# Patient Record
Sex: FEMALE | URBAN - METROPOLITAN AREA
[De-identification: names, ages, dates, MRNs, and addresses within clinical notes are randomized per-mention and may not be internally consistent; named-entity substitution may affect disease eponyms.]

---

## 2017-08-14 ENCOUNTER — INPATIENT (INPATIENT)
Facility: HOSPITAL | Age: 72
LOS: 1 days | Discharge: HOME | DRG: 194 | End: 2017-08-16
Attending: FAMILY MEDICINE | Admitting: INTERNAL MEDICINE
Payer: MEDICARE

## 2017-08-14 VITALS
SYSTOLIC BLOOD PRESSURE: 121 MMHG | HEIGHT: 67 IN | TEMPERATURE: 97 F | OXYGEN SATURATION: 94 % | HEART RATE: 67 BPM | RESPIRATION RATE: 15 BRPM | WEIGHT: 160.06 LBS | DIASTOLIC BLOOD PRESSURE: 55 MMHG

## 2017-08-14 DIAGNOSIS — R53.1 WEAKNESS: ICD-10-CM

## 2017-08-14 DIAGNOSIS — F32.9 MAJOR DEPRESSIVE DISORDER, SINGLE EPISODE, UNSPECIFIED: ICD-10-CM

## 2017-08-14 DIAGNOSIS — I10 ESSENTIAL (PRIMARY) HYPERTENSION: ICD-10-CM

## 2017-08-14 DIAGNOSIS — C22.9 MALIGNANT NEOPLASM OF LIVER, NOT SPECIFIED AS PRIMARY OR SECONDARY: ICD-10-CM

## 2017-08-14 DIAGNOSIS — J18.1 LOBAR PNEUMONIA, UNSPECIFIED ORGANISM: ICD-10-CM

## 2017-08-14 DIAGNOSIS — Z90.49 ACQUIRED ABSENCE OF OTHER SPECIFIED PARTS OF DIGESTIVE TRACT: Chronic | ICD-10-CM

## 2017-08-14 DIAGNOSIS — T83.511D INFECTION AND INFLAMMATORY REACTION DUE TO INDWELLING URETHRAL CATHETER, SUBSEQUENT ENCOUNTER: ICD-10-CM

## 2017-08-14 LAB
ALBUMIN SERPL ELPH-MCNC: 2.6 G/DL — LOW (ref 3.3–5)
ALP SERPL-CCNC: 296 U/L — HIGH (ref 40–120)
ALT FLD-CCNC: 21 U/L — SIGNIFICANT CHANGE UP (ref 12–78)
AMYLASE P1 CFR SERPL: 27 U/L — SIGNIFICANT CHANGE UP (ref 25–115)
ANION GAP SERPL CALC-SCNC: 7 MMOL/L — SIGNIFICANT CHANGE UP (ref 5–17)
APPEARANCE UR: ABNORMAL
APTT BLD: 37.2 SEC — SIGNIFICANT CHANGE UP (ref 27.5–37.4)
AST SERPL-CCNC: 79 U/L — HIGH (ref 15–37)
BACTERIA # UR AUTO: ABNORMAL
BASOPHILS # BLD AUTO: 0.1 K/UL — SIGNIFICANT CHANGE UP (ref 0–0.2)
BASOPHILS NFR BLD AUTO: 0.9 % — SIGNIFICANT CHANGE UP (ref 0–2)
BILIRUB SERPL-MCNC: 0.5 MG/DL — SIGNIFICANT CHANGE UP (ref 0.2–1.2)
BILIRUB UR-MCNC: ABNORMAL
BUN SERPL-MCNC: 18 MG/DL — SIGNIFICANT CHANGE UP (ref 7–23)
CALCIUM SERPL-MCNC: 8.3 MG/DL — LOW (ref 8.5–10.1)
CHLORIDE SERPL-SCNC: 105 MMOL/L — SIGNIFICANT CHANGE UP (ref 96–108)
CHOLEST SERPL-MCNC: 136 MG/DL — SIGNIFICANT CHANGE UP (ref 10–199)
CK MB BLD-MCNC: 0.2 % — SIGNIFICANT CHANGE UP (ref 0–3.5)
CK MB CFR SERPL CALC: 0.5 NG/ML — SIGNIFICANT CHANGE UP (ref 0–3.6)
CK SERPL-CCNC: 202 U/L — HIGH (ref 26–192)
CO2 SERPL-SCNC: 27 MMOL/L — SIGNIFICANT CHANGE UP (ref 22–31)
COLOR SPEC: YELLOW — SIGNIFICANT CHANGE UP
CREAT SERPL-MCNC: 0.85 MG/DL — SIGNIFICANT CHANGE UP (ref 0.5–1.3)
DIFF PNL FLD: ABNORMAL
EOSINOPHIL # BLD AUTO: 0.1 K/UL — SIGNIFICANT CHANGE UP (ref 0–0.5)
EOSINOPHIL NFR BLD AUTO: 0.5 % — SIGNIFICANT CHANGE UP (ref 0–6)
EPI CELLS # UR: ABNORMAL
GLUCOSE SERPL-MCNC: 92 MG/DL — SIGNIFICANT CHANGE UP (ref 70–99)
GLUCOSE UR QL: NEGATIVE — SIGNIFICANT CHANGE UP
HCT VFR BLD CALC: 31.7 % — LOW (ref 34.5–45)
HDLC SERPL-MCNC: 28 MG/DL — LOW (ref 40–125)
HGB BLD-MCNC: 10.6 G/DL — LOW (ref 11.5–15.5)
INR BLD: 1.28 RATIO — HIGH (ref 0.88–1.16)
KETONES UR-MCNC: ABNORMAL
LACTATE SERPL-SCNC: 1 MMOL/L — SIGNIFICANT CHANGE UP (ref 0.7–2)
LEUKOCYTE ESTERASE UR-ACNC: ABNORMAL
LIDOCAIN IGE QN: 99 U/L — SIGNIFICANT CHANGE UP (ref 73–393)
LIPID PNL WITH DIRECT LDL SERPL: 86 MG/DL — SIGNIFICANT CHANGE UP
LYMPHOCYTES # BLD AUTO: 1 K/UL — SIGNIFICANT CHANGE UP (ref 1–3.3)
LYMPHOCYTES # BLD AUTO: 9.7 % — LOW (ref 13–44)
MCHC RBC-ENTMCNC: 30.2 PG — SIGNIFICANT CHANGE UP (ref 27–34)
MCHC RBC-ENTMCNC: 33.3 GM/DL — SIGNIFICANT CHANGE UP (ref 32–36)
MCV RBC AUTO: 90.6 FL — SIGNIFICANT CHANGE UP (ref 80–100)
MONOCYTES # BLD AUTO: 0.8 K/UL — SIGNIFICANT CHANGE UP (ref 0–0.9)
MONOCYTES NFR BLD AUTO: 7.9 % — SIGNIFICANT CHANGE UP (ref 1–9)
NEUTROPHILS # BLD AUTO: 8.4 K/UL — HIGH (ref 1.8–7.4)
NEUTROPHILS NFR BLD AUTO: 81 % — HIGH (ref 43–77)
NITRITE UR-MCNC: POSITIVE
PH UR: 5 — SIGNIFICANT CHANGE UP (ref 5–8)
PLATELET # BLD AUTO: 188 K/UL — SIGNIFICANT CHANGE UP (ref 150–400)
POTASSIUM SERPL-MCNC: 4 MMOL/L — SIGNIFICANT CHANGE UP (ref 3.5–5.3)
POTASSIUM SERPL-SCNC: 4 MMOL/L — SIGNIFICANT CHANGE UP (ref 3.5–5.3)
PROT SERPL-MCNC: 6.9 G/DL — SIGNIFICANT CHANGE UP (ref 6–8.3)
PROT UR-MCNC: 75 MG/DL
PROTHROM AB SERPL-ACNC: 14 SEC — HIGH (ref 9.8–12.7)
RBC # BLD: 3.5 M/UL — LOW (ref 3.8–5.2)
RBC # FLD: 15.9 % — HIGH (ref 10.3–14.5)
RBC CASTS # UR COMP ASSIST: >50 /HPF (ref 0–4)
SODIUM SERPL-SCNC: 139 MMOL/L — SIGNIFICANT CHANGE UP (ref 135–145)
SP GR SPEC: 1.01 — SIGNIFICANT CHANGE UP (ref 1.01–1.02)
T3 SERPL-MCNC: 83 NG/DL — SIGNIFICANT CHANGE UP (ref 80–200)
T4 AB SER-ACNC: 6.6 UG/DL — SIGNIFICANT CHANGE UP (ref 4.6–12)
TOTAL CHOLESTEROL/HDL RATIO MEASUREMENT: 4.9 RATIO — SIGNIFICANT CHANGE UP (ref 3.3–7.1)
TRIGL SERPL-MCNC: 112 MG/DL — SIGNIFICANT CHANGE UP (ref 10–149)
TROPONIN I SERPL-MCNC: <.015 NG/ML — SIGNIFICANT CHANGE UP (ref 0.01–0.04)
UROBILINOGEN FLD QL: 4
WBC # BLD: 10.4 K/UL — SIGNIFICANT CHANGE UP (ref 3.8–10.5)
WBC # FLD AUTO: 10.4 K/UL — SIGNIFICANT CHANGE UP (ref 3.8–10.5)
WBC UR QL: >50

## 2017-08-14 PROCEDURE — 99285 EMERGENCY DEPT VISIT HI MDM: CPT

## 2017-08-14 PROCEDURE — 99223 1ST HOSP IP/OBS HIGH 75: CPT

## 2017-08-14 PROCEDURE — 93010 ELECTROCARDIOGRAM REPORT: CPT

## 2017-08-14 PROCEDURE — 71010: CPT | Mod: 26

## 2017-08-14 PROCEDURE — 70450 CT HEAD/BRAIN W/O DYE: CPT | Mod: 26

## 2017-08-14 RX ORDER — PANTOPRAZOLE SODIUM 20 MG/1
40 TABLET, DELAYED RELEASE ORAL DAILY
Qty: 0 | Refills: 0 | Status: DISCONTINUED | OUTPATIENT
Start: 2017-08-14 | End: 2017-08-16

## 2017-08-14 RX ORDER — LANOLIN ALCOHOL/MO/W.PET/CERES
3 CREAM (GRAM) TOPICAL AT BEDTIME
Qty: 0 | Refills: 0 | Status: COMPLETED | OUTPATIENT
Start: 2017-08-14 | End: 2017-08-14

## 2017-08-14 RX ORDER — ONDANSETRON 8 MG/1
4 TABLET, FILM COATED ORAL ONCE
Qty: 0 | Refills: 0 | Status: COMPLETED | OUTPATIENT
Start: 2017-08-14 | End: 2017-08-14

## 2017-08-14 RX ORDER — SERTRALINE 25 MG/1
100 TABLET, FILM COATED ORAL DAILY
Qty: 0 | Refills: 0 | Status: DISCONTINUED | OUTPATIENT
Start: 2017-08-14 | End: 2017-08-16

## 2017-08-14 RX ORDER — ENOXAPARIN SODIUM 100 MG/ML
40 INJECTION SUBCUTANEOUS DAILY
Qty: 0 | Refills: 0 | Status: DISCONTINUED | OUTPATIENT
Start: 2017-08-14 | End: 2017-08-16

## 2017-08-14 RX ORDER — SODIUM CHLORIDE 9 MG/ML
1000 INJECTION INTRAMUSCULAR; INTRAVENOUS; SUBCUTANEOUS
Qty: 0 | Refills: 0 | Status: COMPLETED | OUTPATIENT
Start: 2017-08-14 | End: 2017-08-14

## 2017-08-14 RX ORDER — SODIUM CHLORIDE 9 MG/ML
1000 INJECTION, SOLUTION INTRAVENOUS
Qty: 0 | Refills: 0 | Status: DISCONTINUED | OUTPATIENT
Start: 2017-08-14 | End: 2017-08-15

## 2017-08-14 RX ADMIN — SERTRALINE 100 MILLIGRAM(S): 25 TABLET, FILM COATED ORAL at 17:09

## 2017-08-14 RX ADMIN — SODIUM CHLORIDE 1000 MILLILITER(S): 9 INJECTION INTRAMUSCULAR; INTRAVENOUS; SUBCUTANEOUS at 15:15

## 2017-08-14 RX ADMIN — Medication 3 MILLIGRAM(S): at 22:31

## 2017-08-14 RX ADMIN — SODIUM CHLORIDE 1000 MILLILITER(S): 9 INJECTION INTRAMUSCULAR; INTRAVENOUS; SUBCUTANEOUS at 15:06

## 2017-08-14 RX ADMIN — ONDANSETRON 4 MILLIGRAM(S): 8 TABLET, FILM COATED ORAL at 17:08

## 2017-08-14 RX ADMIN — SODIUM CHLORIDE 100 MILLILITER(S): 9 INJECTION, SOLUTION INTRAVENOUS at 22:31

## 2017-08-14 RX ADMIN — SODIUM CHLORIDE 1000 MILLILITER(S): 9 INJECTION INTRAMUSCULAR; INTRAVENOUS; SUBCUTANEOUS at 15:50

## 2017-08-14 RX ADMIN — SODIUM CHLORIDE 100 MILLILITER(S): 9 INJECTION, SOLUTION INTRAVENOUS at 17:09

## 2017-08-14 NOTE — H&P ADULT - PROBLEM SELECTOR PLAN 1
left sided PNA, likely CAP  CXR: reviewed, will repeat AP/Latera  SIRS, hypotension  IV fluids   check procalcitonin  will start on Levaquin  BC pending

## 2017-08-14 NOTE — ED PROVIDER NOTE - PMH
Anxiety    Colon cancer    Depression    History of Oophorectomy    History of Osteoporosis    Hyperlipemia    Hypertension    Liver cancer

## 2017-08-14 NOTE — H&P ADULT - NSHPPHYSICALEXAM_GEN_ALL_CORE
PHYSICAL EXAM:  Vital Signs Last 24 Hrs  T(C): 36 (14 Aug 2017 14:27), Max: 36 (14 Aug 2017 14:27)  T(F): 96.8 (14 Aug 2017 14:27), Max: 96.8 (14 Aug 2017 14:27)  HR: 67 (14 Aug 2017 14:27) (67 - 67)  BP: 121/55 (14 Aug 2017 14:27) (121/55 - 121/55)  BP(mean): --  RR: 15 (14 Aug 2017 14:27) (15 - 15)  SpO2: 94% (14 Aug 2017 14:27) (94% - 94%)    GENERAL: NAD, well-groomed, well-developed  HEAD:  Atraumatic, Normocephalic  EYES: EOMI, PERRLA, conjunctiva and sclera clear  ENMT: No tonsillar erythema, exudates, or enlargement;   Moist mucous membranes dry Good dentition, No lesions  NECK: Supple, No JVD, Normal thyroid  NERVOUS SYSTEM:  , generalized weakness,    Motor Strength 5/5 B/L upper and lower extremities; CHEST/LUNG: mild rales on the left base no rhonchi, wheezing, or rubs  HEART: Regular rate and rhythm; No murmurs, rubs, or gallops  ABDOMEN: Soft, Nontender, Nondistended; Bowel sounds present  EXTREMITIES:  2+ Peripheral Pulses, No clubbing, cyanosis, or edema  LYMPH: No lymphadenopathy noted  SKIN: No rashes or lesions

## 2017-08-14 NOTE — ED PROVIDER NOTE - CARE PLAN
Principal Discharge DX:	Weakness  Secondary Diagnosis:	Pneumonia of left lower lobe due to infectious organism

## 2017-08-14 NOTE — H&P ADULT - ASSESSMENT
70 y/o f with pmh of colon ca stage IV with mets to the liver was on chemo, not responding to therapy, last chemo was a month ago, depression (stopped taking anti-depressant for few months) states for the past week, being admitted for SIRS likely secondary to left sided PNA with possible UTI,

## 2017-08-14 NOTE — ED PROVIDER NOTE - OBJECTIVE STATEMENT
70 yo F p/w gen weakness x past ~ 1 week. Pos dec appetite, dec po intake. Last chemo ~ 1 week ago. No n/v/d. NO neck/ back pain. No dyusuria / hematuria. No vomiting / diarrhea. no cp/sob/palp. no recent BOWEN. now co gen malaise. NO known fever. No agg/allev factors. No other inj or co. NO recent travel / sick contacts / trauma. 72 yo F p/w gen weakness x past ~ 1 week. Pos dec appetite, dec po intake. Last chemo ~ 1 month ago. No n/v/d. NO neck/ back pain. No dyusuria / hematuria. No vomiting / diarrhea. no cp/sob/palp. no recent BOWEN. now co gen malaise. NO known fever. No agg/allev factors. No other inj or co. NO recent travel / sick contacts / trauma.

## 2017-08-14 NOTE — H&P ADULT - NSHPLABSRESULTS_GEN_ALL_CORE
10.6   10.4  )-----------( 188      ( 14 Aug 2017 15:24 )             31.7           139  |  105  |  18  ----------------------------<  92  4.0   |  27  |  0.85    Ca    8.3<L>      14 Aug 2017 15:24    TPro  6.9  /  Alb  2.6<L>  /  TBili  0.5  /  DBili  x   /  AST  79<H>  /  ALT  21  /  AlkPhos  296<H>                Urinalysis Basic - ( 14 Aug 2017 16:07 )    Color: Yellow / Appearance: x / S.015 / pH: x  Gluc: x / Ketone: Trace  / Bili: Small / Urobili: 4   Blood: x / Protein: 75 mg/dL / Nitrite: Positive   Leuk Esterase: Moderate / RBC: >50 /HPF / WBC >50   Sq Epi: x / Non Sq Epi: Moderate / Bacteria: Many        PT/INR - ( 14 Aug 2017 15:24 )   PT: 14.0 sec;   INR: 1.28 ratio         PTT - ( 14 Aug 2017 15:24 )  PTT:37.2 sec    Lactate Trend   @ 15:24 Lactate:1.0       CARDIAC MARKERS ( 14 Aug 2017 15:24 )  <.015 ng/mL / x     / 202 U/L / x     / 0.5 ng/mL        CAPILLARY BLOOD GLUCOSE

## 2017-08-14 NOTE — ED PROVIDER NOTE - CHPI ED SYMPTOMS NEG
no chills/no vomiting/no fever/no headache/no shortness of breath/no decreased eating/drinking/no abdominal pain/no diarrhea/no rash/no cough

## 2017-08-14 NOTE — H&P ADULT - PROBLEM SELECTOR PLAN 3
secondary to 1 secondary to 1  also concerning is pt complainig not being able to get up for past week secondary to veritgo/dizziness  will obtain stat head ct

## 2017-08-14 NOTE — ED ADULT NURSE NOTE - PMH
Anxiety    Colon cancer    Depression    History of Oophorectomy    History of Osteoporosis    Hyperlipemia    Hypertension Anxiety    Colon cancer    Depression    History of Oophorectomy    History of Osteoporosis    Hyperlipemia    Hypertension    Liver cancer

## 2017-08-14 NOTE — H&P ADULT - PROBLEM SELECTOR PLAN 6
secondary to colon cancer, last chemo a month ago, not responding well to therapy?  has elevated alp/ast  avoid liver toxic meds  after managing acute illness to follow up with hemonc  as outpt

## 2017-08-14 NOTE — H&P ADULT - NSHPREVIEWOFSYSTEMS_GEN_ALL_CORE
REVIEW OF SYSTEMS:  CONSTITUTIONAL: fatigue   EYES: No eye pain, visual disturbances, or discharge  ENMT:  No difficulty hearing, tinnitus, vertigo; No sinus or throat pain  NECK: No pain or stiffness  BREASTS: No pain, masses, or nipple discharge  RESPIRATORY: dyspnic   CARDIOVASCULAR: No chest pain, palpitations, dizziness, or leg swelling  GASTROINTESTINAL: No abdominal or epigastric pain. No nausea, vomiting, or hematemesis; No diarrhea or constipation. No melena or hematochezia.  GENITOURINARY: No dysuria, frequency, hematuria, or incontinence  NEUROLOGICAL: generalzied weakness   SKIN: No itching, burning, rashes, or lesions   LYMPH NODES: No enlarged glands  ENDOCRINE: No heat or cold intolerance; No hair loss; No polydipsia or polyuria  MUSCULOSKELETAL: No joint pain or swelling; No muscle, back, or extremity pain  PSYCHIATRIC: No depression, anxiety, mood swings, or difficulty sleeping  HEME/LYMPH: No easy bruising, or bleeding gums  ALLERGY AND IMMUNOLOGIC: No hives or eczema

## 2017-08-14 NOTE — ED ADULT NURSE NOTE - OBJECTIVE STATEMENT
Pt received chemo 1 month ago for liver cancer has been feeling fatigue x 1 week  was sent by [facility]

## 2017-08-14 NOTE — H&P ADULT - PROBLEM SELECTOR PLAN 2
UA postive. with many epithelial cells, pt is asmptomatic,   will repeat UA/UC from straight cath, but meanwhile contue on levaquin for the PNA and will cover UTI?

## 2017-08-15 DIAGNOSIS — Z29.9 ENCOUNTER FOR PROPHYLACTIC MEASURES, UNSPECIFIED: ICD-10-CM

## 2017-08-15 DIAGNOSIS — R82.71 BACTERIURIA: ICD-10-CM

## 2017-08-15 LAB
ANION GAP SERPL CALC-SCNC: 9 MMOL/L — SIGNIFICANT CHANGE UP (ref 5–17)
BUN SERPL-MCNC: 9 MG/DL — SIGNIFICANT CHANGE UP (ref 7–23)
CALCIUM SERPL-MCNC: 8 MG/DL — LOW (ref 8.5–10.1)
CHLORIDE SERPL-SCNC: 107 MMOL/L — SIGNIFICANT CHANGE UP (ref 96–108)
CO2 SERPL-SCNC: 24 MMOL/L — SIGNIFICANT CHANGE UP (ref 22–31)
CREAT SERPL-MCNC: 0.76 MG/DL — SIGNIFICANT CHANGE UP (ref 0.5–1.3)
GLUCOSE SERPL-MCNC: 124 MG/DL — HIGH (ref 70–99)
HCT VFR BLD CALC: 31.8 % — LOW (ref 34.5–45)
HGB BLD-MCNC: 10.3 G/DL — LOW (ref 11.5–15.5)
MCHC RBC-ENTMCNC: 30.3 PG — SIGNIFICANT CHANGE UP (ref 27–34)
MCHC RBC-ENTMCNC: 32.4 GM/DL — SIGNIFICANT CHANGE UP (ref 32–36)
MCV RBC AUTO: 93.5 FL — SIGNIFICANT CHANGE UP (ref 80–100)
PLATELET # BLD AUTO: 155 K/UL — SIGNIFICANT CHANGE UP (ref 150–400)
POTASSIUM SERPL-MCNC: 4 MMOL/L — SIGNIFICANT CHANGE UP (ref 3.5–5.3)
POTASSIUM SERPL-SCNC: 4 MMOL/L — SIGNIFICANT CHANGE UP (ref 3.5–5.3)
RBC # BLD: 3.41 M/UL — LOW (ref 3.8–5.2)
RBC # FLD: 16.7 % — HIGH (ref 10.3–14.5)
SODIUM SERPL-SCNC: 140 MMOL/L — SIGNIFICANT CHANGE UP (ref 135–145)
WBC # BLD: 8.5 K/UL — SIGNIFICANT CHANGE UP (ref 3.8–10.5)
WBC # FLD AUTO: 8.5 K/UL — SIGNIFICANT CHANGE UP (ref 3.8–10.5)

## 2017-08-15 PROCEDURE — 71020: CPT | Mod: 26

## 2017-08-15 PROCEDURE — 99233 SBSQ HOSP IP/OBS HIGH 50: CPT | Mod: GC

## 2017-08-15 RX ORDER — LANOLIN ALCOHOL/MO/W.PET/CERES
3 CREAM (GRAM) TOPICAL ONCE
Qty: 0 | Refills: 0 | Status: COMPLETED | OUTPATIENT
Start: 2017-08-15 | End: 2017-08-15

## 2017-08-15 RX ORDER — SIMETHICONE 80 MG/1
80 TABLET, CHEWABLE ORAL ONCE
Qty: 0 | Refills: 0 | Status: COMPLETED | OUTPATIENT
Start: 2017-08-15 | End: 2017-08-15

## 2017-08-15 RX ORDER — ALPRAZOLAM 0.25 MG
0.5 TABLET ORAL ONCE
Qty: 0 | Refills: 0 | Status: DISCONTINUED | OUTPATIENT
Start: 2017-08-15 | End: 2017-08-15

## 2017-08-15 RX ORDER — ZALEPLON 10 MG
5 CAPSULE ORAL AT BEDTIME
Qty: 0 | Refills: 0 | Status: DISCONTINUED | OUTPATIENT
Start: 2017-08-15 | End: 2017-08-16

## 2017-08-15 RX ORDER — SODIUM CHLORIDE 9 MG/ML
1000 INJECTION, SOLUTION INTRAVENOUS
Qty: 0 | Refills: 0 | Status: DISCONTINUED | OUTPATIENT
Start: 2017-08-15 | End: 2017-08-16

## 2017-08-15 RX ORDER — ONDANSETRON 8 MG/1
4 TABLET, FILM COATED ORAL EVERY 4 HOURS
Qty: 0 | Refills: 0 | Status: DISCONTINUED | OUTPATIENT
Start: 2017-08-15 | End: 2017-08-16

## 2017-08-15 RX ADMIN — ENOXAPARIN SODIUM 40 MILLIGRAM(S): 100 INJECTION SUBCUTANEOUS at 13:25

## 2017-08-15 RX ADMIN — ONDANSETRON 4 MILLIGRAM(S): 8 TABLET, FILM COATED ORAL at 09:20

## 2017-08-15 RX ADMIN — PANTOPRAZOLE SODIUM 40 MILLIGRAM(S): 20 TABLET, DELAYED RELEASE ORAL at 13:23

## 2017-08-15 RX ADMIN — SODIUM CHLORIDE 100 MILLILITER(S): 9 INJECTION, SOLUTION INTRAVENOUS at 15:01

## 2017-08-15 RX ADMIN — SERTRALINE 100 MILLIGRAM(S): 25 TABLET, FILM COATED ORAL at 13:24

## 2017-08-15 RX ADMIN — ONDANSETRON 4 MILLIGRAM(S): 8 TABLET, FILM COATED ORAL at 15:37

## 2017-08-15 RX ADMIN — Medication 0.5 MILLIGRAM(S): at 17:40

## 2017-08-15 RX ADMIN — SIMETHICONE 80 MILLIGRAM(S): 80 TABLET, CHEWABLE ORAL at 21:30

## 2017-08-15 RX ADMIN — Medication 3 MILLIGRAM(S): at 21:30

## 2017-08-15 NOTE — PROGRESS NOTE ADULT - PROBLEM SELECTOR PLAN 1
-No leukocytosis, Procal 0.14, patient afebrile and asymptomatic.  -CXR: LLL PNA, likely CAP.  -Continue with Levaquin (2).  -IVF: D5 and 1/2 NS @ 100cc/hr.  -Follow up repeat AP/Lateral CXR, blood cultures. -No leukocytosis, Procal 0.14, patient afebrile and asymptomatic.  -CXR: possible LLL PNA, likely CAP.  -Continue with Levaquin (2).  -IVF: D5 and 1/2 NS @ 100cc/hr.  -Follow up repeat AP/Lateral CXR, blood cultures.

## 2017-08-15 NOTE — PROGRESS NOTE ADULT - ASSESSMENT
70 yo F with PMH of Colon Cancer Stage IV with metastasis to the liver, previously on chemotherapy (not responding to therapy, last chemo was a month ago), Depression (stopped taking anti-depressants for few months) admitted to [facility] for SIRS likely secondary to left sided PNA with possible UTI. 72 yo F with PMH of Colon Cancer Stage IV with metastasis to the liver, previously on chemotherapy (not responding to therapy, last chemo was a month ago), Depression (stopped taking anti-depressants for few months) admitted to [facility] for suspected pneumonia and UTI.

## 2017-08-15 NOTE — PROGRESS NOTE ADULT - PROBLEM SELECTOR PLAN 2
-No leukocytosis, Procal 0.14, patient afebrile and asymptomatic.  -UA on admission showed moderate LE, +nitrites, >50 WBCs, many bacteria.  -No need for antibiotics at this time, though patient currently on Levaquin (2) for PNA.  -Follow up urine culture.

## 2017-08-16 ENCOUNTER — TRANSCRIPTION ENCOUNTER (OUTPATIENT)
Age: 72
End: 2017-08-16

## 2017-08-16 VITALS
HEART RATE: 69 BPM | TEMPERATURE: 98 F | RESPIRATION RATE: 16 BRPM | OXYGEN SATURATION: 92 % | DIASTOLIC BLOOD PRESSURE: 76 MMHG | SYSTOLIC BLOOD PRESSURE: 135 MMHG

## 2017-08-16 LAB
-  AMIKACIN: SIGNIFICANT CHANGE UP
-  AMPICILLIN/SULBACTAM: SIGNIFICANT CHANGE UP
-  AMPICILLIN: SIGNIFICANT CHANGE UP
-  AZTREONAM: SIGNIFICANT CHANGE UP
-  CEFAZOLIN: SIGNIFICANT CHANGE UP
-  CEFEPIME: SIGNIFICANT CHANGE UP
-  CEFOXITIN: SIGNIFICANT CHANGE UP
-  CEFTAZIDIME: SIGNIFICANT CHANGE UP
-  CEFTRIAXONE: SIGNIFICANT CHANGE UP
-  CIPROFLOXACIN: SIGNIFICANT CHANGE UP
-  ERTAPENEM: SIGNIFICANT CHANGE UP
-  GENTAMICIN: SIGNIFICANT CHANGE UP
-  IMIPENEM: SIGNIFICANT CHANGE UP
-  LEVOFLOXACIN: SIGNIFICANT CHANGE UP
-  MEROPENEM: SIGNIFICANT CHANGE UP
-  NITROFURANTOIN: SIGNIFICANT CHANGE UP
-  PIPERACILLIN/TAZOBACTAM: SIGNIFICANT CHANGE UP
-  TOBRAMYCIN: SIGNIFICANT CHANGE UP
-  TRIMETHOPRIM/SULFAMETHOXAZOLE: SIGNIFICANT CHANGE UP
APPEARANCE UR: ABNORMAL
BILIRUB UR-MCNC: NEGATIVE — SIGNIFICANT CHANGE UP
COLOR SPEC: YELLOW — SIGNIFICANT CHANGE UP
CULTURE RESULTS: SIGNIFICANT CHANGE UP
DIFF PNL FLD: ABNORMAL
GLUCOSE UR QL: NEGATIVE — SIGNIFICANT CHANGE UP
KETONES UR-MCNC: NEGATIVE — SIGNIFICANT CHANGE UP
LEUKOCYTE ESTERASE UR-ACNC: ABNORMAL
METHOD TYPE: SIGNIFICANT CHANGE UP
NITRITE UR-MCNC: NEGATIVE — SIGNIFICANT CHANGE UP
ORGANISM # SPEC MICROSCOPIC CNT: SIGNIFICANT CHANGE UP
ORGANISM # SPEC MICROSCOPIC CNT: SIGNIFICANT CHANGE UP
PH UR: 6 — SIGNIFICANT CHANGE UP (ref 5–8)
PROT UR-MCNC: NEGATIVE — SIGNIFICANT CHANGE UP
SP GR SPEC: 1 — LOW (ref 1.01–1.02)
SPECIMEN SOURCE: SIGNIFICANT CHANGE UP
UROBILINOGEN FLD QL: NEGATIVE — SIGNIFICANT CHANGE UP

## 2017-08-16 PROCEDURE — 99239 HOSP IP/OBS DSCHRG MGMT >30: CPT

## 2017-08-16 RX ORDER — ALPRAZOLAM 0.25 MG
0.5 TABLET ORAL ONCE
Qty: 0 | Refills: 0 | Status: DISCONTINUED | OUTPATIENT
Start: 2017-08-16 | End: 2017-08-16

## 2017-08-16 RX ORDER — ALPRAZOLAM 0.25 MG
1 TABLET ORAL
Qty: 10 | Refills: 0 | OUTPATIENT
Start: 2017-08-16 | End: 2017-08-21

## 2017-08-16 RX ORDER — CIPROFLOXACIN LACTATE 400MG/40ML
1 VIAL (ML) INTRAVENOUS
Qty: 2 | Refills: 0 | OUTPATIENT
Start: 2017-08-16 | End: 2017-08-18

## 2017-08-16 RX ORDER — ALPRAZOLAM 0.25 MG
0.25 TABLET ORAL ONCE
Qty: 0 | Refills: 0 | Status: DISCONTINUED | OUTPATIENT
Start: 2017-08-16 | End: 2017-08-16

## 2017-08-16 RX ADMIN — Medication 0.5 MILLIGRAM(S): at 00:27

## 2017-08-16 RX ADMIN — Medication 0.25 MILLIGRAM(S): at 15:04

## 2017-08-16 RX ADMIN — ONDANSETRON 4 MILLIGRAM(S): 8 TABLET, FILM COATED ORAL at 12:43

## 2017-08-16 RX ADMIN — SERTRALINE 100 MILLIGRAM(S): 25 TABLET, FILM COATED ORAL at 12:05

## 2017-08-16 RX ADMIN — ENOXAPARIN SODIUM 40 MILLIGRAM(S): 100 INJECTION SUBCUTANEOUS at 12:05

## 2017-08-16 NOTE — PROGRESS NOTE ADULT - ASSESSMENT
72 yo F with PMH of Colon Cancer Stage IV with metastasis to the liver, previously on chemotherapy (not responding to therapy, last chemo was a month ago), Depression (stopped taking anti-depressants for few months) admitted to [facility] for suspected pneumonia and UTI.

## 2017-08-16 NOTE — DISCHARGE NOTE ADULT - MEDICATION SUMMARY - MEDICATIONS TO TAKE
I will START or STAY ON the medications listed below when I get home from the hospital:    Zoloft 100 mg oral tablet  -- 1 tab(s) by mouth once a day  -- Indication: For Depression    ALPRAZolam 0.5 mg oral tablet  -- 1 tab(s) by mouth 2 times a day, PRN for anxiety MDD:2  -- Avoid grapefruit and grapefruit juice while taking this medication.  Caution federal law prohibits the transfer of this drug to any person other  than the person for whom it was prescribed.  Do not take this drug if you are pregnant.  May cause drowsiness.  Alcohol may intensify this effect.  Use care when operating dangerous machinery.    -- Indication: For PRN for Anxiety     Levaquin 500 mg oral tablet  -- 1 tab(s) by mouth once a day for UTI  -- Avoid prolonged or excessive exposure to direct and/or artificial sunlight while taking this medication.  Do not take dairy products, antacids, or iron preparations within one hour of this medication.  Finish all this medication unless otherwise directed by prescriber.  May cause drowsiness or dizziness.  Medication should be taken with plenty of water.    -- Indication: For For UTI

## 2017-08-16 NOTE — PROGRESS NOTE ADULT - PROBLEM SELECTOR PLAN 6
-Chronic, stable.  -Per PMD, was titrated off home meds.  -Will continue to monitor BP for now.
-Chronic, stable.  -Per PMD, was titrated off home meds.  -Will continue to monitor BP for now.

## 2017-08-16 NOTE — PROGRESS NOTE ADULT - PROBLEM SELECTOR PLAN 3
-Secondary to PNA vs UTI.  -CT Head negative on admission.  -Improved today, will continue to hydrate and monitor for now.  - PT eval- will FU
-Secondary to PNA vs UTI.  -CT Head negative on admission.  -Improved today, will continue to hydrate and monitor for now.

## 2017-08-16 NOTE — DISCHARGE NOTE ADULT - HOSPITAL COURSE
72 y/o f with pmh of colon ca stage IV with mets to the liver was on chemo, not responding to therapy, last chemo was a month ago, depression (stopped taking anti-depressant for few months) states for the past week, was having generalized weakness, bedconfined, not able to ambulate as felt dizzy and light headiness, pt went to PCP today for check up and was found to be hypotensive 80/60 and was sent to the ER, on further workup pt admits not having appetite and eating much for the past week, pt found to have left sided PNA with UTI on UA, pt denies any cp, palpitaiton, abdominal changes  spoke to PCP Joe abbott pt is only on zoloft 100 mg, has history of htn but was weaned of the meds     Patient was admitted to [facility] for dehydration/asymptomatic bacturia/weakness.  Patient remained afebrile and WBC was wnl throughout stay.  Patient was started on PO Levaquin to cover for possible UTI vs. PNA. Blood Cx's have been NGTD, Urine Cx's prelim are growing Klebsiella.  Patient also had episodes of being anxious.  Patient was discharged with PO Levaquin abx and Xanax sent to pharmacy. Patient was seen and examined on day of discharge and determined to be medically cleared for DC. 70 y/o f with pmh of colon ca stage IV with mets to the liver was on chemo, not responding to therapy, last chemo was a month ago, depression (stopped taking anti-depressant for few months) states for the past week, was having generalized weakness, bedconfined, not able to ambulate as felt dizzy and light headiness, pt went to PCP today for check up and was found to be hypotensive 80/60 and was sent to the ER, on further workup pt admits not having appetite and eating much for the past week, pt found to have left sided PNA with UTI on UA, pt denies any cp, palpitaiton, abdominal changes  spoke to PCP Joe abbott pt is only on zoloft 100 mg, has history of htn but was weaned of the meds.    Patient had a CXR to evaluate for possible PNA:  Right chest port reidentified in situ. No change heart mediastinum.   Suspect scattered small bilateral nodular parenchymal opacities   inflammatory/infectious versus neoplastic. Correlate with chest CT. No   significant pleural abnormality.    Patient was admitted to [facility] for dehydration/asymptomatic bacturia/weakness.  Patient remained afebrile and WBC was wnl throughout stay.  Patient was started on PO Levaquin to cover for possible UTI vs. PNA. Blood Cx's have been NGTD, Urine Cx's prelim are growing Klebsiella.  Patient also had episodes of being anxious.  Patient was discharged with PO Levaquin abx and Xanax sent to pharmacy. Patient was seen and examined on day of discharge and determined to be medically cleared for DC. 72 y/o f with pmh of colon ca stage IV with mets to the liver was on chemo, not responding to therapy, last chemo was a month ago, depression (stopped taking anti-depressant for few months) states for the past week, was having generalized weakness, bedconfined, not able to ambulate as felt dizzy and light headiness, pt went to PCP today for check up and was found to be hypotensive 80/60 and was sent to the ER, on further workup pt admits not having appetite and eating much for the past week, pt found to have left sided PNA with UTI on UA, pt denies any cp, palpitaiton, abdominal changes  spoke to PCP Joe abbott pt is only on zoloft 100 mg, has history of htn but was weaned of the meds.    Patient had a CXR to evaluate for possible PNA:  Right chest port reidentified in situ. No change heart mediastinum.   Suspect scattered small bilateral nodular parenchymal opacities   inflammatory/infectious versus neoplastic. Correlate with chest CT. No   significant pleural abnormality. Patient provided with disc containing image for follow up with PCP.     Patient was admitted to [facility] for dehydration/asymptomatic bacturia/weakness and possible pneumonia.  Patient remained afebrile and WBC was wnl throughout stay.  Patient was started on PO Levaquin to cover for possible UTI vs. PNA. Blood Cx's have been NGTD, Urine Cx's prelim are growing Klebsiella.  Patient also had episodes of being anxious.  Patient was discharged with PO Levaquin abx and Xanax sent to pharmacy. Patient was seen and examined on day of discharge and determined to be medically cleared for DC.    Dr. Abbott, PCP notified. Will fax discharge to him.

## 2017-08-16 NOTE — DISCHARGE NOTE ADULT - SECONDARY DIAGNOSIS.
Anxiety Depression Malignant neoplasm of liver, unspecified liver malignancy type Pneumonia of left lower lobe due to infectious organism

## 2017-08-16 NOTE — PROGRESS NOTE ADULT - ATTENDING COMMENTS
Patient's symptoms improved. Will discharge home and she will complete a course of antibiotics and follow up with PCP and oncology.
Symptoms slightly improved after IV fluids. Continue hydration. Patient likely dehydrated from poor po intake in the setting of depression/failure to thrive. Continue to monitor.

## 2017-08-16 NOTE — DISCHARGE NOTE ADULT - ADDITIONAL INSTRUCTIONS
-Please follow up with your PMD within one week.  -Please follow up with outpatient (information below).  -Continue taking your medications as directed above.  -If symptoms persist/worsen, please call your PMD or return to the ED.

## 2017-08-16 NOTE — PHYSICAL THERAPY INITIAL EVALUATION ADULT - DISCHARGE DISPOSITION, PT EVAL
home/no skilled PT needs/Pt is functionally independent and requires no skilled physical therapy needs. Will discharge from physical therapy secondary to patient independent

## 2017-08-16 NOTE — DISCHARGE NOTE ADULT - CARE PLAN
Principal Discharge DX:	Asymptomatic bacteriuria  Goal:	return to ADL's  Instructions for follow-up, activity and diet:	No leukocytosis, Procal 0.14, patient afebrile and asymptomatic.  -UA on admission showed moderate LE, +nitrites, >50 WBCs, many bacteria.  -UCx prelim: Klebsiella >100K  - will D/C on proper course of PO levaquin.  Secondary Diagnosis:	Anxiety  Instructions for follow-up, activity and diet:	- patient anxious throughout hospital stay requiring Xanax  - will send prescription for home  - FU with PCP  Secondary Diagnosis:	Depression  Instructions for follow-up, activity and diet:	Chronic, non-compliant with Zoloft prior to admission.  -Continue with Zoloft 100mg PO qd.   - FU with PCP to restart home meds if willing  Secondary Diagnosis:	Malignant neoplasm of liver, unspecified liver malignancy type  Instructions for follow-up, activity and diet:	-Secondary to Colon Cancer Stage IV, with last chemotherapy session approximately one month ago, not responding well to therapy.  -Elevated Alk Phos (296) and AST (79) on admission.  -Will avoid hepatotoxic meds.  -Patient to follow up with his Heme/Onc Dr. Molina as outpatient.  - CXR possible mets to Lung, will contact PCP. Principal Discharge DX:	Asymptomatic bacteriuria  Goal:	return to ADL's  Instructions for follow-up, activity and diet:	No leukocytosis, Procal 0.14, patient afebrile and asymptomatic.  -UA on admission showed moderate LE, +nitrites, >50 WBCs, many bacteria.  -UCx prelim: Klebsiella >100K  - complete course of Levaquin  Secondary Diagnosis:	Anxiety  Instructions for follow-up, activity and diet:	- patient anxious throughout hospital stay requiring Xanax  - will send prescription for home.  - FU with PCP  Secondary Diagnosis:	Depression  Instructions for follow-up, activity and diet:	Chronic, non-compliant with Zoloft prior to admission.  -Continue with Zoloft 100mg PO qd.   - FU with PCP to restart home meds if willing  Secondary Diagnosis:	Malignant neoplasm of liver, unspecified liver malignancy type  Instructions for follow-up, activity and diet:	-Secondary to Colon Cancer Stage IV, with last chemotherapy session approximately one month ago, not responding well to therapy.  -Elevated Alk Phos (296) and AST (79) on admission.  -Will avoid hepatotoxic meds.  -Patient to follow up with his Heme/Onc Dr. Molina as outpatient.  - CXR possible mets to Lung, will contact PCP.  Secondary Diagnosis:	Pneumonia of left lower lobe due to infectious organism  Instructions for follow-up, activity and diet:	Complete course of levaquin.   It is unclear if you have pneumonia or not but considering the nodules on your chest xray it is a possiblility. Complete course of antibiotics and follow up with your primary care doctor.   You have been provided with a disc of the imaging for follow up because of nodules seen on the xray.

## 2017-08-16 NOTE — CHART NOTE - NSCHARTNOTEFT_GEN_A_CORE
70 y/o f with pmh of colon ca stage IV with mets to the liver admitted for SIRS likely secondary to left sided PNA with possible UTI,   Called by RN for abdominal pain.   Pt. examined at bedside. She is resting comfortably. She describes a "gassy" type of pain diffusely throughout her abdomen. She reports that this pain occurs when she is home, and she usually take a Gas-X which relieves her symptoms. Her last BM was on Friday because she has been eating very little. She denies any chest pain, SOB, nausea, vomiting, or fevers at this time. She has no other complaints.     Vital Signs Last 24 Hrs  T(C): 36.8 (15 Aug 2017 20:33), Max: 36.9 (15 Aug 2017 09:37)  T(F): 98.3 (15 Aug 2017 20:33), Max: 98.4 (15 Aug 2017 09:37)  HR: 61 (15 Aug 2017 20:33) (61 - 69)  BP: 110/60 (15 Aug 2017 20:33) (110/60 - 133/73)  BP(mean): --  RR: 18 (15 Aug 2017 20:33) (18 - 18)  SpO2: 98% (15 Aug 2017 20:33) (93% - 100%)    PE:    General: AAOx3, NAD  Cardio: RRR, Normal S1 and S2, no murmurs  Lungs: CTA b/l  Abdomen: soft, nondistended, nontender, bowel sounds present throughout  Ext: no peripheral edema    A/P: 72yo F with PMHx of colon cancer and depression admitted for SIRS. Currently having abdominal pain identical to the pain she gets at home which relieves with gas-x. No signs of acute abdomen at this time.  - simethicone 80mg ordered  - follow-up as needed

## 2017-08-16 NOTE — PROGRESS NOTE ADULT - PROBLEM SELECTOR PROBLEM 4
Malignant neoplasm of liver, unspecified liver malignancy type
Malignant neoplasm of liver, unspecified liver malignancy type

## 2017-08-16 NOTE — PROGRESS NOTE ADULT - PROBLEM SELECTOR PROBLEM 1
Pneumonia of left lower lobe due to infectious organism
Pneumonia of left lower lobe due to infectious organism

## 2017-08-16 NOTE — PROGRESS NOTE ADULT - SUBJECTIVE AND OBJECTIVE BOX
Resident Progress Note    Patient is a 71y old  Female who presents with a chief complaint of weakness, dizzy (14 Aug 2017 20:34)      HPI: Patient seen and examined at bedside. No acute events overnight. Reports feeling much better today, weakness and fatigue have improved. Also reported nausea this AM and difficulty sleeping last night. Otherwise, patient is stable without further complaints..    ROS:  Constitutional: reports fatigue, weakness, denies fever, chills, diaphoresis   HEENT: denies blurry vision, difficulty hearing  Respiratory: denies SOB, BOWEN, cough, sputum production, wheezing, hemoptysis  Cardiovascular: denies CP, palpitations, edema  Gastrointestinal: reports nausea, denies, vomiting, diarrhea, constipation, abdominal pain, melena, hematochezia   Genitourinary: denies dysuria, frequency, urgency, hematuria   Skin/Breast: denies rash, itching  Musculoskeletal: denies myalgias, joint swelling, muscle weakness  Neurologic: denies headache, weakness, dizziness, paresthesias, numbness/tingling  Psychiatric: denies feeling anxious, depressed, suicidal, homicidal thoughts  Hematology/Oncology: denies bruising, tender or enlarged lymph nodes     Vital Signs Last 24 Hrs  T(C): 36.9 (08-15-17 @ 09:37), Max: 36.9 (08-15-17 @ 09:37)  T(F): 98.4 (08-15-17 @ 09:37), Max: 98.4 (08-15-17 @ 09:37)  HR: 69 (08-15-17 @ 09:37) (66 - 71)  BP: 130/75 (08-15-17 @ 09:37) (114/69 - 133/73)  RR: 18 (08-15-17 @ 09:37) (15 - 18)  SpO2: 98% (08-15-17 @ 09:37) (94% - 100%)    Physical Exam:  General: Well developed, NAD  HEENT: NCAT, PERRLA, EOMI bl, moist mucous membranes   Neck: Supple, nontender, no mass  Neurology: A&Ox3, nonfocal, CN II-XII grossly intact, sensation intact   Respiratory: CTA B/L, No wheezing  CV: RRR, +S1/S2, no murmurs, rubs or gallops  Abdominal: Soft, NT, ND +BSx4  Extremities: No LE edema bilaterally, + peripheral pulses  MSK: Normal ROM, no joint erythema or warmth, no joint swelling   Skin: warm, dry, normal color, no rash or abnormal lesions    LABS:                        10.3   8.5   )-----------( 155      ( 15 Aug 2017 06:28 )             31.8     15 Aug 2017 06:28    140    |  107    |  9      ----------------------------<  124    4.0     |  24     |  0.76     Ca    8.0        15 Aug 2017 06:28    TPro  6.9    /  Alb  2.6    /  TBili  0.5    /  DBili  x      /  AST  79     /  ALT  21     /  AlkPhos  296    14 Aug 2017 15:24    PT/INR - ( 14 Aug 2017 15:24 )   PT: 14.0 sec;   INR: 1.28 ratio         PTT - ( 14 Aug 2017 15:24 )  PTT:37.2 sec  CARDIAC MARKERS ( 14 Aug 2017 15:24 )  <.015 ng/mL / x     / 202 U/L / x     / 0.5 ng/mL      Urinalysis Basic - ( 14 Aug 2017 16:07 )    Color: Yellow / Appearance: x / S.015 / pH: x  Gluc: x / Ketone: Trace  / Bili: Small / Urobili: 4   Blood: x / Protein: 75 mg/dL / Nitrite: Positive   Leuk Esterase: Moderate / RBC: >50 /HPF / WBC >50   Sq Epi: x / Non Sq Epi: Moderate / Bacteria: Many      RADIOLOGY & ADDITIONAL TESTS:    EXAM:  CT BRAIN                        PROCEDURE DATE:  2017   IMPRESSION:  No acute intracranial hemorrhage or acute territorial infarct.     EXAM:  CHEST 1 VIEW                        PROCEDURE DATE:  2017   Impression:  Right-sided MediPort catheter is present, tip at the level of the   superior vena cava. The evaluation of the cardiomediastinal silhouette is limited on portable   technique. There are prominent bronchovascular markings at both lung bases.  There is a possible nodular opacity peripherally left lower lung zone.  Recommend further evaluation with PA and lateral view.      MEDICATIONS  (STANDING):  sertraline 100 milliGRAM(s) Oral daily  pantoprazole   Suspension 40 milliGRAM(s) Oral daily  enoxaparin Injectable 40 milliGRAM(s) SubCutaneous daily  levoFLOXacin  Tablet 500 milliGRAM(s) Oral every 24 hours  dextrose 5% + sodium chloride 0.45%. 1000 milliLiter(s) (100 mL/Hr) IV Continuous <Continuous>    MEDICATIONS  (PRN):  ondansetron Injectable 4 milliGRAM(s) IV Push every 4 hours PRN Nausea and/or Vomiting      Allergies    penicillin (Other)    Intolerances
Patient is a 71y old  Female who presents with a chief complaint of weakness, dizzy (14 Aug 2017 20:34)      INTERVAL HPI/OVERNIGHT EVENTS:    MEDICATIONS  (STANDING):  sertraline 100 milliGRAM(s) Oral daily  enoxaparin Injectable 40 milliGRAM(s) SubCutaneous daily  levoFLOXacin  Tablet 500 milliGRAM(s) Oral every 24 hours  dextrose 5% + sodium chloride 0.45%. 1000 milliLiter(s) (100 mL/Hr) IV Continuous <Continuous>    MEDICATIONS  (PRN):  ondansetron Injectable 4 milliGRAM(s) IV Push every 4 hours PRN Nausea and/or Vomiting  zaleplon 5 milliGRAM(s) Oral at bedtime PRN Insomnia      Allergies    penicillin (Other)    Intolerances        REVIEW OF SYSTEMS:  CONSTITUTIONAL: No fever or chills  HEENT:  No headache, no sore throat  RESPIRATORY: No cough, wheezing, or shortness of breath  CARDIOVASCULAR: No chest pain, palpitations, or leg swelling  GASTROINTESTINAL: No nausea, vomiting, or diarrhea  GENITOURINARY: No dysuria, frequency, or hematuria  NEUROLOGICAL: no focal weakness or dizziness  SKIN:  No rashes or lesions   MUSCULOSKELETAL: no myalgias   PSYCHIATRIC: No depression or anxiety  ROS Unable to obtain due to - [ ] Dementia  [ ] Lethargy  REST OF REVIEW Of SYSTEM - [ ] Normal     Vital Signs Last 24 Hrs  T(C): 36.7 (16 Aug 2017 04:52), Max: 36.8 (15 Aug 2017 20:33)  T(F): 98 (16 Aug 2017 04:52), Max: 98.3 (15 Aug 2017 20:33)  HR: 67 (16 Aug 2017 04:52) (61 - 67)  BP: 131/64 (16 Aug 2017 04:52) (110/60 - 131/64)  BP(mean): --  RR: 18 (16 Aug 2017 04:52) (18 - 18)  SpO2: 97% (16 Aug 2017 04:52) (93% - 98%)    PHYSICAL EXAM:  GENERAL: No Acute Distress  HEENT:  EOMI, mucous membranes moist  CHEST/LUNG:  Clear To Auscultation b/l, no rales, wheezes, or rhonchi  HEART:  Regular Rate Rhythm, S1, S2  ABDOMEN:  Bowel Sounds+, soft, Non-Tender, Non-Distended  EXTREMITIES: no edema or calf tenderness  SKIN:  no signs of erythema/drainage around port site  NERVOUS SYSTEM: AA&Ox3        LABS:    CBC Full  -  ( 15 Aug 2017 06:28 )  WBC Count : 8.5 K/uL  Hemoglobin : 10.3 g/dL  Hematocrit : 31.8 %  Platelet Count - Automated : 155 K/uL  Mean Cell Volume : 93.5 fl  Mean Cell Hemoglobin : 30.3 pg  Mean Cell Hemoglobin Concentration : 32.4 gm/dL        Ca    8.0        15 Aug 2017 06:28      PT/INR - ( 14 Aug 2017 15:24 )   PT: 14.0 sec;   INR: 1.28 ratio         PTT - ( 14 Aug 2017 15:24 )  PTT:37.2 sec  Urinalysis Basic - ( 14 Aug 2017 16:07 )    Color: Yellow / Appearance: x / S.015 / pH: x  Gluc: x / Ketone: Trace  / Bili: Small / Urobili: 4   Blood: x / Protein: 75 mg/dL / Nitrite: Positive   Leuk Esterase: Moderate / RBC: >50 /HPF / WBC >50   Sq Epi: x / Non Sq Epi: Moderate / Bacteria: Many              RECENT CULTURES:    Urine Cx: >100K Klebsiella  Blood Cx: NGTD          CK:  202    CPK Mass Assay:  0.2    troponin i:  <.015      T3: 83  T4: 6.6        TSH: 5.11            RADIOLOGY & ADDITIONAL TESTS:    CXR:   Right chest port reidentified in situ. No change heart mediastinum.   Suspect scattered small bilateral nodular parenchymal opacities   inflammatory/infectious versus neoplastic. Correlate with chest CT. No   significant pleural abnormality.

## 2017-08-16 NOTE — PROGRESS NOTE ADULT - PROBLEM SELECTOR PLAN 1
-No leukocytosis, Procal 0.14, patient afebrile and asymptomatic.  -CXR: possible LLL PNA vs Mets  -Continue with Levaquin (2).  -IVF: D5 and 1/2 NS @ 100cc/hr- will D/C to prep for home  BCx: NGTD  - PNA still on differential due to patient on chemo and could not be mounting proper immune response on labs  - will send out on PO Levaquin for UTI, FU with PCP -No leukocytosis, Procal 0.14, patient afebrile and asymptomatic.  -CXR: possible LLL PNA vs Mets  -Continue with Levaquin (2).  BCx: NGTD  - PNA still on differential due to patient on chemo and could not be mounting proper immune response on labs  - will send out on PO Levaquin for possible pnemonia, UTI, FU with PCP

## 2017-08-16 NOTE — DISCHARGE NOTE ADULT - PATIENT PORTAL LINK FT
“You can access the [facility] Patient Portal, offered by [facility], by registering with the following website: [email or web address]”

## 2017-08-16 NOTE — PROGRESS NOTE ADULT - PROBLEM SELECTOR PLAN 2
-No leukocytosis, Procal 0.14, patient afebrile and asymptomatic.  -UA on admission showed moderate LE, +nitrites, >50 WBCs, many bacteria.  -UCx prelim: Klebsiella >100K  - will D/C on proper course of PO levaquin -No leukocytosis, Procal 0.14, patient afebrile and asymptomatic.  -UA on admission showed moderate LE, +nitrites, >50 WBCs, many bacteria.  -UCx prelim: Klebsiella >100K  -Follow up repeat UA prior to DC.  - will D/C on proper course of PO levaquin

## 2017-08-16 NOTE — PROGRESS NOTE ADULT - PROBLEM SELECTOR PLAN 4
-Secondary to Colon Cancer Stage IV, with last chemotherapy session approximately one month ago, not responding well to therapy.  -Elevated Alk Phos (296) and AST (79) on admission.  -Will avoid hepatotoxic meds.  -Patient to follow up with his Heme/Onc Dr. Molina as outpatient.  - CXR possible mets to Lung, will contact PCP
-Secondary to Colon Cancer Stage IV, with last chemotherapy session approximately one month ago, not responding well to therapy.  -Elevated Alk Phos (296) and AST (79) on admission.  -Will avoid hepatotoxic meds.  -Patient to follow up with his Heme/Onc Dr. Molina as outpatient.

## 2017-08-16 NOTE — DISCHARGE NOTE ADULT - PLAN OF CARE
return to ADL's No leukocytosis, Procal 0.14, patient afebrile and asymptomatic.  -UA on admission showed moderate LE, +nitrites, >50 WBCs, many bacteria.  -UCx prelim: Klebsiella >100K  - will D/C on proper course of PO levaquin. -Secondary to Colon Cancer Stage IV, with last chemotherapy session approximately one month ago, not responding well to therapy.  -Elevated Alk Phos (296) and AST (79) on admission.  -Will avoid hepatotoxic meds.  -Patient to follow up with his Heme/Onc Dr. Molina as outpatient.  - CXR possible mets to Lung, will contact PCP. Chronic, non-compliant with Zoloft prior to admission.  -Continue with Zoloft 100mg PO qd.   - FU with PCP to restart home meds if willing - patient anxious throughout hospital stay requiring Xanax  - will send prescription for home  - FU with PCP Complete course of levaquin.   It is unclear if you have pneumonia or not but considering the nodules on your chest xray it is a possiblility. Complete course of antibiotics and follow up with your primary care doctor.   You have been provided with a disc of the imaging for follow up because of nodules seen on the xray. No leukocytosis, Procal 0.14, patient afebrile and asymptomatic.  -UA on admission showed moderate LE, +nitrites, >50 WBCs, many bacteria.  -UCx prelim: Klebsiella >100K  - complete course of Levaquin - patient anxious throughout hospital stay requiring Xanax  - will send prescription for home.  - FU with PCP

## 2017-08-16 NOTE — PROGRESS NOTE ADULT - PROBLEM SELECTOR PLAN 5
-Chronic, non-compliant with Zoloft prior to admission.  -Continue with Zoloft 100mg PO qd.
-Chronic, non-compliant with Zoloft prior to admission.  -Continue with Zoloft 100mg PO qd.

## 2017-08-18 DIAGNOSIS — Z92.21 PERSONAL HISTORY OF ANTINEOPLASTIC CHEMOTHERAPY: ICD-10-CM

## 2017-08-18 DIAGNOSIS — C78.7 SECONDARY MALIGNANT NEOPLASM OF LIVER AND INTRAHEPATIC BILE DUCT: ICD-10-CM

## 2017-08-18 DIAGNOSIS — N39.0 URINARY TRACT INFECTION, SITE NOT SPECIFIED: ICD-10-CM

## 2017-08-18 DIAGNOSIS — J18.9 PNEUMONIA, UNSPECIFIED ORGANISM: ICD-10-CM

## 2017-08-18 DIAGNOSIS — E86.0 DEHYDRATION: ICD-10-CM

## 2017-08-18 DIAGNOSIS — F41.8 OTHER SPECIFIED ANXIETY DISORDERS: ICD-10-CM

## 2017-08-18 DIAGNOSIS — C18.9 MALIGNANT NEOPLASM OF COLON, UNSPECIFIED: ICD-10-CM

## 2017-08-18 DIAGNOSIS — E78.5 HYPERLIPIDEMIA, UNSPECIFIED: ICD-10-CM

## 2017-08-18 DIAGNOSIS — I95.9 HYPOTENSION, UNSPECIFIED: ICD-10-CM

## 2017-08-18 DIAGNOSIS — R82.71 BACTERIURIA: ICD-10-CM

## 2017-08-18 DIAGNOSIS — I10 ESSENTIAL (PRIMARY) HYPERTENSION: ICD-10-CM

## 2017-08-18 DIAGNOSIS — R53.1 WEAKNESS: ICD-10-CM

## 2017-08-19 LAB
CULTURE RESULTS: SIGNIFICANT CHANGE UP
CULTURE RESULTS: SIGNIFICANT CHANGE UP
SPECIMEN SOURCE: SIGNIFICANT CHANGE UP
SPECIMEN SOURCE: SIGNIFICANT CHANGE UP

## 2017-10-16 ENCOUNTER — INPATIENT (INPATIENT)
Facility: HOSPITAL | Age: 72
LOS: 6 days | Discharge: HOME | DRG: 871 | End: 2017-10-23
Attending: HOSPITALIST
Payer: MEDICARE

## 2017-10-16 VITALS
HEIGHT: 66 IN | HEART RATE: 91 BPM | WEIGHT: 134.92 LBS | OXYGEN SATURATION: 94 % | DIASTOLIC BLOOD PRESSURE: 64 MMHG | SYSTOLIC BLOOD PRESSURE: 92 MMHG | TEMPERATURE: 96 F | RESPIRATION RATE: 16 BRPM

## 2017-10-16 DIAGNOSIS — R11.2 NAUSEA WITH VOMITING, UNSPECIFIED: ICD-10-CM

## 2017-10-16 DIAGNOSIS — I95.9 HYPOTENSION, UNSPECIFIED: ICD-10-CM

## 2017-10-16 DIAGNOSIS — E43 UNSPECIFIED SEVERE PROTEIN-CALORIE MALNUTRITION: ICD-10-CM

## 2017-10-16 DIAGNOSIS — Z90.49 ACQUIRED ABSENCE OF OTHER SPECIFIED PARTS OF DIGESTIVE TRACT: Chronic | ICD-10-CM

## 2017-10-16 DIAGNOSIS — C78.7 SECONDARY MALIGNANT NEOPLASM OF LIVER AND INTRAHEPATIC BILE DUCT: ICD-10-CM

## 2017-10-16 DIAGNOSIS — A41.9 SEPSIS, UNSPECIFIED ORGANISM: ICD-10-CM

## 2017-10-16 DIAGNOSIS — Z29.9 ENCOUNTER FOR PROPHYLACTIC MEASURES, UNSPECIFIED: ICD-10-CM

## 2017-10-16 DIAGNOSIS — Z40.02 ENCOUNTER FOR PROPHYLACTIC REMOVAL OF OVARY(S): Chronic | ICD-10-CM

## 2017-10-16 DIAGNOSIS — E87.5 HYPERKALEMIA: ICD-10-CM

## 2017-10-16 DIAGNOSIS — C18.9 MALIGNANT NEOPLASM OF COLON, UNSPECIFIED: ICD-10-CM

## 2017-10-16 DIAGNOSIS — D47.3 ESSENTIAL (HEMORRHAGIC) THROMBOCYTHEMIA: ICD-10-CM

## 2017-10-16 DIAGNOSIS — F41.9 ANXIETY DISORDER, UNSPECIFIED: ICD-10-CM

## 2017-10-16 PROBLEM — C22.9 MALIGNANT NEOPLASM OF LIVER, NOT SPECIFIED AS PRIMARY OR SECONDARY: Chronic | Status: ACTIVE | Noted: 2017-08-14

## 2017-10-16 LAB
ALBUMIN SERPL ELPH-MCNC: 2.1 G/DL — LOW (ref 3.3–5)
ALP SERPL-CCNC: 151 U/L — HIGH (ref 40–120)
ALT FLD-CCNC: 29 U/L — SIGNIFICANT CHANGE UP (ref 12–78)
AMYLASE P1 CFR SERPL: 39 U/L — SIGNIFICANT CHANGE UP (ref 25–115)
ANION GAP SERPL CALC-SCNC: 13 MMOL/L — SIGNIFICANT CHANGE UP (ref 5–17)
ANION GAP SERPL CALC-SCNC: 9 MMOL/L — SIGNIFICANT CHANGE UP (ref 5–17)
APPEARANCE UR: ABNORMAL
AST SERPL-CCNC: 55 U/L — HIGH (ref 15–37)
BILIRUB SERPL-MCNC: 0.2 MG/DL — SIGNIFICANT CHANGE UP (ref 0.2–1.2)
BILIRUB UR-MCNC: NEGATIVE — SIGNIFICANT CHANGE UP
BUN SERPL-MCNC: 11 MG/DL — SIGNIFICANT CHANGE UP (ref 7–23)
BUN SERPL-MCNC: 21 MG/DL — SIGNIFICANT CHANGE UP (ref 7–23)
CALCIUM SERPL-MCNC: 7.7 MG/DL — LOW (ref 8.5–10.1)
CALCIUM SERPL-MCNC: 8.6 MG/DL — SIGNIFICANT CHANGE UP (ref 8.5–10.1)
CHLORIDE SERPL-SCNC: 104 MMOL/L — SIGNIFICANT CHANGE UP (ref 96–108)
CHLORIDE SERPL-SCNC: 95 MMOL/L — LOW (ref 96–108)
CO2 SERPL-SCNC: 23 MMOL/L — SIGNIFICANT CHANGE UP (ref 22–31)
CO2 SERPL-SCNC: 28 MMOL/L — SIGNIFICANT CHANGE UP (ref 22–31)
COLOR SPEC: YELLOW — SIGNIFICANT CHANGE UP
CREAT SERPL-MCNC: 0.35 MG/DL — LOW (ref 0.5–1.3)
CREAT SERPL-MCNC: 0.47 MG/DL — LOW (ref 0.5–1.3)
DIFF PNL FLD: ABNORMAL
GLUCOSE SERPL-MCNC: 141 MG/DL — HIGH (ref 70–99)
GLUCOSE SERPL-MCNC: 80 MG/DL — SIGNIFICANT CHANGE UP (ref 70–99)
GLUCOSE UR QL: NEGATIVE — SIGNIFICANT CHANGE UP
HCT VFR BLD CALC: 39.8 % — SIGNIFICANT CHANGE UP (ref 34.5–45)
HGB BLD-MCNC: 12.2 G/DL — SIGNIFICANT CHANGE UP (ref 11.5–15.5)
KETONES UR-MCNC: NEGATIVE — SIGNIFICANT CHANGE UP
LACTATE SERPL-SCNC: 1.9 MMOL/L — SIGNIFICANT CHANGE UP (ref 0.7–2)
LACTATE SERPL-SCNC: 3.9 MMOL/L — HIGH (ref 0.7–2)
LEUKOCYTE ESTERASE UR-ACNC: ABNORMAL
LIDOCAIN IGE QN: 231 U/L — SIGNIFICANT CHANGE UP (ref 73–393)
LYMPHOCYTES # BLD AUTO: 2 % — LOW (ref 13–44)
MCHC RBC-ENTMCNC: 27.4 PG — SIGNIFICANT CHANGE UP (ref 27–34)
MCHC RBC-ENTMCNC: 30.7 GM/DL — LOW (ref 32–36)
MCV RBC AUTO: 89 FL — SIGNIFICANT CHANGE UP (ref 80–100)
MONOCYTES NFR BLD AUTO: 5 % — SIGNIFICANT CHANGE UP (ref 1–9)
NEUTROPHILS NFR BLD AUTO: 89 % — HIGH (ref 43–77)
NITRITE UR-MCNC: NEGATIVE — SIGNIFICANT CHANGE UP
NT-PROBNP SERPL-SCNC: 1623 PG/ML — HIGH (ref 0–125)
PH UR: 7 — SIGNIFICANT CHANGE UP (ref 5–8)
PLATELET # BLD AUTO: 404 K/UL — HIGH (ref 150–400)
POTASSIUM SERPL-MCNC: 4.2 MMOL/L — SIGNIFICANT CHANGE UP (ref 3.5–5.3)
POTASSIUM SERPL-MCNC: 5.9 MMOL/L — HIGH (ref 3.5–5.3)
POTASSIUM SERPL-SCNC: 4.2 MMOL/L — SIGNIFICANT CHANGE UP (ref 3.5–5.3)
POTASSIUM SERPL-SCNC: 5.9 MMOL/L — HIGH (ref 3.5–5.3)
PROCALCITONIN SERPL-MCNC: 0.41 NG/ML — HIGH (ref 0–0.04)
PROT SERPL-MCNC: 6.3 G/DL — SIGNIFICANT CHANGE UP (ref 6–8.3)
PROT UR-MCNC: 25 MG/DL
RBC # BLD: 4.47 M/UL — SIGNIFICANT CHANGE UP (ref 3.8–5.2)
RBC # FLD: 16.4 % — HIGH (ref 10.3–14.5)
SODIUM SERPL-SCNC: 136 MMOL/L — SIGNIFICANT CHANGE UP (ref 135–145)
SODIUM SERPL-SCNC: 136 MMOL/L — SIGNIFICANT CHANGE UP (ref 135–145)
SP GR SPEC: 1 — LOW (ref 1.01–1.02)
UROBILINOGEN FLD QL: NEGATIVE — SIGNIFICANT CHANGE UP
WBC # BLD: 15.4 K/UL — HIGH (ref 3.8–10.5)
WBC # FLD AUTO: 15.4 K/UL — HIGH (ref 3.8–10.5)

## 2017-10-16 PROCEDURE — 99281 EMR DPT VST MAYX REQ PHY/QHP: CPT

## 2017-10-16 PROCEDURE — 76705 ECHO EXAM OF ABDOMEN: CPT | Mod: 26

## 2017-10-16 PROCEDURE — 74177 CT ABD & PELVIS W/CONTRAST: CPT | Mod: 26

## 2017-10-16 PROCEDURE — 93010 ELECTROCARDIOGRAM REPORT: CPT

## 2017-10-16 PROCEDURE — 71010: CPT | Mod: 26

## 2017-10-16 PROCEDURE — 99222 1ST HOSP IP/OBS MODERATE 55: CPT | Mod: AI,GC

## 2017-10-16 PROCEDURE — 71275 CT ANGIOGRAPHY CHEST: CPT | Mod: 26

## 2017-10-16 RX ORDER — OXYCODONE AND ACETAMINOPHEN 5; 325 MG/1; MG/1
1 TABLET ORAL EVERY 6 HOURS
Qty: 0 | Refills: 0 | Status: DISCONTINUED | OUTPATIENT
Start: 2017-10-16 | End: 2017-10-21

## 2017-10-16 RX ORDER — SODIUM CHLORIDE 9 MG/ML
1000 INJECTION INTRAMUSCULAR; INTRAVENOUS; SUBCUTANEOUS ONCE
Qty: 0 | Refills: 0 | Status: COMPLETED | OUTPATIENT
Start: 2017-10-16 | End: 2017-10-16

## 2017-10-16 RX ORDER — SERTRALINE 25 MG/1
100 TABLET, FILM COATED ORAL DAILY
Qty: 0 | Refills: 0 | Status: DISCONTINUED | OUTPATIENT
Start: 2017-10-16 | End: 2017-10-23

## 2017-10-16 RX ORDER — CIPROFLOXACIN LACTATE 400MG/40ML
400 VIAL (ML) INTRAVENOUS EVERY 12 HOURS
Qty: 0 | Refills: 0 | Status: DISCONTINUED | OUTPATIENT
Start: 2017-10-17 | End: 2017-10-17

## 2017-10-16 RX ORDER — ENOXAPARIN SODIUM 100 MG/ML
40 INJECTION SUBCUTANEOUS EVERY 24 HOURS
Qty: 0 | Refills: 0 | Status: DISCONTINUED | OUTPATIENT
Start: 2017-10-16 | End: 2017-10-23

## 2017-10-16 RX ORDER — ALPRAZOLAM 0.25 MG
0.5 TABLET ORAL
Qty: 0 | Refills: 0 | Status: DISCONTINUED | OUTPATIENT
Start: 2017-10-16 | End: 2017-10-23

## 2017-10-16 RX ORDER — DOCUSATE SODIUM 100 MG
100 CAPSULE ORAL THREE TIMES A DAY
Qty: 0 | Refills: 0 | Status: DISCONTINUED | OUTPATIENT
Start: 2017-10-16 | End: 2017-10-23

## 2017-10-16 RX ORDER — SODIUM CHLORIDE 9 MG/ML
1850 INJECTION INTRAMUSCULAR; INTRAVENOUS; SUBCUTANEOUS ONCE
Qty: 0 | Refills: 0 | Status: COMPLETED | OUTPATIENT
Start: 2017-10-16 | End: 2017-10-16

## 2017-10-16 RX ORDER — SODIUM CHLORIDE 9 MG/ML
1000 INJECTION INTRAMUSCULAR; INTRAVENOUS; SUBCUTANEOUS
Qty: 0 | Refills: 0 | Status: DISCONTINUED | OUTPATIENT
Start: 2017-10-16 | End: 2017-10-17

## 2017-10-16 RX ORDER — CIPROFLOXACIN LACTATE 400MG/40ML
VIAL (ML) INTRAVENOUS
Qty: 0 | Refills: 0 | Status: DISCONTINUED | OUTPATIENT
Start: 2017-10-16 | End: 2017-10-16

## 2017-10-16 RX ORDER — ONDANSETRON 8 MG/1
4 TABLET, FILM COATED ORAL EVERY 6 HOURS
Qty: 0 | Refills: 0 | Status: DISCONTINUED | OUTPATIENT
Start: 2017-10-16 | End: 2017-10-19

## 2017-10-16 RX ORDER — SODIUM CHLORIDE 9 MG/ML
3 INJECTION INTRAMUSCULAR; INTRAVENOUS; SUBCUTANEOUS ONCE
Qty: 0 | Refills: 0 | Status: COMPLETED | OUTPATIENT
Start: 2017-10-16 | End: 2017-10-16

## 2017-10-16 RX ORDER — SENNA PLUS 8.6 MG/1
2 TABLET ORAL AT BEDTIME
Qty: 0 | Refills: 0 | Status: DISCONTINUED | OUTPATIENT
Start: 2017-10-16 | End: 2017-10-23

## 2017-10-16 RX ADMIN — SODIUM CHLORIDE 3 MILLILITER(S): 9 INJECTION INTRAMUSCULAR; INTRAVENOUS; SUBCUTANEOUS at 13:40

## 2017-10-16 RX ADMIN — SODIUM CHLORIDE 1000 MILLILITER(S): 9 INJECTION INTRAMUSCULAR; INTRAVENOUS; SUBCUTANEOUS at 19:50

## 2017-10-16 RX ADMIN — SODIUM CHLORIDE 1850 MILLILITER(S): 9 INJECTION INTRAMUSCULAR; INTRAVENOUS; SUBCUTANEOUS at 15:15

## 2017-10-16 RX ADMIN — SODIUM CHLORIDE 1000 MILLILITER(S): 9 INJECTION INTRAMUSCULAR; INTRAVENOUS; SUBCUTANEOUS at 14:12

## 2017-10-16 NOTE — H&P ADULT - PROBLEM SELECTOR PLAN 8
chronic satble  to be followed as outpatient with Oncology. (Dr. Cooper) chronic stable  to be followed as outpatient with Oncology. (Dr. Cooper)

## 2017-10-16 NOTE — H&P ADULT - PROBLEM SELECTOR PLAN 4
normalized on repeat BMP  continue to monitor  hypochloremia: likely 2/2 to vomiting  normlaized normalized on repeat BMP  no EKG changes(normal sinur rhythm)  continue to monitor  hypochloremia: likely 2/2 to vomiting  normlaized normalized on repeat BMP  no EKG changes(normal sinus rhythm)  continue to monitor  hypochloremia: likely 2/2 to vomiting  normlaized

## 2017-10-16 NOTE — H&P ADULT - PROBLEM SELECTOR PLAN 9
chronic. stable  patient on stivarga chemotherapy as outpatient  told to stop by Dr. Cooper (oncologist) for current acute process

## 2017-10-16 NOTE — ED ADULT TRIAGE NOTE - CHIEF COMPLAINT QUOTE
Sen from hematology/oncology for elevated WBC count, N/V/D, weakness, fatigue. Pt is C/O RUQ pain, states this is not new

## 2017-10-16 NOTE — ED PROVIDER NOTE - MEDICAL DECISION MAKING DETAILS
screening septic work up, EKG, chest x-ray, BNP, procalcitonin, CT angio chest/abd/pelvis, gallbladder sonogram, IVF, abx

## 2017-10-16 NOTE — H&P ADULT - NSHPPHYSICALEXAM_GEN_ALL_CORE
Physical Exam:  General: Cachetic femalt resting in bed  HEENT: NCAT, PERRLA, EOMI bl,   Neck: Supple, nontender, no mass  Neurology: A&Ox3, nonfocal,   Respiratory: decreased BS b/l  CV: RRR, +S1/S2, no murmurs, rubs or gallops  Abdominal: Pain in the right upper quadrant to palpation. Positive murphys sign negative rebound, rigidity and guarding  Extremities: No C/C/E, + peripheral pulses  MSK: Normal ROM, no joint erythema or warmth, no joint swelling   Skin: warm, dry, normal color, no rash or abnormal lesions

## 2017-10-16 NOTE — H&P ADULT - NSHPREVIEWOFSYSTEMS_GEN_ALL_CORE
Constitutional: denies fever, chills, diaphoresis   HEENT: denies blurry vision, difficulty hearing  Respiratory: denies SOB, BOWEN, cough, sputum production, wheezing, hemoptysis  Cardiovascular: denies CP, palpitations, edema  Gastrointestinal: admits nausea, vomiting, abdominal pain diarrhea, denies constipation, melena, hematochezia   Genitourinary: denies dysuria, frequency, urgency, hematuria   Skin/Breast: denies rash, itching  Musculoskeletal: denies myalgias, joint swelling,   Neurologic: admits weakness denies headache, dizziness, paresthesias, numbness/tingling  Psychiatric: admits slight anxiety  ROS negative except as noted above

## 2017-10-16 NOTE — H&P ADULT - ATTENDING COMMENTS
seen and examined.   colon ca s/p liver mets on Stivarga with new NV abn GB US   r/o yusra sepsis vs volu depletion   seen by gen surgery   continue  empiric iv abx blood cx   hida   iv fluid   consider consult with primary oncologist of admission   reviewed with family and patient   vte proph

## 2017-10-16 NOTE — H&P ADULT - ASSESSMENT
70 yo female patient with pmh of colon cancer s/p resection, liver metastasis, anxiety, benign ovarian tumor, and HLD admitted for sepsis likely 2/2 to cholecystitis

## 2017-10-16 NOTE — ED PROVIDER NOTE - CONDUCTED A DETAILED DISCUSSION WITH PATIENT AND/OR GUARDIAN REGARDING, MDM
lab results/return to ED if symptoms worsen, persist or questions arise/radiology results/need to admit

## 2017-10-16 NOTE — H&P ADULT - PROBLEM SELECTOR PLAN 3
s/p 3.8 L normal saline  continue IVF  lactate normalized  continue to monitor BP  fall precautions  consider ICU admission if not improved s/p 3.8 L normal saline  continue IVF  lactate normalized  continue to monitor BP  fall precautions  improved after fluids : 116/74

## 2017-10-16 NOTE — ED PROVIDER NOTE - OBJECTIVE STATEMENT
Pt is a 70 yo female who presents to the ED with increased WBC.  PMHx of depression, anxiety, HLD, HTN, osteoporosis, colon cancer stage IV with mets to the liver s/p colon resection was on IV chemo x 3 years ended 3-4 months ago now on oral chemo. Pt was last admitted 8/14-8/16 for sepsis, UTI.  Urine culture relieved Klebsiella pt was treated and discharged home on po Levaquin.  Pt reports that for the last 3-4 days she has been experiencing N/V/D, generalized weakness, and worsening RUQ pain (pt has been experiencing RUQ pain for the last month).  She further reports subjective fevers and intermittent lower abd pain.  She followed up with her outside physician where blood work relieved and elevated WBC.  Pt was sent to the ED for further work up.  Denies chills, constipation, CP, SOB, ext numbness or weakness

## 2017-10-16 NOTE — H&P ADULT - HISTORY OF PRESENT ILLNESS
72 yo female patient with pmh of colon cancer s/p resection, liver metastasis, anxiety, benign ovarian tumor, and HLD presenting with cc of nausea for 2 weeks and vomiting for the past 2 days. She started on her second course of chemotherapy on stivarga (started on 10/3 to end on 10/21). She went to see her oncologist today who told her to visit the ED for her symptoms and elevated WBC seen in the office. Vomiting is described as the the food that the patient eats and non bloody. Daughter states that patient gets abdominal discomfort at times that they attributed to dyspepsia since it was relieved by simethecone. Patient admits to abdominal pain located in the LUQ, stabbing in nature, non radiating rated as an 8/10 with no remitting or exacerbating factors. She is unsure if pain is related to food/bowel movements but states that she has decreased appetite and diarrhea. She admits to weakness and weight loss. Patient denies fever, sick contacts, recent travel, numbness, tingling, chest pain, sob, dizziness.  In the ED: BP 92/64 HR 91 RR 16 T 96.4 F SpO2 94% RA  WBC: 15.4  Neutrophils 89% Platelets 404 Lactate 3.9 Potassium 5.9 Alk Phos 151 Bili 0.2 Procal 0.41  Pro BNP 1623  CT chest/abdomen: Advanced emphysema. Multiple pulmonary parenchymal metastases. Hepatic metastases. Multistation metastatic adenopathy in the chest and abdomen. Small ascites. Small pericholecystic fluid nonspecific likely a secondary manifestation.   US GB: Layering sludge and/or stones within the lumen of the gallbladder wall with nonspecific gallbladder wall edema and/or thickening. consider HIDA  patient was given 2.8 L NS, 1 dose of levaquin

## 2017-10-16 NOTE — H&P ADULT - PROBLEM SELECTOR PLAN 2
iv zofran prn  aspiration precautions  montor renal indices for dehydration  continue IVF  encourage PO hydration

## 2017-10-16 NOTE — H&P ADULT - PMH
Anxiety    Colon cancer    Depression    History of Oophorectomy    History of Osteoporosis    Hyperlipemia    Hypertension    Liver cancer    Liver metastases

## 2017-10-16 NOTE — H&P ADULT - PROBLEM SELECTOR PLAN 1
likely 2/2 to cholecystitis   elevated lactate, procal and WBC with U/S showing sign of potential gallbladder infection  repeat lactate 1.9  patient received 1 dose of levaquin and 3.85 L of NS  f/u blood cx urine cx  f/u HIDA  Surgical consult likely 2/2 to cholecystitis   elevated lactate, procal and WBC with U/S showing sign of potential gallbladder infection  repeat lactate 1.9  patient received 1 dose of levaquin and 3.85 L of NS  start IV cipro  f/u blood cx urine cx  f/u HIDA  Surgical consult  admit to medicine

## 2017-10-16 NOTE — ED ADULT NURSE NOTE - OBJECTIVE STATEMENT
Pt presents to the ED, sent by PMD for abnormal labs, weakness, abd pain, currently on chemo, skin warm and dry, + sensation, + capillary refill < 2 sec. Pt denies nausea at this time

## 2017-10-16 NOTE — ED PROVIDER NOTE - CARE PLAN
Principal Discharge DX:	Sepsis  Instructions for follow-up, activity and diet:	admit  Secondary Diagnosis:	Colon cancer  Secondary Diagnosis:	Liver cancer

## 2017-10-17 LAB
ANION GAP SERPL CALC-SCNC: 9 MMOL/L — SIGNIFICANT CHANGE UP (ref 5–17)
BASOPHILS # BLD AUTO: 0.1 K/UL — SIGNIFICANT CHANGE UP (ref 0–0.2)
BASOPHILS NFR BLD AUTO: 0.4 % — SIGNIFICANT CHANGE UP (ref 0–2)
BUN SERPL-MCNC: 11 MG/DL — SIGNIFICANT CHANGE UP (ref 7–23)
CALCIUM SERPL-MCNC: 8 MG/DL — LOW (ref 8.5–10.1)
CHLORIDE SERPL-SCNC: 104 MMOL/L — SIGNIFICANT CHANGE UP (ref 96–108)
CO2 SERPL-SCNC: 25 MMOL/L — SIGNIFICANT CHANGE UP (ref 22–31)
CREAT SERPL-MCNC: 0.52 MG/DL — SIGNIFICANT CHANGE UP (ref 0.5–1.3)
EOSINOPHIL # BLD AUTO: 0 K/UL — SIGNIFICANT CHANGE UP (ref 0–0.5)
EOSINOPHIL NFR BLD AUTO: 0 % — SIGNIFICANT CHANGE UP (ref 0–6)
GLUCOSE SERPL-MCNC: 127 MG/DL — HIGH (ref 70–99)
HCT VFR BLD CALC: 36.4 % — SIGNIFICANT CHANGE UP (ref 34.5–45)
HGB BLD-MCNC: 11 G/DL — LOW (ref 11.5–15.5)
LYMPHOCYTES # BLD AUTO: 1 K/UL — SIGNIFICANT CHANGE UP (ref 1–3.3)
LYMPHOCYTES # BLD AUTO: 6.4 % — LOW (ref 13–44)
MCHC RBC-ENTMCNC: 27.3 PG — SIGNIFICANT CHANGE UP (ref 27–34)
MCHC RBC-ENTMCNC: 30.2 GM/DL — LOW (ref 32–36)
MCV RBC AUTO: 90.3 FL — SIGNIFICANT CHANGE UP (ref 80–100)
MONOCYTES # BLD AUTO: 0.8 K/UL — SIGNIFICANT CHANGE UP (ref 0–0.9)
MONOCYTES NFR BLD AUTO: 5.4 % — SIGNIFICANT CHANGE UP (ref 1–9)
NEUTROPHILS # BLD AUTO: 13.6 K/UL — HIGH (ref 1.8–7.4)
NEUTROPHILS NFR BLD AUTO: 87.7 % — HIGH (ref 43–77)
PLATELET # BLD AUTO: 348 K/UL — SIGNIFICANT CHANGE UP (ref 150–400)
POTASSIUM SERPL-MCNC: 4.4 MMOL/L — SIGNIFICANT CHANGE UP (ref 3.5–5.3)
POTASSIUM SERPL-SCNC: 4.4 MMOL/L — SIGNIFICANT CHANGE UP (ref 3.5–5.3)
RBC # BLD: 4.03 M/UL — SIGNIFICANT CHANGE UP (ref 3.8–5.2)
RBC # FLD: 16.4 % — HIGH (ref 10.3–14.5)
SODIUM SERPL-SCNC: 138 MMOL/L — SIGNIFICANT CHANGE UP (ref 135–145)
WBC # BLD: 15.5 K/UL — HIGH (ref 3.8–10.5)
WBC # FLD AUTO: 15.5 K/UL — HIGH (ref 3.8–10.5)

## 2017-10-17 PROCEDURE — 78226 HEPATOBILIARY SYSTEM IMAGING: CPT | Mod: 26

## 2017-10-17 PROCEDURE — 99233 SBSQ HOSP IP/OBS HIGH 50: CPT

## 2017-10-17 RX ORDER — METRONIDAZOLE 500 MG
500 TABLET ORAL ONCE
Qty: 0 | Refills: 0 | Status: COMPLETED | OUTPATIENT
Start: 2017-10-17 | End: 2017-10-17

## 2017-10-17 RX ORDER — METRONIDAZOLE 500 MG
500 TABLET ORAL EVERY 8 HOURS
Qty: 0 | Refills: 0 | Status: DISCONTINUED | OUTPATIENT
Start: 2017-10-17 | End: 2017-10-18

## 2017-10-17 RX ORDER — INFLUENZA VIRUS VACCINE 15; 15; 15; 15 UG/.5ML; UG/.5ML; UG/.5ML; UG/.5ML
0.5 SUSPENSION INTRAMUSCULAR ONCE
Qty: 0 | Refills: 0 | Status: DISCONTINUED | OUTPATIENT
Start: 2017-10-17 | End: 2017-10-23

## 2017-10-17 RX ORDER — METRONIDAZOLE 500 MG
TABLET ORAL
Qty: 0 | Refills: 0 | Status: DISCONTINUED | OUTPATIENT
Start: 2017-10-17 | End: 2017-10-18

## 2017-10-17 RX ORDER — SODIUM CHLORIDE 9 MG/ML
1000 INJECTION INTRAMUSCULAR; INTRAVENOUS; SUBCUTANEOUS
Qty: 0 | Refills: 0 | Status: DISCONTINUED | OUTPATIENT
Start: 2017-10-17 | End: 2017-10-19

## 2017-10-17 RX ORDER — MORPHINE SULFATE 50 MG/1
2 CAPSULE, EXTENDED RELEASE ORAL EVERY 6 HOURS
Qty: 0 | Refills: 0 | Status: DISCONTINUED | OUTPATIENT
Start: 2017-10-17 | End: 2017-10-21

## 2017-10-17 RX ADMIN — MORPHINE SULFATE 2 MILLIGRAM(S): 50 CAPSULE, EXTENDED RELEASE ORAL at 15:50

## 2017-10-17 RX ADMIN — SODIUM CHLORIDE 100 MILLILITER(S): 9 INJECTION INTRAMUSCULAR; INTRAVENOUS; SUBCUTANEOUS at 00:37

## 2017-10-17 RX ADMIN — SODIUM CHLORIDE 75 MILLILITER(S): 9 INJECTION INTRAMUSCULAR; INTRAVENOUS; SUBCUTANEOUS at 15:57

## 2017-10-17 RX ADMIN — Medication 100 MILLIGRAM(S): at 14:04

## 2017-10-17 RX ADMIN — MORPHINE SULFATE 2 MILLIGRAM(S): 50 CAPSULE, EXTENDED RELEASE ORAL at 16:00

## 2017-10-17 RX ADMIN — SODIUM CHLORIDE 75 MILLILITER(S): 9 INJECTION INTRAMUSCULAR; INTRAVENOUS; SUBCUTANEOUS at 17:08

## 2017-10-17 RX ADMIN — Medication 200 MILLIGRAM(S): at 06:18

## 2017-10-17 RX ADMIN — ONDANSETRON 4 MILLIGRAM(S): 8 TABLET, FILM COATED ORAL at 14:41

## 2017-10-17 RX ADMIN — OXYCODONE AND ACETAMINOPHEN 1 TABLET(S): 5; 325 TABLET ORAL at 00:41

## 2017-10-17 RX ADMIN — OXYCODONE AND ACETAMINOPHEN 1 TABLET(S): 5; 325 TABLET ORAL at 01:44

## 2017-10-17 RX ADMIN — Medication 100 MILLIGRAM(S): at 22:43

## 2017-10-17 RX ADMIN — SERTRALINE 100 MILLIGRAM(S): 25 TABLET, FILM COATED ORAL at 14:07

## 2017-10-17 RX ADMIN — ENOXAPARIN SODIUM 40 MILLIGRAM(S): 100 INJECTION SUBCUTANEOUS at 12:00

## 2017-10-17 NOTE — PROGRESS NOTE ADULT - PROBLEM SELECTOR PLAN 3
s/p 3.8 L normal saline  continue IVF  lactate normalized  continue to monitor BP  fall precautions  improved after fluids : 116/74 Most likely due to Sepsis. Resolved after IVF resuscitation .Continue to monitor BP  fall precautions

## 2017-10-17 NOTE — PROGRESS NOTE ADULT - ASSESSMENT
72 yo female patient with pmh of Colon cancer s/p resection @2011 , Liver metastasis s/p biopsy 04/15 Stivarga, Anxiety, Benign ovarian tumor s/p oophorectomy, HTN and HLD presenting with cc of nausea for 2 weeks and vomiting for the past 2 days. She started on her second course of chemotherapy on stivarga (started on 10/3 to end on 10/21). She went to see her oncologist today who told her to visit the ED for her symptoms and elevated WBC seen in the office. Vomiting is described as the the food that the patient eats and non bloody and admitted for sepsis due to suspected acute cholecystitis. Also complaining of midback pain.

## 2017-10-17 NOTE — PROGRESS NOTE ADULT - PROBLEM SELECTOR PLAN 9
chronic. stable  patient on stivarga chemotherapy as outpatient  told to stop by Dr. Cooper (oncologist) for current acute process chronic. stable  patient on Stivarga chemotherapy as outpatient  Told to stop by Dr. Cooper (oncologist) for current acute process

## 2017-10-17 NOTE — PROGRESS NOTE ADULT - ATTENDING COMMENTS
Diet: NPO.     RADIOLOGY & ADDITIONAL TESTS:    Imaging Personally Reviewed:  [ x] YES  [ ] NO    Consultant(s) Notes Reviewed:  [ ] YES  [x ] NO      DVT Prophylaxis:  Subcu Heparin [  ]     LMWH [ x]     Coumadin [ ]    Xaeralto [ ]    Eliquis [ ]   Venodyne pumps [ ]    Discussed with Patient [x ]     Family [ ]          [ ]   RN[ x]      [x ]    Advance Directives: Full code. Needs to be addressed. Called home and left message for .     Palliative Care: Palliative care consult.     Care Discussed with Consultants/Other Providers [x ] YES  [ ] NO   Dr. Barron and Dr. Dhillon.  PCP office contacted. 11. Back Pain: Order Morphine. Reviewed CT chest, abdomen and pelvis with radiologist. No obvious pathological lesion of spine. Could be referred pain. Order bone scan as recommended by radiologist to rule out mets to spine.     Diet: NPO for HIDA scan.     RADIOLOGY & ADDITIONAL TESTS:    Imaging Personally Reviewed:  [ x] YES  [ ] NO    Consultant(s) Notes Reviewed:  [ ] YES  [x ] NO      DVT Prophylaxis:  Subcu Heparin [  ]     LMWH [ x]     Coumadin [ ]    Xaeralto [ ]    Eliquis [ ]   Venodyne pumps [ ]    Discussed with Patient [x ]     Family [ ]          [ ]   RN[ x]      [x ]    Advance Directives: Full code. Needs to be addressed. Called home and left message for .     Palliative Care: Palliative care consult.     Care Discussed with Consultants/Other Providers [x ] YES  [ ] NO   Dr. Barron and Dr. Dhillon.  PCP office contacted.

## 2017-10-17 NOTE — PROGRESS NOTE ADULT - SUBJECTIVE AND OBJECTIVE BOX
PCP: Dr. Tamez   [phone]    Notified Yes  [x ]        No [ ]   Oct. 17, 2017  Oncologist: Dr. Jacquie Cooper - ONcologist [phone]  Consultant: Dr. Barron    CC: Patient is a 71y old  Female who presents with a chief complaint of nausea, vomiting (16 Oct 2017 21:13)    HPI: 72 yo female patient with pmh of Colon cancer s/p resection @ , Liver metastasis s/p biopsy 04/15 Stivarga, Anxiety, Benign ovarian tumor s/p oophorectomy, HTN and HLD presenting with cc of nausea for 2 weeks and vomiting for the past 2 days. She started on her second course of chemotherapy on stivarga (started on 10/3 to end on 10/21). She went to see her oncologist today who told her to visit the ED for her symptoms and elevated WBC seen in the office. Vomiting is described as the the food that the patient eats and non bloody. Daughter states that patient gets abdominal discomfort at times that they attributed to dyspepsia since it was relieved by simethecone. Patient admits to abdominal pain located in the LUQ, stabbing in nature, non radiating rated as an 8/10 with no remitting or exacerbating factors. She is unsure if pain is related to food/bowel movements but states that she has decreased appetite and diarrhea. She admits to weakness and weight loss. Patient denies fever, sick contacts, recent travel, numbness, tingling, chest pain, sob, dizziness.  In the ED: BP 92/64 HR 91 RR 16 T 96.4 F SpO2 94% RA  WBC: 15.4  Neutrophils 89% Platelets 404 Lactate 3.9 Potassium 5.9 Alk Phos 151 Bili 0.2 Procal 0.41  Pro BNP 1623  CT chest/abdomen: Advanced emphysema. Multiple pulmonary parenchymal metastases. Hepatic metastases. Multistation metastatic adenopathy in the chest and abdomen. Small ascites. Small pericholecystic fluid nonspecific likely a secondary manifestation.   US GB: Layering sludge and/or stones within the lumen of the gallbladder wall with nonspecific gallbladder wall edema and/or thickening. consider HIDA  patient was given 2.8 L NS, 1 dose of levaquin      10/17/17 Chart reviewed and events noted. Sitting in chair. No chest pain or shortness of breath but complaining midback pain radiating across. No nausea at present. No productive cough. No dysuria or hematuria.     REVIEW OF SYSTEMS: Rest of the review of systems is negative except noted in HPI.     T(C): 36.3 (10-17-17 @ 05:10), Max: 36.6 (10-16-17 @ 14:00)  HR: 67 (10-17-17 @ 05:10) (67 - 91)  BP: 120/76 (10-17-17 @ 05:10) (92/64 - 134/78)  RR: 16 (10-17-17 @ 05:10) (14 - 16)  SpO2: 92% (10-17-17 @ 05:10) (92% - 94%)  Wt(kg): --  I&O's Summary    16 Oct 2017 07:01  -  17 Oct 2017 07:00  --------------------------------------------------------  IN: 900 mL / OUT: 0 mL / NET: 900 mL    PHYSICAL EXAM:  GENERAL: NAD, well-groomed, well-developed  HEAD:  Atraumatic, Normocephalic  EYES: EOMI, PERRLA, conjunctiva and sclera clear  ENMT: No tonsillar erythema, exudates, or enlargement; Moist mucous membranes. No lesions.  NECK: Supple, No JVD, Normal thyroid  NERVOUS SYSTEM:  Alert & Oriented X3, Good concentration; Motor Strength 5/5 B/L upper and lower extremities.  CHEST/LUNG: Clear to percussion bilaterally; No rales, rhonchi, wheezing, or rubs  HEART: Regular rate and rhythm; No murmurs, rubs, or gallops  ABDOMEN: Soft, Nontender, Nondistended; Bowel sounds present  EXTREMITIES:  2+ Peripheral Pulses, No clubbing, cyanosis, or edema  LYMPH: No lymphadenopathy noted  SKIN: No rashes or lesions    LABS:                        11.0   15.5  )-----------( 348      ( 17 Oct 2017 09:21 )             36.4     10-17    138  |  104  |  11  ----------------------------<  127<H>  4.4   |  25  |  0.52    Ca    8.0<L>      17 Oct 2017 09:21    TPro  6.3  /  Alb  2.1<L>  /  TBili  0.2  /  DBili  x   /  AST  55<H>  /  ALT  29  /  AlkPhos  151<H>  10-16      Urinalysis Basic - ( 16 Oct 2017 15:47 )    Color: Yellow / Appearance: Slightly Turbid / S.000 / pH: x  Gluc: x / Ketone: Negative  / Bili: Negative / Urobili: Negative   Blood: x / Protein: 25 mg/dL / Nitrite: Negative   Leuk Esterase: Moderate / RBC: 6-10 /HPF / WBC 6-10   Sq Epi: x / Non Sq Epi: Few / Bacteria: Many      Active Medications  MEDICATIONS  (STANDING):  enoxaparin Injectable 40 milliGRAM(s) SubCutaneous every 24 hours  influenza   Vaccine 0.5 milliLiter(s) IntraMuscular once  levoFLOXacin IVPB      levoFLOXacin IVPB 500 milliGRAM(s) IV Intermittent once  metroNIDAZOLE  IVPB 500 milliGRAM(s) IV Intermittent every 8 hours  metroNIDAZOLE  IVPB 500 milliGRAM(s) IV Intermittent once  metroNIDAZOLE  IVPB      sertraline 100 milliGRAM(s) Oral daily  sodium chloride 0.9%. 1000 milliLiter(s) (100 mL/Hr) IV Continuous <Continuous>    MEDICATIONS  (PRN):  ALPRAZolam 0.5 milliGRAM(s) Oral two times a day PRN anxiety  docusate sodium 100 milliGRAM(s) Oral three times a day PRN Constipation  morphine  - Injectable 2 milliGRAM(s) IV Push every 6 hours PRN Severe Pain (7 - 10)  ondansetron Injectable 4 milliGRAM(s) IV Push every 6 hours PRN Nausea  oxyCODONE    5 mG/acetaminophen 325 mG 1 Tablet(s) Oral every 6 hours PRN Moderate Pain (4 - 6)  senna 2 Tablet(s) Oral at bedtime PRN Constipation PCP: Dr. Tamez   [phone]    Notified Yes  [x ]        No [ ]   Oct. 17, 2017  Oncologist: Dr. Jacquie Cooper - ONcologist [phone]  Consultant: Dr. Barron    CC: Patient is a 71y old  Female who presents with a chief complaint of nausea, vomiting (16 Oct 2017 21:13)    HPI: 72 yo female patient with pmh of Colon cancer s/p resection @ , Liver metastasis s/p biopsy 04/15 Stivarga, Anxiety, Benign ovarian tumor s/p oophorectomy, HTN and HLD presenting with cc of nausea for 2 weeks and vomiting for the past 2 days. She started on her second course of chemotherapy on stivarga (started on 10/3 to end on 10/21). She went to see her oncologist today who told her to visit the ED for her symptoms and elevated WBC seen in the office. Vomiting is described as the the food that the patient eats and non bloody. Daughter states that patient gets abdominal discomfort at times that they attributed to dyspepsia since it was relieved by simethecone. Patient admits to abdominal pain located in the LUQ, stabbing in nature, non radiating rated as an 8/10 with no remitting or exacerbating factors. She is unsure if pain is related to food/bowel movements but states that she has decreased appetite and diarrhea. She admits to weakness and weight loss. Patient denies fever, sick contacts, recent travel, numbness, tingling, chest pain, sob, dizziness.  In the ED: BP 92/64 HR 91 RR 16 T 96.4 F SpO2 94% RA  WBC: 15.4  Neutrophils 89% Platelets 404 Lactate 3.9 Potassium 5.9 Alk Phos 151 Bili 0.2 Procal 0.41  Pro BNP 1623  CT chest/abdomen: Advanced emphysema. Multiple pulmonary parenchymal metastases. Hepatic metastases. Multistation metastatic adenopathy in the chest and abdomen. Small ascites. Small pericholecystic fluid nonspecific likely a secondary manifestation.   US GB: Layering sludge and/or stones within the lumen of the gallbladder wall with nonspecific gallbladder wall edema and/or thickening. consider HIDA  patient was given 2.8 L NS, 1 dose of levaquin      10/17/17 Chart reviewed and events noted. Sitting in chair. No chest pain or shortness of breath but complaining midback pain radiating across. No nausea at present. No productive cough. No dysuria or hematuria.     REVIEW OF SYSTEMS: Rest of the review of systems is negative except noted in HPI.     T(C): 36.3 (10-17-17 @ 05:10), Max: 36.6 (10-16-17 @ 14:00)  HR: 67 (10-17-17 @ 05:10) (67 - 91)  BP: 120/76 (10-17-17 @ 05:10) (92/64 - 134/78)  RR: 16 (10-17-17 @ 05:10) (14 - 16)  SpO2: 92% (10-17-17 @ 05:10) (92% - 94%)  Wt(kg): --  I&O's Summary    16 Oct 2017 07:01  -  17 Oct 2017 07:00  --------------------------------------------------------  IN: 900 mL / OUT: 0 mL / NET: 900 mL    PHYSICAL EXAM:  GENERAL: NAD but ill looking.   HEAD:  Atraumatic, Normocephalic  EYES: EOMI, PERRLA, conjunctiva and sclera clear  ENMT: No tonsillar erythema, exudates, or enlargement; Moist mucous membranes. No lesions.  NECK: Supple, No JVD, Normal thyroid  NERVOUS SYSTEM:  Alert & Oriented X3, Motor Strength 5/5 B/L upper and lower extremities.  CHEST/LUNG: Clear to percussion bilaterally; No rales, rhonchi, wheezing, or rubs  HEART: Regular rate and rhythm; No murmurs, rubs, or gallops  ABDOMEN: Soft, Nontender, Nondistended; Bowel sounds present  EXTREMITIES:  1+ Peripheral Pulses, No clubbing, cyanosis, or edema  LYMPH: No lymphadenopathy noted  SKIN: No rashes or lesions.  BACK: No local spinal tenderness elicited.      LABS:                        11.0   15.5  )-----------( 348      ( 17 Oct 2017 09:21 )             36.4     10-17    138  |  104  |  11  ----------------------------<  127<H>  4.4   |  25  |  0.52    Ca    8.0<L>      17 Oct 2017 09:21    TPro  6.3  /  Alb  2.1<L>  /  TBili  0.2  /  DBili  x   /  AST  55<H>  /  ALT  29  /  AlkPhos  151<H>  10-16      Urinalysis Basic - ( 16 Oct 2017 15:47 )    Color: Yellow / Appearance: Slightly Turbid / S.000 / pH: x  Gluc: x / Ketone: Negative  / Bili: Negative / Urobili: Negative   Blood: x / Protein: 25 mg/dL / Nitrite: Negative   Leuk Esterase: Moderate / RBC: 6-10 /HPF / WBC 6-10   Sq Epi: x / Non Sq Epi: Few / Bacteria: Many      Active Medications  MEDICATIONS  (STANDING):  enoxaparin Injectable 40 milliGRAM(s) SubCutaneous every 24 hours  influenza   Vaccine 0.5 milliLiter(s) IntraMuscular once  levoFLOXacin IVPB      levoFLOXacin IVPB 500 milliGRAM(s) IV Intermittent once  metroNIDAZOLE  IVPB 500 milliGRAM(s) IV Intermittent every 8 hours  metroNIDAZOLE  IVPB 500 milliGRAM(s) IV Intermittent once  metroNIDAZOLE  IVPB      sertraline 100 milliGRAM(s) Oral daily  sodium chloride 0.9%. 1000 milliLiter(s) (100 mL/Hr) IV Continuous <Continuous>    MEDICATIONS  (PRN):  ALPRAZolam 0.5 milliGRAM(s) Oral two times a day PRN anxiety  docusate sodium 100 milliGRAM(s) Oral three times a day PRN Constipation  morphine  - Injectable 2 milliGRAM(s) IV Push every 6 hours PRN Severe Pain (7 - 10)  ondansetron Injectable 4 milliGRAM(s) IV Push every 6 hours PRN Nausea  oxyCODONE    5 mG/acetaminophen 325 mG 1 Tablet(s) Oral every 6 hours PRN Moderate Pain (4 - 6)  senna 2 Tablet(s) Oral at bedtime PRN Constipation PCP: Dr. Tamez   [phone]    Notified Yes  [x ]        No [ ]   Oct. 17, 2017  Oncologist: Dr. Jacquie Cooper - Oncologist [phone]  Consultant: Dr. Barron    CC: Patient is a 71y old  Female who presents with a chief complaint of nausea, vomiting (16 Oct 2017 21:13)    HPI: 72 yo female patient with pmh of Colon cancer s/p resection @ , Liver metastasis s/p biopsy 04/15 Stivarga, Anxiety, Benign ovarian tumor s/p oophorectomy, HTN and HLD presenting with cc of nausea for 2 weeks and vomiting for the past 2 days. She started on her second course of chemotherapy on stivarga (started on 10/3 to end on 10/21). She went to see her oncologist today who told her to visit the ED for her symptoms and elevated WBC seen in the office. Vomiting is described as the the food that the patient eats and non bloody. Daughter states that patient gets abdominal discomfort at times that they attributed to dyspepsia since it was relieved by simethecone. Patient admits to abdominal pain located in the LUQ, stabbing in nature, non radiating rated as an 8/10 with no remitting or exacerbating factors. She is unsure if pain is related to food/bowel movements but states that she has decreased appetite and diarrhea. She admits to weakness and weight loss. Patient denies fever, sick contacts, recent travel, numbness, tingling, chest pain, sob, dizziness.  In the ED: BP 92/64 HR 91 RR 16 T 96.4 F SpO2 94% RA  WBC: 15.4  Neutrophils 89% Platelets 404 Lactate 3.9 Potassium 5.9 Alk Phos 151 Bili 0.2 Procal 0.41  Pro BNP 1623  CT chest/abdomen: Advanced emphysema. Multiple pulmonary parenchymal metastases. Hepatic metastases. Multistation metastatic adenopathy in the chest and abdomen. Small ascites. Small pericholecystic fluid nonspecific likely a secondary manifestation.   US GB: Layering sludge and/or stones within the lumen of the gallbladder wall with nonspecific gallbladder wall edema and/or thickening. consider HIDA  patient was given 2.8 L NS, 1 dose of levaquin.    10/17/17 Chart reviewed and events noted. Sitting in chair. No chest pain or shortness of breath but complaining midback pain radiating across. No nausea at present. No productive cough. No dysuria or hematuria.     REVIEW OF SYSTEMS: Rest of the review of systems is negative except noted in HPI.     T(C): 36.3 (10-17-17 @ 05:10), Max: 36.6 (10-16-17 @ 14:00)  HR: 67 (10-17-17 @ 05:10) (67 - 91)  BP: 120/76 (10-17-17 @ 05:10) (92/64 - 134/78)  RR: 16 (10-17-17 @ 05:10) (14 - 16)  SpO2: 92% (10-17-17 @ 05:10) (92% - 94%)  Wt(kg): --  I&O's Summary    16 Oct 2017 07:01  -  17 Oct 2017 07:00  --------------------------------------------------------  IN: 900 mL / OUT: 0 mL / NET: 900 mL    PHYSICAL EXAM:  GENERAL: NAD but ill looking.   HEAD:  Atraumatic, Normocephalic  EYES: EOMI, PERRLA, conjunctiva and sclera clear  ENMT: No tonsillar erythema, exudates, or enlargement; Moist mucous membranes. No lesions.  NECK: Supple, No JVD, Normal thyroid  NERVOUS SYSTEM:  Alert & Oriented X3, Motor Strength 5/5 B/L upper and lower extremities.  CHEST/LUNG: Clear to percussion bilaterally; No rales, rhonchi, wheezing, or rubs  HEART: Regular rate and rhythm; No murmurs, rubs, or gallops  ABDOMEN: Soft, Nontender, Nondistended; Bowel sounds present  EXTREMITIES:  1+ Peripheral Pulses, No clubbing, cyanosis, or edema  LYMPH: No lymphadenopathy noted  SKIN: No rashes or lesions.  BACK: No local spinal tenderness elicited.      LABS:                        11.0   15.5  )-----------( 348      ( 17 Oct 2017 09:21 )             36.4     10-17    138  |  104  |  11  ----------------------------<  127<H>  4.4   |  25  |  0.52    Ca    8.0<L>      17 Oct 2017 09:21    TPro  6.3  /  Alb  2.1<L>  /  TBili  0.2  /  DBili  x   /  AST  55<H>  /  ALT  29  /  AlkPhos  151<H>  10-16      Urinalysis Basic - ( 16 Oct 2017 15:47 )    Color: Yellow / Appearance: Slightly Turbid / S.000 / pH: x  Gluc: x / Ketone: Negative  / Bili: Negative / Urobili: Negative   Blood: x / Protein: 25 mg/dL / Nitrite: Negative   Leuk Esterase: Moderate / RBC: 6-10 /HPF / WBC 6-10   Sq Epi: x / Non Sq Epi: Few / Bacteria: Many      Active Medications  MEDICATIONS  (STANDING):  enoxaparin Injectable 40 milliGRAM(s) SubCutaneous every 24 hours  influenza   Vaccine 0.5 milliLiter(s) IntraMuscular once  levoFLOXacin IVPB      levoFLOXacin IVPB 500 milliGRAM(s) IV Intermittent once  metroNIDAZOLE  IVPB 500 milliGRAM(s) IV Intermittent every 8 hours  metroNIDAZOLE  IVPB 500 milliGRAM(s) IV Intermittent once  metroNIDAZOLE  IVPB      sertraline 100 milliGRAM(s) Oral daily  sodium chloride 0.9%. 1000 milliLiter(s) (100 mL/Hr) IV Continuous <Continuous>    MEDICATIONS  (PRN):  ALPRAZolam 0.5 milliGRAM(s) Oral two times a day PRN anxiety  docusate sodium 100 milliGRAM(s) Oral three times a day PRN Constipation  morphine  - Injectable 2 milliGRAM(s) IV Push every 6 hours PRN Severe Pain (7 - 10)  ondansetron Injectable 4 milliGRAM(s) IV Push every 6 hours PRN Nausea  oxyCODONE    5 mG/acetaminophen 325 mG 1 Tablet(s) Oral every 6 hours PRN Moderate Pain (4 - 6)  senna 2 Tablet(s) Oral at bedtime PRN Constipation

## 2017-10-17 NOTE — PROGRESS NOTE ADULT - PROBLEM SELECTOR PLAN 1
likely 2/2 to cholecystitis   elevated lactate, procal and WBC with U/S showing sign of potential gallbladder infection  repeat lactate 1.9  patient received 1 dose of levaquin and 3.85 L of NS  start IV cipro  f/u blood cx urine cx  f/u HIDA  Surgical consult  admit to medicine Possibly due to acute cholecystitis   elevated lactate, procalcitonin and WBC with U/S showing sign of potential gallbladder infection  repeat lactate 1.9  patient received 1 dose of levaquin and 3.85 L of NS  Reorder Levaquin and Flagyl with PCN allergy. ID input.  HIDA scan ordered. Surgical input.   f/u blood cx urine cx  f/u HIDA  Surgical consult  admit to medicine

## 2017-10-17 NOTE — PROGRESS NOTE ADULT - PROBLEM SELECTOR PLAN 4
normalized on repeat BMP  no EKG changes(normal sinus rhythm)  continue to monitor  hypochloremia: likely 2/2 to vomiting  normlaized Resolved. Normalized on repeat BMP  no EKG changes(normal sinus rhythm)  continue to monitor

## 2017-10-17 NOTE — PROGRESS NOTE ADULT - PROBLEM SELECTOR PLAN 7
elevated pro-bnp  Patient non currently clinically symptomatic  consider echo to evaluate for CHF Elevated pro-bnp. Repeat BNP.   Patient non currently clinically symptomatic

## 2017-10-17 NOTE — PROGRESS NOTE ADULT - PROBLEM SELECTOR PLAN 8
chronic stable  to be followed as outpatient with Oncology. (Dr. Cooper) chronic stable  to be followed as outpatient with Oncology.

## 2017-10-18 DIAGNOSIS — D72.829 ELEVATED WHITE BLOOD CELL COUNT, UNSPECIFIED: ICD-10-CM

## 2017-10-18 DIAGNOSIS — N39.0 URINARY TRACT INFECTION, SITE NOT SPECIFIED: ICD-10-CM

## 2017-10-18 LAB
-  AMIKACIN: SIGNIFICANT CHANGE UP
-  AMPICILLIN/SULBACTAM: SIGNIFICANT CHANGE UP
-  AMPICILLIN: SIGNIFICANT CHANGE UP
-  AZTREONAM: SIGNIFICANT CHANGE UP
-  CEFAZOLIN: SIGNIFICANT CHANGE UP
-  CEFEPIME: SIGNIFICANT CHANGE UP
-  CEFOXITIN: SIGNIFICANT CHANGE UP
-  CEFTAZIDIME: SIGNIFICANT CHANGE UP
-  CEFTRIAXONE: SIGNIFICANT CHANGE UP
-  CIPROFLOXACIN: SIGNIFICANT CHANGE UP
-  ERTAPENEM: SIGNIFICANT CHANGE UP
-  GENTAMICIN: SIGNIFICANT CHANGE UP
-  IMIPENEM: SIGNIFICANT CHANGE UP
-  LEVOFLOXACIN: SIGNIFICANT CHANGE UP
-  MEROPENEM: SIGNIFICANT CHANGE UP
-  NITROFURANTOIN: SIGNIFICANT CHANGE UP
-  PIPERACILLIN/TAZOBACTAM: SIGNIFICANT CHANGE UP
-  TOBRAMYCIN: SIGNIFICANT CHANGE UP
-  TRIMETHOPRIM/SULFAMETHOXAZOLE: SIGNIFICANT CHANGE UP
ALBUMIN SERPL ELPH-MCNC: 1.7 G/DL — LOW (ref 3.3–5)
ALP SERPL-CCNC: 417 U/L — HIGH (ref 40–120)
ALT FLD-CCNC: 19 U/L — SIGNIFICANT CHANGE UP (ref 12–78)
ANION GAP SERPL CALC-SCNC: 10 MMOL/L — SIGNIFICANT CHANGE UP (ref 5–17)
AST SERPL-CCNC: 84 U/L — HIGH (ref 15–37)
BILIRUB DIRECT SERPL-MCNC: 0.4 MG/DL — HIGH (ref 0.05–0.2)
BILIRUB INDIRECT FLD-MCNC: 0.3 MG/DL — SIGNIFICANT CHANGE UP (ref 0.2–1)
BILIRUB SERPL-MCNC: 0.7 MG/DL — SIGNIFICANT CHANGE UP (ref 0.2–1.2)
BUN SERPL-MCNC: 9 MG/DL — SIGNIFICANT CHANGE UP (ref 7–23)
CALCIUM SERPL-MCNC: 7.9 MG/DL — LOW (ref 8.5–10.1)
CHLORIDE SERPL-SCNC: 103 MMOL/L — SIGNIFICANT CHANGE UP (ref 96–108)
CO2 SERPL-SCNC: 25 MMOL/L — SIGNIFICANT CHANGE UP (ref 22–31)
CREAT SERPL-MCNC: 0.42 MG/DL — LOW (ref 0.5–1.3)
CULTURE RESULTS: SIGNIFICANT CHANGE UP
GLUCOSE SERPL-MCNC: 97 MG/DL — SIGNIFICANT CHANGE UP (ref 70–99)
HCT VFR BLD CALC: 34.5 % — SIGNIFICANT CHANGE UP (ref 34.5–45)
HGB BLD-MCNC: 10.5 G/DL — LOW (ref 11.5–15.5)
MCHC RBC-ENTMCNC: 27.4 PG — SIGNIFICANT CHANGE UP (ref 27–34)
MCHC RBC-ENTMCNC: 30.4 GM/DL — LOW (ref 32–36)
MCV RBC AUTO: 90.1 FL — SIGNIFICANT CHANGE UP (ref 80–100)
METHOD TYPE: SIGNIFICANT CHANGE UP
NT-PROBNP SERPL-SCNC: 1524 PG/ML — HIGH (ref 0–125)
ORGANISM # SPEC MICROSCOPIC CNT: SIGNIFICANT CHANGE UP
ORGANISM # SPEC MICROSCOPIC CNT: SIGNIFICANT CHANGE UP
PLATELET # BLD AUTO: 301 K/UL — SIGNIFICANT CHANGE UP (ref 150–400)
POTASSIUM SERPL-MCNC: 4 MMOL/L — SIGNIFICANT CHANGE UP (ref 3.5–5.3)
POTASSIUM SERPL-SCNC: 4 MMOL/L — SIGNIFICANT CHANGE UP (ref 3.5–5.3)
PROT SERPL-MCNC: 6.1 G/DL — SIGNIFICANT CHANGE UP (ref 6–8.3)
RBC # BLD: 3.83 M/UL — SIGNIFICANT CHANGE UP (ref 3.8–5.2)
RBC # FLD: 16.4 % — HIGH (ref 10.3–14.5)
SODIUM SERPL-SCNC: 138 MMOL/L — SIGNIFICANT CHANGE UP (ref 135–145)
SPECIMEN SOURCE: SIGNIFICANT CHANGE UP
WBC # BLD: 10.7 K/UL — HIGH (ref 3.8–10.5)
WBC # FLD AUTO: 10.7 K/UL — HIGH (ref 3.8–10.5)

## 2017-10-18 PROCEDURE — 99233 SBSQ HOSP IP/OBS HIGH 50: CPT

## 2017-10-18 RX ORDER — ACETAMINOPHEN 500 MG
650 TABLET ORAL EVERY 8 HOURS
Qty: 0 | Refills: 0 | Status: DISCONTINUED | OUTPATIENT
Start: 2017-10-18 | End: 2017-10-21

## 2017-10-18 RX ORDER — FENTANYL CITRATE 50 UG/ML
1 INJECTION INTRAVENOUS
Qty: 0 | Refills: 0 | Status: DISCONTINUED | OUTPATIENT
Start: 2017-10-18 | End: 2017-10-20

## 2017-10-18 RX ADMIN — SODIUM CHLORIDE 75 MILLILITER(S): 9 INJECTION INTRAMUSCULAR; INTRAVENOUS; SUBCUTANEOUS at 05:41

## 2017-10-18 RX ADMIN — SERTRALINE 100 MILLIGRAM(S): 25 TABLET, FILM COATED ORAL at 11:47

## 2017-10-18 RX ADMIN — ENOXAPARIN SODIUM 40 MILLIGRAM(S): 100 INJECTION SUBCUTANEOUS at 11:47

## 2017-10-18 RX ADMIN — FENTANYL CITRATE 1 PATCH: 50 INJECTION INTRAVENOUS at 18:04

## 2017-10-18 RX ADMIN — Medication 100 MILLIGRAM(S): at 05:41

## 2017-10-18 RX ADMIN — ONDANSETRON 4 MILLIGRAM(S): 8 TABLET, FILM COATED ORAL at 12:01

## 2017-10-18 RX ADMIN — MORPHINE SULFATE 2 MILLIGRAM(S): 50 CAPSULE, EXTENDED RELEASE ORAL at 10:17

## 2017-10-18 RX ADMIN — MORPHINE SULFATE 2 MILLIGRAM(S): 50 CAPSULE, EXTENDED RELEASE ORAL at 04:17

## 2017-10-18 RX ADMIN — Medication 100 MILLIGRAM(S): at 13:23

## 2017-10-18 RX ADMIN — MORPHINE SULFATE 2 MILLIGRAM(S): 50 CAPSULE, EXTENDED RELEASE ORAL at 10:02

## 2017-10-18 RX ADMIN — ONDANSETRON 4 MILLIGRAM(S): 8 TABLET, FILM COATED ORAL at 18:04

## 2017-10-18 RX ADMIN — MORPHINE SULFATE 2 MILLIGRAM(S): 50 CAPSULE, EXTENDED RELEASE ORAL at 22:27

## 2017-10-18 RX ADMIN — MORPHINE SULFATE 2 MILLIGRAM(S): 50 CAPSULE, EXTENDED RELEASE ORAL at 03:47

## 2017-10-18 RX ADMIN — MORPHINE SULFATE 2 MILLIGRAM(S): 50 CAPSULE, EXTENDED RELEASE ORAL at 23:00

## 2017-10-18 RX ADMIN — SODIUM CHLORIDE 60 MILLILITER(S): 9 INJECTION INTRAMUSCULAR; INTRAVENOUS; SUBCUTANEOUS at 13:42

## 2017-10-18 NOTE — PROGRESS NOTE ADULT - ATTENDING COMMENTS
11. Back Pain: Better with Morphine. Reviewed CT chest, abdomen and pelvis with radiologist. No obvious pathological lesion of spine. Could be referred pain. Follow up bone scan as recommended by radiologist to rule out mets to spine.     Diet: Clears and advance as tolerated.     RADIOLOGY & ADDITIONAL TESTS:    Imaging Personally Reviewed:  [ x] YES  [ ] NO    Consultant(s) Notes Reviewed:  [ ] YES  [x ] NO      DVT Prophylaxis:  Subcu Heparin [  ]     LMWH [ x]     Coumadin [ ]    Xaeralto [ ]    Eliquis [ ]   Venodyne pumps [ ]    Discussed with Patient [x ]     Family [ ]          [ ]   RN[ x]      [x ]    Advance Directives: Full code. Needs to be addressed. Met with  and daughter yesterday and today  at bedside. Advance disease with guarded/poor prognosis. Goal is to control pain and switch to oral medications for safe disposition.     Palliative Care: Palliative care consult.     Care Discussed with Consultants/Other Providers [x ] YES  [ ] NO   Dr. Barron and Dr. Dhillon.  PCP office contacted.

## 2017-10-18 NOTE — PROGRESS NOTE ADULT - PROBLEM SELECTOR PLAN 9
Progression of the cancer. Has been treated with Stivarga chemotherapy as outpatient and off as per Dr. Cooper (oncologist) for current acute process. It seems patient has advance disease

## 2017-10-18 NOTE — PROGRESS NOTE ADULT - PROBLEM SELECTOR PLAN 8
CT scan consistent with progression as per discussion with oncologist and daughter. Daughter will bring a copy or CD of CT done recently outpatient to compare. A copy of CT results provided. Discussed goals of care and advance directives. Daughter is the HCP and mentioned patient's known wishes of DNR/DNI if no hope. Palliative care to see.

## 2017-10-18 NOTE — PROGRESS NOTE ADULT - PROBLEM SELECTOR PLAN 2
IV Zofran prn. HIDA scan negative. Most likely due to mets to liver. Pain control. Advance diet as tolerate. Noted surgical input.

## 2017-10-18 NOTE — PROGRESS NOTE ADULT - PROBLEM SELECTOR PLAN 3
Most likely due to Sepsis. Resolved after IVF resuscitation .Continue to monitor BP  fall precautions. Once eating better will discontinue IVF and reevaluate blood pressure.

## 2017-10-18 NOTE — PROGRESS NOTE ADULT - SUBJECTIVE AND OBJECTIVE BOX
possible cholecystitis based on ct findings. Acute intraabdominal process was ruled out by negative HIDA

## 2017-10-18 NOTE — CONSULT NOTE ADULT - SUBJECTIVE AND OBJECTIVE BOX
[facility], Division of Infectious Diseases  REBEKA Gregory, DAYNE Hartman    STEPHY POWELL  71y, Female  [id number]    HPI--  HPI:  72 yo female patient with pmh of colon cancer s/p resection, liver metastasis, anxiety, benign ovarian tumor, and HLD presenting with cc of nausea for 2 weeks and vomiting for the past 2 days also some diarrhea.   She started on her second course of chemotherapy on stivarga (started on 10/3 to end on 10/21). She went to see her oncologist today who told her to visit the ED for her symptoms and elevated WBC seen in the office. Vomiting is described as the the food that the patient eats and non bloody. Daughter states that patient gets abdominal discomfort at times that they attributed to dyspepsia since it was relieved by simethecone. Patient admits to abdominal pain located in the LUQ, stabbing in nature, non radiating rated as an 8/10 with no remitting or exacerbating factors. She is unsure if pain is related to food/bowel movements but states that she has decreased appetite and diarrhea. She admits to weakness and weight loss. Patient denies fever, sick contacts, recent travel, numbness, tingling, chest pain, sob, dizziness.  Now states she has tolerated some liquids  denies diarrhea and abd pain is improving  c/o back pain which is constant      PMH/PSH--  Liver metastases  Liver cancer  Colon cancer  History of Osteoporosis  Depression  Hypertension  History of Oophorectomy  Anxiety  Hyperlipemia  Ovary removal, prophylactic  History of colon resection      Allergies-- pcn      Medications--  Antibiotics: levoFLOXacin IVPB 500 milliGRAM(s) IV Intermittent every 24 hours  levoFLOXacin IVPB        Immunologic: influenza   Vaccine 0.5 milliLiter(s) IntraMuscular once    Other: acetaminophen   Tablet. PRN  ALPRAZolam PRN  docusate sodium PRN  enoxaparin Injectable  fentaNYL   Patch  12 MICROgram(s)/Hr  morphine  - Injectable PRN  ondansetron Injectable PRN  oxyCODONE    5 mG/acetaminophen 325 mG PRN  senna PRN  sertraline  sodium chloride 0.9%.      Social History--  EtOH: denies ***  Tobacco: former  Drug Use: denies ***  lives with  and daughet    Family/Marital History--  Family history of diabetes mellitus (Mother)      Remainder not relevant to clinical concern.    Travel/Environmental/[place]upational History:  [place]  Review of Systems:  A >=10-point review of systems was obtained.     Pertinent positives and negatives--  Constitutional: No fevers. No Chills. No Rigors.   Eyes: no blurry vision  ENMT: no dysphagia  Cardiovascular: No chest pain. No palpitations.  Respiratory: No shortness of breath. No cough.  Gastrointestinal: No nausea or vomiting. No diarrhea or constipation.   Genitourinary: no dysuria  Musculoskeletal: ++ back pain  Skin: no rash  Neurologic: no headache  Psychiatric: no anxiety    Review of systems otherwise negative except as previously noted.    Physical Exam--  Vital Signs: T(F): 97.7 (10-18-17 @ 14:47), Max: 98.4 (10-18-17 @ 05:02)  HR: 94 (10-18-17 @ 14:47)  BP: 135/83 (10-18-17 @ 14:47)  RR: 17 (10-18-17 @ 14:47)  SpO2: 90% (10-18-17 @ 14:47)  Wt(kg): --  General: Nontoxic-appearing Female in no acute distress.  HEENT: AT/NC. PE Anicteric. Conjunctiva pink and moist. Oropharynx clear. no teeth  Neck: Not rigid. No sense of mass.  Nodes: None palpable.  Lungs: Clear bilaterally without rales, wheezing or rhonchi  Heart: Regular rate and rhythm. No Murmur. No rub. No gallop. No palpable thrill.  Abdomen: Bowel sounds present and normoactive. Soft. Nondistended. Nontender.   Extremities: No cyanosis or clubbing. trace edema.   Skin: Warm. Dry. Good turgor. No rash. No vasculitic stigmata.  Psychiatric: appropriated        Laboratory & Imaging Data--  CBC                        10.5   10.7  )-----------( 301      ( 18 Oct 2017 07:30 )             34.5       Chemistries  10-18    138  |  103  |  9   ----------------------------<  97  4.0   |  25  |  0.42<L>    Ca    7.9<L>      18 Oct 2017 07:30    TPro  6.1  /  Alb  1.7<L>  /  TBili  0.7  /  DBili  .40<H>  /  AST  84<H>  /  ALT  19  /  AlkPhos  417<H>  10-18      Culture DataCulture - Urine (10.16.17 @ 21:20)    Specimen Source: .Urine Clean Catch (Midstream)    Culture Results:   >100,000 CFU/ml Escherichia coli      Culture - Blood (10.16.17 @ 18:24)    Specimen Source: .Blood Blood-Venous    Culture Results:   No growth to date.      < from: NM Hepatobiliary Scan w/wo Gall Bladder (10.17.17 @ 12:53) >    EXAM:  NM HEPATOBILIARY IMG                            PROCEDURE DATE:  10/17/2017          INTERPRETATION:  RADIOPHARMACEUTICAL: 3.0 mCi Tc-99m-Mebrofenin, I.V.    CLINICAL STATEMENT: 71-year-old female with metastatic colon cancer to   the liver presents with nausea, vomiting, left upper quadrant discomfort   . Ultrasound from 10/16/2017 showed layering sludge and/or stones within   the lumen of the gallbladder wall with nonspecific gallbladder wall edema   and/or thickening. Patient is referred to evaluate for acute   cholecystitis.    TECHNIQUE:  Dynamic imaging of the anterior abdomen was performed for 35   minutes following injection of radiotracer. Static images of the abdomen   in the anterior, right lateral, right anterior oblique views were   obtained immediately thereafter. CT angiogram of the chest and CT of the   abdomen and pelvis from 10/16/2017 was reviewed.    FINDINGS: There is heterogeneous uptake of radiotracer by the   hepatocytes. There is a large photopenic defectin the right hepatic lobe   corresponding to the large low-attenuation density on CT. Activity is   first seen in the gallbladder at 15 minutes and in the bowel at 30   minutes. There is good clearance of activity from the liver by the end of   the study.    IMPRESSION: Hepatobiliary scan demonstrates:    No evidence of acute cholecystitis.    Large photopenic area in the right hepatic lobe corresponding to the   large low-attenuation density compatible with metastatic disease on CT   from 10/16/2017.            < end of copied text >      < from: CT Abdomen and Pelvis w/ IV Cont (10.16.17 @ 16:29) >      < end of copied text >      < from: CT Angio Chest w/ IV Cont (10.16.17 @ 16:29) >    EXAM:  CT ANGIO CHEST (W)AW IC                            PROCEDURE DATE:  10/16/2017          INTERPRETATION:  History: Sepsis, hypotension, antecedent history of   cancer.    CTA chest and contrast-enhanced abdominal CT.   95 cc Omnipaque 350 injected intravenously.  Axial images coronal sagittal reformats, MIP images.  Satisfactory contrast bolus. No pulmonary emboli.  Nonaneurysmal thoracic aorta.  Patent central airways. Mild pretracheal adenopathy with enlarged lymph   node measuring up to 1.8 cm. Posterior mediastinal adenopathy with lymph   nodes measuring up to 2 cm. Nodular thyroid. Recommend ultrasound   correlation.  Fairly advanced emphysematous change. Multiple bilateral widely   disseminated pulmonary parenchymal nodules consistent with metastases.   Dependent atelectatic changes at the lung bases.  Heart not grossly enlarged. Small pericardial effusion. No calcified   gallstones or biliary dilatation. Trace cholecystic fluid is nonspecific   likely related to ascites or hypoalbuminemia. Cholecystitis considered   less likely.  Liver is enlarged with multiple metastatic foci including partially   calcified lesion right lobe. Spleen slightly enlarged.  Bulky metastatic periportal, portacaval, retroperitoneal adenopathy.  Colonic diverticula no diverticulitis or other active bowel inflammation.   No bowel obstruction.  Small right upper quadrant and pelvic ascites. No organized fluid   collections. No extraluminal gas. Calcified uterine fibroids. A pessary   noted in the pelvis. Bladder not remarkable.  Nonspecific sclerotic focus involving upper thoracic vertebral body   probable bone island. An early blastic lesion less likely.    Impression:    No pulmonary emboli.  Advanced emphysema.  Multiple pulmonary parenchymal metastases.  Hepatic metastases.  Multistation metastatic adenopathy in the chest and abdomen as described.  Small ascites.  Small pericholecystic fluid nonspecific likely a secondary manifestation.   If there is suspicion for cholecystitis HIDA scan might be considered.        < end of copied text >        Urinalysis (10.16.17 @ 15:47)    Glucose Qualitative, Urine: Negative    Blood, Urine: Large    pH Urine: 7.0    Color: Yellow    Urine Appearance: Slightly Turbid    Bilirubin: Negative    Ketone - Urine: Negative    Specific Gravity: 1.000    Protein, Urine: 25 mg/dL    Urobilinogen: Negative    Nitrite: Negative    Leukocyte Esterase Concentration: Moderate

## 2017-10-18 NOTE — CONSULT NOTE ADULT - PROBLEM SELECTOR RECOMMENDATION 2
f/u urine cx  not sure of active infection but abd pain   leukocytosis and + cx  can complete rx for uti f/u urine cx  not sure of active infection but abd pain   leukocytosis and + culture  can complete rx for uti with levofloxacin  and f/u cx sensitivities

## 2017-10-18 NOTE — PROGRESS NOTE ADULT - ASSESSMENT
70 yo female patient with pmh of Colon cancer s/p resection @2011 , Liver metastasis s/p biopsy 04/15 Stivarga, Anxiety, Benign ovarian tumor s/p oophorectomy, HTN and HLD presenting with cc of nausea for 2 weeks and vomiting for the past 2 days. She started on her second course of chemotherapy on stivarga (started on 10/3 to end on 10/21). She went to see her oncologist today who told her to visit the ED for her symptoms and elevated WBC seen in the office. Vomiting is described as the food that the patient eats and non bloody and admitted for sepsis due to suspected acute cholecystitis and midback pain. HIDA scan negative and urine culture growing E.Coli. Scheduled for bone scan today.

## 2017-10-18 NOTE — PROGRESS NOTE ADULT - PROBLEM SELECTOR PLAN 1
Possibly due to UTI cause by E.Coli.  as HIDA scan negative. IV Levaquin day #3 and IV Flagyl day #2. Will stop IV Flagyl as HIDA scan negative. Follow culture and ID input.

## 2017-10-18 NOTE — PROGRESS NOTE ADULT - SUBJECTIVE AND OBJECTIVE BOX
PCP: Dr. Tamez   [phone]    Notified Yes  [x ]        No [ ]   Oct. 17, 2017  Oncologist: Dr. Jacquie Cooper - Oncologist [phone]  Consultant: Dr. Barron    CC: Patient is a 71y old  Female who presents with a chief complaint of nausea, vomiting (16 Oct 2017 21:13)    HPI: 70 yo female patient with pmh of Colon cancer s/p resection @ , Liver metastasis s/p biopsy 04/15 Stivarga, Anxiety, Benign ovarian tumor s/p oophorectomy, HTN and HLD presenting with cc of nausea for 2 weeks and vomiting for the past 2 days. She started on her second course of chemotherapy on stivarga (started on 10/3 to end on 10/21). She went to see her oncologist today who told her to visit the ED for her symptoms and elevated WBC seen in the office. Vomiting is described as the the food that the patient eats and non bloody. Daughter states that patient gets abdominal discomfort at times that they attributed to dyspepsia since it was relieved by simethecone. Patient admits to abdominal pain located in the LUQ, stabbing in nature, non radiating rated as an 8/10 with no remitting or exacerbating factors. She is unsure if pain is related to food/bowel movements but states that she has decreased appetite and diarrhea. She admits to weakness and weight loss. Patient denies fever, sick contacts, recent travel, numbness, tingling, chest pain, sob, dizziness.  In the ED: BP 92/64 HR 91 RR 16 T 96.4 F SpO2 94% RA  WBC: 15.4  Neutrophils 89% Platelets 404 Lactate 3.9 Potassium 5.9 Alk Phos 151 Bili 0.2 Procal 0.41  Pro BNP 1623  CT chest/abdomen: Advanced emphysema. Multiple pulmonary parenchymal metastases. Hepatic metastases. Multistation metastatic adenopathy in the chest and abdomen. Small ascites. Small pericholecystic fluid nonspecific likely a secondary manifestation.   US GB: Layering sludge and/or stones within the lumen of the gallbladder wall with nonspecific gallbladder wall edema and/or thickening. consider HIDA  patient was given 2.8 L NS, 1 dose of levaquin.    10/17/17 Chart reviewed and events noted. Sitting in chair. No chest pain or shortness of breath but complaining midback pain radiating across. No nausea at present. No productive cough. No dysuria or hematuria.   10/18/17 Chart reviewed and test results and surgical consult noted. Met with  and daughter yesterday and discussed the care including findings on CT consistent of progression of disease. MD updated conversation with private oncologist and a copy of CT scan was provided to daughter who is the HCP. Patient continue to experience pain in epigastric and midback and going to have bone scan today. Intermittent nausea without vomiting. No chest pain or shortness of breath. Back pain is across. Morphine seems to help and used 3 times last 24 hours.  at bedside.     REVIEW OF SYSTEMS: Rest of the review of systems is negative except noted in HPI.     T(C): 36.9 (10-18-17 @ 05:02), Max: 36.9 (10-17-17 @ 14:53)  HR: 77 (10-18-17 @ 05:02) (72 - 77)  BP: 123/80 (10-18-17 @ 05:02) (123/80 - 153/89)  RR: 17 (10-18-17 @ 05:02) (17 - 17)  SpO2: 92% (10-18-17 @ 05:02) (91% - 92%)  Wt(kg): --  I&O's Summary    17 Oct 2017 07:  -  18 Oct 2017 07:00  --------------------------------------------------------  IN: 1440 mL / OUT: 0 mL / NET: 1440 mL    18 Oct 2017 07:  -  18 Oct 2017 13:10  --------------------------------------------------------  IN: 100 mL / OUT: 0 mL / NET: 100 mL      PHYSICAL EXAM:  GENERAL: NAD but ill looking.   HEAD:  Atraumatic, Normocephalic  EYES: EOMI, PERRLA, conjunctiva and sclera clear  ENMT: No tonsillar erythema, exudates, or enlargement; Moist mucous membranes. No lesions.  NECK: Supple, No JVD, Normal thyroid  NERVOUS SYSTEM:  Alert & Oriented X3, Motor Strength 5/5 B/L upper and lower extremities.  CHEST/LUNG: Clear to percussion bilaterally; No rales, rhonchi, wheezing, or rubs  HEART: Regular rate and rhythm; No murmurs, rubs, or gallops  ABDOMEN: Soft, Nontender, Nondistended; Bowel sounds present  EXTREMITIES:  1+ Peripheral Pulses, No clubbing, cyanosis, or edema  LYMPH: No lymphadenopathy noted  SKIN: No rashes or lesions.  BACK: No local spinal tenderness elicited.      LABS:                        10.5   10.7  )-----------( 301      ( 18 Oct 2017 07:30 )             34.5     10-18    138  |  103  |  9   ----------------------------<  97  4.0   |  25  |  0.42<L>    Ca    7.9<L>      18 Oct 2017 07:30    TPro  6.1  /  Alb  1.7<L>  /  TBili  0.7  /  DBili  .40<H>  /  AST  84<H>  /  ALT  19  /  AlkPhos  417<H>  1018      Urinalysis Basic - ( 16 Oct 2017 15:47 )    Color: Yellow / Appearance: Slightly Turbid / S.000 / pH: x  Gluc: x / Ketone: Negative  / Bili: Negative / Urobili: Negative   Blood: x / Protein: 25 mg/dL / Nitrite: Negative   Leuk Esterase: Moderate / RBC: 6-10 /HPF / WBC 6-10   Sq Epi: x / Non Sq Epi: Few / Bacteria: Many      10-16 @ 21:20   >100,000 CFU/ml Escherichia coli  --  --  10-16 @ 18:24   No growth to date.  --  --    Active Medications  MEDICATIONS  (STANDING):  enoxaparin Injectable 40 milliGRAM(s) SubCutaneous every 24 hours  influenza   Vaccine 0.5 milliLiter(s) IntraMuscular once  levoFLOXacin IVPB      levoFLOXacin IVPB 500 milliGRAM(s) IV Intermittent every 24 hours  metroNIDAZOLE  IVPB 500 milliGRAM(s) IV Intermittent every 8 hours  metroNIDAZOLE  IVPB      sertraline 100 milliGRAM(s) Oral daily  sodium chloride 0.9%. 1000 milliLiter(s) (75 mL/Hr) IV Continuous <Continuous>    MEDICATIONS  (PRN):  ALPRAZolam 0.5 milliGRAM(s) Oral two times a day PRN anxiety  docusate sodium 100 milliGRAM(s) Oral three times a day PRN Constipation  morphine  - Injectable 2 milliGRAM(s) IV Push every 6 hours PRN Severe Pain (7 - 10)  ondansetron Injectable 4 milliGRAM(s) IV Push every 6 hours PRN Nausea  oxyCODONE    5 mG/acetaminophen 325 mG 1 Tablet(s) Oral every 6 hours PRN Moderate Pain (4 - 6)  senna 2 Tablet(s) Oral at bedtime PRN Constipation

## 2017-10-18 NOTE — CONSULT NOTE ADULT - ASSESSMENT
71F with colon cancer chemo with stirvaga last dose 3 days ago  admitted with nausea, vomiting, diarrhea, abd pain and leukocytosis  ct scan without any acute path noted  urine cx with ecoli  blood cx neg  and leukocytosis has now resolved  she has no symptoms of uti and urinalysis not terribly impressive  but can f/u urine cx and complete 3 days of antibiotic therapy 71F with colon cancer chemo with stirvaga last dose 3 days ago  admitted with nausea, vomiting, diarrhea, abd pain and leukocytosis  ct scan without any acute path noted  urine cx with ecoli  blood cx neg  and leukocytosis has now resolved  she has no symptoms of uti but urinalysis with blood and some wbc   f/u urine cx sensitivity and treat

## 2017-10-19 DIAGNOSIS — E87.6 HYPOKALEMIA: ICD-10-CM

## 2017-10-19 DIAGNOSIS — G89.29 OTHER CHRONIC PAIN: ICD-10-CM

## 2017-10-19 DIAGNOSIS — R94.31 ABNORMAL ELECTROCARDIOGRAM [ECG] [EKG]: ICD-10-CM

## 2017-10-19 LAB
ALBUMIN SERPL ELPH-MCNC: 1.7 G/DL — LOW (ref 3.3–5)
ALP SERPL-CCNC: 418 U/L — HIGH (ref 40–120)
ALT FLD-CCNC: 21 U/L — SIGNIFICANT CHANGE UP (ref 12–78)
ANION GAP SERPL CALC-SCNC: 12 MMOL/L — SIGNIFICANT CHANGE UP (ref 5–17)
AST SERPL-CCNC: 80 U/L — HIGH (ref 15–37)
BILIRUB DIRECT SERPL-MCNC: 0.5 MG/DL — HIGH (ref 0.05–0.2)
BILIRUB INDIRECT FLD-MCNC: 0.5 MG/DL — SIGNIFICANT CHANGE UP (ref 0.2–1)
BILIRUB SERPL-MCNC: 1 MG/DL — SIGNIFICANT CHANGE UP (ref 0.2–1.2)
BUN SERPL-MCNC: 5 MG/DL — LOW (ref 7–23)
CALCIUM SERPL-MCNC: 8.1 MG/DL — LOW (ref 8.5–10.1)
CHLORIDE SERPL-SCNC: 100 MMOL/L — SIGNIFICANT CHANGE UP (ref 96–108)
CO2 SERPL-SCNC: 26 MMOL/L — SIGNIFICANT CHANGE UP (ref 22–31)
CREAT SERPL-MCNC: 0.43 MG/DL — LOW (ref 0.5–1.3)
GLUCOSE SERPL-MCNC: 97 MG/DL — SIGNIFICANT CHANGE UP (ref 70–99)
HCT VFR BLD CALC: 35.5 % — SIGNIFICANT CHANGE UP (ref 34.5–45)
HGB BLD-MCNC: 10.8 G/DL — LOW (ref 11.5–15.5)
MAGNESIUM SERPL-MCNC: 1.9 MG/DL — SIGNIFICANT CHANGE UP (ref 1.6–2.6)
MCHC RBC-ENTMCNC: 27.3 PG — SIGNIFICANT CHANGE UP (ref 27–34)
MCHC RBC-ENTMCNC: 30.6 GM/DL — LOW (ref 32–36)
MCV RBC AUTO: 89.4 FL — SIGNIFICANT CHANGE UP (ref 80–100)
PLATELET # BLD AUTO: 312 K/UL — SIGNIFICANT CHANGE UP (ref 150–400)
POTASSIUM SERPL-MCNC: 3.3 MMOL/L — LOW (ref 3.5–5.3)
POTASSIUM SERPL-SCNC: 3.3 MMOL/L — LOW (ref 3.5–5.3)
PROT SERPL-MCNC: 6.3 G/DL — SIGNIFICANT CHANGE UP (ref 6–8.3)
RBC # BLD: 3.97 M/UL — SIGNIFICANT CHANGE UP (ref 3.8–5.2)
RBC # FLD: 16.9 % — HIGH (ref 10.3–14.5)
SODIUM SERPL-SCNC: 138 MMOL/L — SIGNIFICANT CHANGE UP (ref 135–145)
WBC # BLD: 11.8 K/UL — HIGH (ref 3.8–10.5)
WBC # FLD AUTO: 11.8 K/UL — HIGH (ref 3.8–10.5)

## 2017-10-19 PROCEDURE — 87040 BLOOD CULTURE FOR BACTERIA: CPT

## 2017-10-19 PROCEDURE — 99223 1ST HOSP IP/OBS HIGH 75: CPT

## 2017-10-19 PROCEDURE — 84443 ASSAY THYROID STIM HORMONE: CPT

## 2017-10-19 PROCEDURE — 82550 ASSAY OF CK (CPK): CPT

## 2017-10-19 PROCEDURE — 99233 SBSQ HOSP IP/OBS HIGH 50: CPT

## 2017-10-19 PROCEDURE — 93010 ELECTROCARDIOGRAM REPORT: CPT

## 2017-10-19 PROCEDURE — 82553 CREATINE MB FRACTION: CPT

## 2017-10-19 PROCEDURE — 87186 SC STD MICRODIL/AGAR DIL: CPT

## 2017-10-19 PROCEDURE — 84480 ASSAY TRIIODOTHYRONINE (T3): CPT

## 2017-10-19 PROCEDURE — 81001 URINALYSIS AUTO W/SCOPE: CPT

## 2017-10-19 PROCEDURE — 80048 BASIC METABOLIC PNL TOTAL CA: CPT

## 2017-10-19 PROCEDURE — 97161 PT EVAL LOW COMPLEX 20 MIN: CPT

## 2017-10-19 PROCEDURE — 80053 COMPREHEN METABOLIC PANEL: CPT

## 2017-10-19 PROCEDURE — 80061 LIPID PANEL: CPT

## 2017-10-19 PROCEDURE — 78306 BONE IMAGING WHOLE BODY: CPT | Mod: 26

## 2017-10-19 PROCEDURE — 71045 X-RAY EXAM CHEST 1 VIEW: CPT

## 2017-10-19 PROCEDURE — 85027 COMPLETE CBC AUTOMATED: CPT

## 2017-10-19 PROCEDURE — 96375 TX/PRO/DX INJ NEW DRUG ADDON: CPT

## 2017-10-19 PROCEDURE — 99285 EMERGENCY DEPT VISIT HI MDM: CPT | Mod: 25

## 2017-10-19 PROCEDURE — 87086 URINE CULTURE/COLONY COUNT: CPT

## 2017-10-19 PROCEDURE — 85730 THROMBOPLASTIN TIME PARTIAL: CPT

## 2017-10-19 PROCEDURE — 85610 PROTHROMBIN TIME: CPT

## 2017-10-19 PROCEDURE — 84145 PROCALCITONIN (PCT): CPT

## 2017-10-19 PROCEDURE — 83605 ASSAY OF LACTIC ACID: CPT

## 2017-10-19 PROCEDURE — 71046 X-RAY EXAM CHEST 2 VIEWS: CPT

## 2017-10-19 PROCEDURE — 93005 ELECTROCARDIOGRAM TRACING: CPT

## 2017-10-19 PROCEDURE — 82150 ASSAY OF AMYLASE: CPT

## 2017-10-19 PROCEDURE — 83690 ASSAY OF LIPASE: CPT

## 2017-10-19 PROCEDURE — 84484 ASSAY OF TROPONIN QUANT: CPT

## 2017-10-19 PROCEDURE — 84436 ASSAY OF TOTAL THYROXINE: CPT

## 2017-10-19 PROCEDURE — 96365 THER/PROPH/DIAG IV INF INIT: CPT

## 2017-10-19 RX ORDER — POTASSIUM CHLORIDE 20 MEQ
40 PACKET (EA) ORAL ONCE
Qty: 0 | Refills: 0 | Status: COMPLETED | OUTPATIENT
Start: 2017-10-19 | End: 2017-10-19

## 2017-10-19 RX ORDER — ONDANSETRON 8 MG/1
4 TABLET, FILM COATED ORAL ONCE
Qty: 0 | Refills: 0 | Status: COMPLETED | OUTPATIENT
Start: 2017-10-19 | End: 2017-10-19

## 2017-10-19 RX ORDER — SODIUM CHLORIDE 9 MG/ML
1000 INJECTION, SOLUTION INTRAVENOUS
Qty: 0 | Refills: 0 | Status: DISCONTINUED | OUTPATIENT
Start: 2017-10-19 | End: 2017-10-20

## 2017-10-19 RX ORDER — ONDANSETRON 8 MG/1
4 TABLET, FILM COATED ORAL EVERY 6 HOURS
Qty: 0 | Refills: 0 | Status: DISCONTINUED | OUTPATIENT
Start: 2017-10-19 | End: 2017-10-23

## 2017-10-19 RX ADMIN — Medication 0.5 MILLIGRAM(S): at 22:58

## 2017-10-19 RX ADMIN — Medication 10 MILLIGRAM(S): at 17:45

## 2017-10-19 RX ADMIN — SODIUM CHLORIDE 60 MILLILITER(S): 9 INJECTION INTRAMUSCULAR; INTRAVENOUS; SUBCUTANEOUS at 01:17

## 2017-10-19 RX ADMIN — Medication 0.5 MILLIGRAM(S): at 17:44

## 2017-10-19 RX ADMIN — Medication 40 MILLIEQUIVALENT(S): at 10:06

## 2017-10-19 RX ADMIN — ENOXAPARIN SODIUM 40 MILLIGRAM(S): 100 INJECTION SUBCUTANEOUS at 11:23

## 2017-10-19 RX ADMIN — MORPHINE SULFATE 2 MILLIGRAM(S): 50 CAPSULE, EXTENDED RELEASE ORAL at 06:05

## 2017-10-19 RX ADMIN — ONDANSETRON 4 MILLIGRAM(S): 8 TABLET, FILM COATED ORAL at 12:38

## 2017-10-19 RX ADMIN — MORPHINE SULFATE 2 MILLIGRAM(S): 50 CAPSULE, EXTENDED RELEASE ORAL at 05:31

## 2017-10-19 RX ADMIN — ONDANSETRON 4 MILLIGRAM(S): 8 TABLET, FILM COATED ORAL at 11:23

## 2017-10-19 RX ADMIN — MORPHINE SULFATE 2 MILLIGRAM(S): 50 CAPSULE, EXTENDED RELEASE ORAL at 22:48

## 2017-10-19 RX ADMIN — SERTRALINE 100 MILLIGRAM(S): 25 TABLET, FILM COATED ORAL at 11:23

## 2017-10-19 RX ADMIN — ONDANSETRON 4 MILLIGRAM(S): 8 TABLET, FILM COATED ORAL at 01:17

## 2017-10-19 RX ADMIN — ONDANSETRON 4 MILLIGRAM(S): 8 TABLET, FILM COATED ORAL at 17:44

## 2017-10-19 RX ADMIN — SODIUM CHLORIDE 60 MILLILITER(S): 9 INJECTION, SOLUTION INTRAVENOUS at 10:15

## 2017-10-19 NOTE — CHART NOTE - NSCHARTNOTEFT_GEN_A_CORE
Upon Nutritional Assessment by the Registered Dietitian your patient was determined to meet criteria / has evidence of the following diagnosis/diagnoses:          [ ]  Mild Protein Calorie Malnutrition        [ ]  Moderate Protein Calorie Malnutrition        [x ] Severe Protein Calorie Malnutrition        [ ] Unspecified Protein Calorie Malnutrition        [ ] Underweight / BMI <19        [ ] Morbid Obesity / BMI > 40      Findings as based on:  •  Comprehensive nutrition assessment and consultation  •  Calorie counts (nutrient intake analysis)  •  Food acceptance and intake status from observations by staff  •  Follow up  •  Patient education  •  Intervention secondary to interdisciplinary rounds  •   concerns  30# wt loss, meeting less than 75% energy needs    Treatment:    The following diet has been recommended:  regular diet with ensure enlive BID as tolerated    PROVIDER Section:     By signing this assessment you are acknowledging and agree with the diagnosis/diagnoses assigned by the Registered Dietitian    Comments:

## 2017-10-19 NOTE — CONSULT NOTE ADULT - SUBJECTIVE AND OBJECTIVE BOX
Patient seen and evaluated @   Chief Complaint:     HPI:  70 yo female patient with pmh of colon cancer s/p resection, liver metastasis, anxiety, benign ovarian tumor, and HLD presenting with cc of nausea for 2 weeks and vomiting for the past 2 days. She started on her second course of chemotherapy on stivarga (started on 10/3 to end on 10/21). She went to see her oncologist today who told her to visit the ED for her symptoms and elevated WBC seen in the office. Vomiting is described as the the food that the patient eats and non bloody. Daughter states that patient gets abdominal discomfort at times that they attributed to dyspepsia since it was relieved by simethecone. Patient admits to abdominal pain located in the LUQ, stabbing in nature, non radiating rated as an 8/10 with no remitting or exacerbating factors. She is unsure if pain is related to food/bowel movements but states that she has decreased appetite and diarrhea. She admits to weakness and weight loss. Patient denies fever, sick contacts, recent travel, numbness, tingling, chest pain, sob, dizziness.  In the ED: BP 92/64 HR 91 RR 16 T 96.4 F SpO2 94% RA  WBC: 15.4  Neutrophils 89% Platelets 404 Lactate 3.9 Potassium 5.9 Alk Phos 151 Bili 0.2 Procal 0.41  Pro BNP 1623  CT chest/abdomen: Advanced emphysema. Multiple pulmonary parenchymal metastases. Hepatic metastases. Multistation metastatic adenopathy in the chest and abdomen. Small ascites. Small pericholecystic fluid nonspecific likely a secondary manifestation.   US GB: Layering sludge and/or stones within the lumen of the gallbladder wall with nonspecific gallbladder wall edema and/or thickening. consider HIDA  patient was given 2.8 L NS, 1 dose of levaquin (16 Oct 2017 21:13)      PMH:   Liver metastases  Liver cancer  Colon cancer  History of Osteoporosis  Depression  Hypertension  History of Oophorectomy  Anxiety  Hyperlipemia    PSH:   Ovary removal, prophylactic  History of colon resection      FAMILY HISTORY:  Family history of diabetes mellitus (Mother)    Social History:  Smoking:  Alcohol:  Drugs:    Allergies:  penicillin (Other)      Medications:   MEDICATIONS  (STANDING):  bisacodyl Suppository 10 milliGRAM(s) Rectal once  enoxaparin Injectable 40 milliGRAM(s) SubCutaneous every 24 hours  fentaNYL   Patch  12 MICROgram(s)/Hr 1 Patch Transdermal every 72 hours  influenza   Vaccine 0.5 milliLiter(s) IntraMuscular once  levoFLOXacin IVPB 500 milliGRAM(s) IV Intermittent every 24 hours  levoFLOXacin IVPB      sertraline 100 milliGRAM(s) Oral daily  sodium chloride 0.9% 1000 milliLiter(s) (60 mL/Hr) IV Continuous <Continuous>    MEDICATIONS  (PRN):  acetaminophen   Tablet. 650 milliGRAM(s) Oral every 8 hours PRN Mild Pain (1 - 3)  ALPRAZolam 0.5 milliGRAM(s) Oral two times a day PRN anxiety  docusate sodium 100 milliGRAM(s) Oral three times a day PRN Constipation  morphine  - Injectable 2 milliGRAM(s) IV Push every 6 hours PRN Severe Pain (7 - 10)  ondansetron   Disintegrating Tablet 4 milliGRAM(s) Oral every 6 hours PRN Nausea and/or Vomiting  oxyCODONE    5 mG/acetaminophen 325 mG 1 Tablet(s) Oral every 6 hours PRN Moderate Pain (4 - 6)  senna 2 Tablet(s) Oral at bedtime PRN Constipation    Review of Systems:  Constitutional: [ ] Fever [ ] Chills [ ] Fatigue [ ] Weight change   HEENT: [ ] Blurred vision [ ] Eye Pain [ ] Headache [ ] Runny nose [ ] Sore Throat   Respiratory: [ ] Cough [ ] Wheezing [ ] Shortness of breath  Cardiovascular: [ ] Chest Pain [ ] Palpitations [ ] BOWEN [ ] PND [ ] Orthopnea  Gastrointestinal: [ ] Abdominal Pain [ ] Diarrhea [ ] Constipation [ ] Hemorrhoids [ ] Nausea [ ] Vomiting  Genitourinary: [ ] Nocturia [ ] Dysuria [ ] Incontinence  Extremities: [ ] Swelling [ ] Joint Pain  Neurologic: [ ] Focal deficit [ ] Paresthesias [ ] Syncope  Lymphatic: [ ] Swelling [ ] Lymphadenopathy   Skin: [ ] Rash [ ] Ecchymoses [ ] Wounds [ ] Lesions  Psychiatry: [ ] Depression [ ] Suicidal/Homicidal Ideation [ ] Anxiety [ ] Sleep Disturbances  [ ] 10 point review of systems is otherwise negative except as mentioned above            [ ]Unable to obtain    T(C): 36.4 (10-19-17 @ 05:01), Max: 36.8 (10-18-17 @ 19:50)  HR: 88 (10-19-17 @ 05:01) (80 - 94)  BP: 128/81 (10-19-17 @ 05:01) (128/77 - 135/83)  RR: 16 (10-19-17 @ 05:01) (16 - 17)  SpO2: 92% (10-19-17 @ 05:01) (90% - 92%)  Wt(kg): --    10-18 @ 07:01  -  10-19 @ 07:00  --------------------------------------------------------  IN: 1625 mL / OUT: 0 mL / NET: 1625 mL      Daily     Daily     Physical Exam:  Appearance: [ ] Normal [ ] Abnormal [ ] NAD  Eyes: [ ] PERRL [ ] EOMI  HENT: [ ] Normal [ ] Abnormal oral muscosa [ ]NC/AT  Cardiovascular: [ ] S1 [ ] S2 [ ] RRR [ ] m/r/g [ ] edema [ ] JVP  Procedural Access Site: [ ] hematoma [ ] tenderness to palpation [ ] 2+ pulse [ ] bruit [ ] Ecchymosis  Respiratory: [ ] Clear to auscultation bilaterally  Gastrointestinal: [ ] Soft [ ] tenderness [ ] distention [ ] BS+  Musculoskeletal: [ ] clubbing [ ] joint deformity   Neurologic: [ ] Non-focal  Lymphatic: [ ] lymphadenopathy  Psychiatry: [ ] AAOx3 [ ] confused [ ] disoriented [ ] Mood & affect appropriate  Skin: [ ] rashes [ ] ecchymoses [ ] cyanosis    Cardiovascular Diagnostic Testing:    ECG:  < from: 12 Lead ECG (10.16.17 @ 21:17) >    Ventricular Rate 78 BPM    Atrial Rate 78 BPM    P-R Interval 116 ms    QRS Duration 76 ms    Q-T Interval 436 ms    QTC Calculation(Bezet) 497 ms    P Axis 55 degrees    R Axis 7 degrees    T Axis 41 degrees    Diagnosis Line Normal sinus rhythm  Nonspecific ST and T wave abnormality  Prolonged QT  Abnormal ECG  When compared with ECG of 14-AUG-2017 15:18,  T wave inversion more evident in Anterior leads  Confirmed by LIZ FERRARA ([id number]) on 10/17/2017 7:30:41 PM    < end of copied text >    Echo:    Stress Testing:    Cath:    Interpretation of Telemetry: Currently not on tele    Imaging:    Labs:                        10.8   11.8  )-----------( 312      ( 19 Oct 2017 06:35 )             35.5     10-19    138  |  100  |  5<L>  ----------------------------<  97  3.3<L>   |  26  |  0.43<L>    Ca    8.1<L>      19 Oct 2017 06:35  Mg     1.9     10-19    TPro  6.3  /  Alb  1.7<L>  /  TBili  1.0  /  DBili  .50<H>  /  AST  80<H>  /  ALT  21  /  AlkPhos  418<H>  10-19    Magnesium, Serum: 1.9 mg/dL (10-19 @ 10:11) Patient seen and evaluated @   Chief Complaint:     HPI:  70 yo female patient with pmh of colon cancer s/p resection, liver metastasis, anxiety, benign ovarian tumor, and HLD presenting with cc of nausea for 2 weeks and vomiting for the past 2 days. She started on her second course of chemotherapy on stivarga (started on 10/3 to end on 10/21). She went to see her oncologist today who told her to visit the ED for her symptoms and elevated WBC seen in the office. Vomiting is described as the the food that the patient eats and non bloody. Daughter states that patient gets abdominal discomfort at times that they attributed to dyspepsia since it was relieved by simethecone. Patient admits to abdominal pain located in the LUQ, stabbing in nature, non radiating rated as an 8/10 with no remitting or exacerbating factors. She is unsure if pain is related to food/bowel movements but states that she has decreased appetite and diarrhea. She admits to weakness and weight loss. Patient denies fever, sick contacts, recent travel, numbness, tingling, chest pain, sob, dizziness.  In the ED: BP 92/64 HR 91 RR 16 T 96.4 F SpO2 94% RA  WBC: 15.4  Neutrophils 89% Platelets 404 Lactate 3.9 Potassium 5.9 Alk Phos 151 Bili 0.2 Procal 0.41  Pro BNP 1623  CT chest/abdomen: Advanced emphysema. Multiple pulmonary parenchymal metastases. Hepatic metastases. Multistation metastatic adenopathy in the chest and abdomen. Small ascites. Small pericholecystic fluid nonspecific likely a secondary manifestation.   US GB: Layering sludge and/or stones within the lumen of the gallbladder wall with nonspecific gallbladder wall edema and/or thickening. consider HIDA  patient was given 2.8 L NS, 1 dose of levaquin (16 Oct 2017 21:13)      PMH:   Liver metastases  Liver cancer  Colon cancer  History of Osteoporosis  Depression  Hypertension  History of Oophorectomy  Anxiety  Hyperlipemia    PSH:   Ovary removal, prophylactic  History of colon resection      FAMILY HISTORY:  Family history of diabetes mellitus (Mother)    Social History:  Smoking:  Alcohol:  Drugs:    Allergies:  penicillin (Other)      Medications:   MEDICATIONS  (STANDING):  bisacodyl Suppository 10 milliGRAM(s) Rectal once  enoxaparin Injectable 40 milliGRAM(s) SubCutaneous every 24 hours  fentaNYL   Patch  12 MICROgram(s)/Hr 1 Patch Transdermal every 72 hours  influenza   Vaccine 0.5 milliLiter(s) IntraMuscular once  levoFLOXacin IVPB 500 milliGRAM(s) IV Intermittent every 24 hours  levoFLOXacin IVPB      sertraline 100 milliGRAM(s) Oral daily  sodium chloride 0.9% 1000 milliLiter(s) (60 mL/Hr) IV Continuous <Continuous>    MEDICATIONS  (PRN):  acetaminophen   Tablet. 650 milliGRAM(s) Oral every 8 hours PRN Mild Pain (1 - 3)  ALPRAZolam 0.5 milliGRAM(s) Oral two times a day PRN anxiety  docusate sodium 100 milliGRAM(s) Oral three times a day PRN Constipation  morphine  - Injectable 2 milliGRAM(s) IV Push every 6 hours PRN Severe Pain (7 - 10)  ondansetron   Disintegrating Tablet 4 milliGRAM(s) Oral every 6 hours PRN Nausea and/or Vomiting  oxyCODONE    5 mG/acetaminophen 325 mG 1 Tablet(s) Oral every 6 hours PRN Moderate Pain (4 - 6)  senna 2 Tablet(s) Oral at bedtime PRN Constipation    Review of Systems:  Constitutional: [ ] Fever [ ] Chills [ ] Fatigue [ ] Weight change   HEENT: [ ] Blurred vision [ ] Eye Pain [ ] Headache [ ] Runny nose [ ] Sore Throat   Respiratory: [ ] Cough [ ] Wheezing [ ] Shortness of breath  Cardiovascular: [ ] Chest Pain [ ] Palpitations [x ] BOWEN [ ] PND [ ] Orthopnea  Gastrointestinal: [x ] Abdominal Pain [ ] Diarrhea [ ] Constipation [ ] Hemorrhoids [ ] Nausea [ ] Vomiting  Genitourinary: [ ] Nocturia [ ] Dysuria [ ] Incontinence  Extremities: [ ] Swelling [ ] Joint Pain  Neurologic: [ ] Focal deficit [ ] Paresthesias [ ] Syncope  Lymphatic: [ ] Swelling [ ] Lymphadenopathy   Skin: [ ] Rash [ ] Ecchymoses [ ] Wounds [ ] Lesions  Psychiatry: [ ] Depression [ ] Suicidal/Homicidal Ideation [ ] Anxiety [ ] Sleep Disturbances  [ ] 10 point review of systems is otherwise negative except as mentioned above            [ ]Unable to obtain    T(C): 36.4 (10-19-17 @ 05:01), Max: 36.8 (10-18-17 @ 19:50)  HR: 88 (10-19-17 @ 05:01) (80 - 94)  BP: 128/81 (10-19-17 @ 05:01) (128/77 - 135/83)  RR: 16 (10-19-17 @ 05:01) (16 - 17)  SpO2: 92% (10-19-17 @ 05:01) (90% - 92%)  Wt(kg): --    10-18 @ 07:01  -  10-19 @ 07:00  --------------------------------------------------------  IN: 1625 mL / OUT: 0 mL / NET: 1625 mL      Daily     Daily     Physical Exam:  Appearance: [ ] Normal [ ] Abnormal [ x] NAD  Subdued  Eyes: [x ] PERRL [ ] EOMI  HENT: [x ] Normal [ ] Abnormal oral muscosa [ ]NC/AT  Cardiovascular: [x ] S1 [x ] S2 [xx ] RRR [x ] m/r/g  IV/VI  [ ] edema [ ] JVP  Procedural Access Site: [ ] hematoma [ ] tenderness to palpation [ ] 2+ pulse [ ] bruit [ ] Ecchymosis  Respiratory: [x ] Clear to auscultation bilaterally  Gastrointestinal: [x ] Soft [ ] tenderness [ ] distention [x ] BS+  Musculoskeletal: [ ] clubbing [ ] joint deformity   Neurologic: [x ] Non-focal  Lymphatic: [ ] lymphadenopathy  Psychiatry: [x ] AAOx3 [ ] confused [ ] disoriented [ ] Mood & affect appropriate  [x] Flat affect  Skin: [ ] rashes [ ] ecchymoses [ ] cyanosis    Cardiovascular Diagnostic Testing:    ECG:  < from: 12 Lead ECG (10.16.17 @ 21:17) >    Ventricular Rate 78 BPM    Atrial Rate 78 BPM    P-R Interval 116 ms    QRS Duration 76 ms    Q-T Interval 436 ms    QTC Calculation(Bezet) 497 ms    P Axis 55 degrees    R Axis 7 degrees    T Axis 41 degrees    Diagnosis Line Normal sinus rhythm  Nonspecific ST and T wave abnormality  Prolonged QT  Abnormal ECG  When compared with ECG of 14-AUG-2017 15:18,  T wave inversion more evident in Anterior leads  Confirmed by LIZ FERRARA ([id number]) on 10/17/2017 7:30:41 PM    < end of copied text >    Echo:    Stress Testing:    Cath:    Interpretation of Telemetry: Currently not on tele    Imaging:    Labs:                        10.8   11.8  )-----------( 312      ( 19 Oct 2017 06:35 )             35.5     10-19    138  |  100  |  5<L>  ----------------------------<  97  3.3<L>   |  26  |  0.43<L>    Ca    8.1<L>      19 Oct 2017 06:35  Mg     1.9     10-19    TPro  6.3  /  Alb  1.7<L>  /  TBili  1.0  /  DBili  .50<H>  /  AST  80<H>  /  ALT  21  /  AlkPhos  418<H>  10-19    Magnesium, Serum: 1.9 mg/dL (10-19 @ 10:11)

## 2017-10-19 NOTE — DIETITIAN INITIAL EVALUATION ADULT. - PROBLEM SELECTOR PLAN 3
s/p 3.8 L normal saline  continue IVF  lactate normalized  continue to monitor BP  fall precautions  improved after fluids : 116/74

## 2017-10-19 NOTE — CONSULT NOTE ADULT - ATTENDING COMMENTS
Agree with above with the following additions.  QTc seems to be prolonged around 490 on EKG from yesterday. Today is slightly shorter.  Please watch Qtc while on Levaquin and Zofran. I would try to avoid QTC prolonging medications. SSRIs may also prolong QTc  Check daily EKG.  Keep K >4, Mg >2

## 2017-10-19 NOTE — PROGRESS NOTE ADULT - PROBLEM SELECTOR PLAN 2
IV Zofran prn. HIDA scan negative. Most likely due to mets to liver. Pain control. Advance diet as tolerate. Noted surgical input. Trial of oral Zofran prior to meal and prn. HIDA scan negative. Most likely due to mets to liver. Pain control. Advance diet as tolerate. Noted surgical input.

## 2017-10-19 NOTE — PROGRESS NOTE ADULT - SUBJECTIVE AND OBJECTIVE BOX
[facility], Division of Infectious Diseases  REBEKA Gregory A. Lee  [phone]  Name: STEPHY POWELL  Age: 71y  Gender: Female  MRN: [id number]    Interval History--  Notes reviewed  Pt still with back pain, abd pain and nausea    Past Medical History--  Liver metastases  Liver cancer  Colon cancer  History of Osteoporosis  Depression  Hypertension  History of Oophorectomy  Anxiety  Hyperlipemia  Ovary removal, prophylactic  History of colon resection      For details regarding the patient's social history, family history, and other miscellaneous elements, please refer the initial infectious diseases consultation and/or the admitting history and physical examination for this admission.    Allergies    penicillin (Other)    Intolerances        Medications--  Antibiotics:  levoFLOXacin IVPB 500 milliGRAM(s) IV Intermittent every 24 hours  levoFLOXacin IVPB        Immunologic:  influenza   Vaccine 0.5 milliLiter(s) IntraMuscular once    Other:  acetaminophen   Tablet. PRN  ALPRAZolam PRN  docusate sodium PRN  enoxaparin Injectable  fentaNYL   Patch  12 MICROgram(s)/Hr  morphine  - Injectable PRN  ondansetron   Disintegrating Tablet PRN  oxyCODONE    5 mG/acetaminophen 325 mG PRN  senna PRN  sertraline  sodium chloride 0.9%      Review of Systems--  A 10-point review of systems was obtained.     Pertinent positives and negatives--  Constitutional: No fevers. No Chills. No Rigors.   Cardiovascular: No chest pain. No palpitations.  Respiratory: No shortness of breath. No cough.  Gastrointestinal: ++ nausea or vomiting. No diarrhea or constipation.   Psychiatric: some depression    Review of systems otherwise negative except as previously noted.    Physical Examination--  Vital Signs: T(F): 97.5 (10-19-17 @ 05:01), Max: 98.2 (10-18-17 @ 19:50)  HR: 88 (10-19-17 @ 05:01)  BP: 128/81 (10-19-17 @ 05:01)  RR: 16 (10-19-17 @ 05:01)  SpO2: 92% (10-19-17 @ 05:01)  Wt(kg): --  General: Nontoxic-appearing Female in no acute distress.  + dentures  Neck: Not rigid. No sense of mass.  Nodes: None palpable.  Lungs: Clear bilaterally without rales, wheezing or rhonchi  Heart: Regular rate and rhythm. No Murmur. No rub. No gallop. No palpable thrill.  Abdomen: Bowel sounds present and normoactive. Soft. Nondistended. tender no guarding  Extremities: No cyanosis or clubbing. No edema.   Skin: Warm. Dry. Good turgor. No rash. No vasculitic stigmata.  Psychiatric: Appropriate affect and mood for situation.         Laboratory Studies--  CBC                        10.8   11.8  )-----------( 312      ( 19 Oct 2017 06:35 )             35.5       Chemistries  10-19    138  |  100  |  5<L>  ----------------------------<  97  3.3<L>   |  26  |  0.43<L>    Ca    8.1<L>      19 Oct 2017 06:35  Mg     1.9     10-19    TPro  6.3  /  Alb  1.7<L>  /  TBili  1.0  /  DBili  .50<H>  /  AST  80<H>  /  ALT  21  /  AlkPhos  418<H>  10-19      Culture Data  Culture - Urine (10.16.17 @ 21:20)    -  Amikacin: S <=8    -  Ampicillin: R >16    -  Ampicillin/Sulbactam: I 16/8    -  Aztreonam: S <=4    -  Cefazolin: S <=2    -  Cefepime: S <=2    -  Cefoxitin: S <=4    -  Ceftazidime: S <=1    -  Ceftriaxone: S <=1    -  Ciprofloxacin: S <=0.5    -  Ertapenem: S <=0.5    -  Gentamicin: R >8    -  Imipenem: S <=1    -  Levofloxacin: S <=1    -  Meropenem: S <=1    -  Nitrofurantoin: S <=32    -  Piperacillin/Tazobactam: S <=8    -  Tobramycin: R >8    -  Trimethoprim/Sulfamethoxazole: R >2/38    Specimen Source: .Urine Clean Catch (Midstream)    Culture Results:   >100,000 CFU/ml Escherichia coli    Organism Identification: Escherichia coli    Organism: Escherichia coli    Method Type: LIAM

## 2017-10-19 NOTE — PROGRESS NOTE ADULT - SUBJECTIVE AND OBJECTIVE BOX
PCP: Dr. Tamez   [phone]    Notified Yes  [x ]        No [ ]   Oct. 17, 2017  Oncologist: Dr. Jacquie Cooper - Oncologist [phone]  Consultant: Dr. Barron    CC: Patient is a 71y old  Female who presents with a chief complaint of nausea, vomiting (16 Oct 2017 21:13)    HPI: 70 yo female patient with pmh of Colon cancer s/p resection @2011 , Liver metastasis s/p biopsy 04/15 Stivarga, Anxiety, Benign ovarian tumor s/p oophorectomy, HTN and HLD presenting with cc of nausea for 2 weeks and vomiting for the past 2 days. She started on her second course of chemotherapy on stivarga (started on 10/3 to end on 10/21). She went to see her oncologist today who told her to visit the ED for her symptoms and elevated WBC seen in the office. Vomiting is described as the the food that the patient eats and non bloody. Daughter states that patient gets abdominal discomfort at times that they attributed to dyspepsia since it was relieved by simethecone. Patient admits to abdominal pain located in the LUQ, stabbing in nature, non radiating rated as an 8/10 with no remitting or exacerbating factors. She is unsure if pain is related to food/bowel movements but states that she has decreased appetite and diarrhea. She admits to weakness and weight loss. Patient denies fever, sick contacts, recent travel, numbness, tingling, chest pain, sob, dizziness.  In the ED: BP 92/64 HR 91 RR 16 T 96.4 F SpO2 94% RA  WBC: 15.4  Neutrophils 89% Platelets 404 Lactate 3.9 Potassium 5.9 Alk Phos 151 Bili 0.2 Procal 0.41  Pro BNP 1623  CT chest/abdomen: Advanced emphysema. Multiple pulmonary parenchymal metastases. Hepatic metastases. Multistation metastatic adenopathy in the chest and abdomen. Small ascites. Small pericholecystic fluid nonspecific likely a secondary manifestation.   US GB: Layering sludge and/or stones within the lumen of the gallbladder wall with nonspecific gallbladder wall edema and/or thickening. consider HIDA  patient was given 2.8 L NS, 1 dose of levaquin.    10/17/17 Chart reviewed and events noted. Sitting in chair. No chest pain or shortness of breath but complaining midback pain radiating across. No nausea at present. No productive cough. No dysuria or hematuria.   10/18/17 Chart reviewed and test results and surgical consult noted. Met with  and daughter yesterday and discussed the care including findings on CT consistent of progression of disease. MD updated conversation with private oncologist and a copy of CT scan was provided to daughter who is the HCP. Patient continue to experience pain in epigastric and midback and going to have bone scan today. Intermittent nausea without vomiting. No chest pain or shortness of breath. Back pain is across. Morphine seems to help and used 3 times last 24 hours.  at bedside.   10/19/17 Chart reviewed and events noted.  Patient back pain is better control with Fentanyl patch.  She will be going for bone scan today.  at bedside. Patient was encouraged about eating with use of Zofran prior to meal and ambulation. No chest pain or shortness of breath. No bowel movement since Sunday but  added has not been eating.     REVIEW OF SYSTEMS: Rest of the review of systems is negative except noted in HPI.     T(C): 36.4 (10-19-17 @ 05:01), Max: 36.8 (10-18-17 @ 19:50)  HR: 88 (10-19-17 @ 05:01) (80 - 94)  BP: 128/81 (10-19-17 @ 05:01) (128/77 - 135/83)  RR: 16 (10-19-17 @ 05:01) (16 - 17)  SpO2: 92% (10-19-17 @ 05:01) (90% - 92%)  Wt(kg): --  I&O's Summary    18 Oct 2017 07:01  -  19 Oct 2017 07:00  --------------------------------------------------------  IN: 1625 mL / OUT: 0 mL / NET: 1625 mL    PHYSICAL EXAM:  GENERAL: NAD but ill looking.   HEAD:  Atraumatic, Normocephalic  EYES: EOMI, PERRLA, conjunctiva and sclera clear  ENMT: No tonsillar erythema, exudates, or enlargement; Moist mucous membranes. No lesions.  NECK: Supple, No JVD, Normal thyroid  NERVOUS SYSTEM:  Alert & Oriented X3, Motor Strength 5/5 B/L upper and lower extremities.  CHEST/LUNG: Clear to percussion bilaterally; No rales, rhonchi, wheezing, or rubs  HEART: Regular rate and rhythm; No murmurs, rubs, or gallops  ABDOMEN: Soft, Nontender, Nondistended; Bowel sounds present  EXTREMITIES:  1+ Peripheral Pulses, No clubbing, cyanosis, or edema  LYMPH: No lymphadenopathy noted  SKIN: No rashes or lesions.  BACK: No local spinal tenderness elicited.      LABS:               10.8   11.8  )-----------( 312      ( 19 Oct 2017 06:35 )             35.5     10-19    138  |  100  |  5<L>  ----------------------------<  97  3.3<L>   |  26  |  0.43<L>    Ca    8.1<L>      19 Oct 2017 06:35    TPro  6.3  /  Alb  1.7<L>  /  TBili  1.0  /  DBili  .50<H>  /  AST  80<H>  /  ALT  21  /  AlkPhos  418<H>  10-19    10-16 @ 21:20   >100,000 CFU/ml Escherichia coli  --  Escherichia coli  10-16 @ 18:24   No growth to date.  --  --    Active Medications  MEDICATIONS  (STANDING):  enoxaparin Injectable 40 milliGRAM(s) SubCutaneous every 24 hours  fentaNYL   Patch  12 MICROgram(s)/Hr 1 Patch Transdermal every 72 hours  influenza   Vaccine 0.5 milliLiter(s) IntraMuscular once  levoFLOXacin IVPB 500 milliGRAM(s) IV Intermittent every 24 hours  levoFLOXacin IVPB      potassium chloride    Tablet ER 40 milliEquivalent(s) Oral once  sertraline 100 milliGRAM(s) Oral daily  sodium chloride 0.9% 1000 milliLiter(s) (60 mL/Hr) IV Continuous <Continuous>    MEDICATIONS  (PRN):  acetaminophen   Tablet. 650 milliGRAM(s) Oral every 8 hours PRN Mild Pain (1 - 3)  ALPRAZolam 0.5 milliGRAM(s) Oral two times a day PRN anxiety  docusate sodium 100 milliGRAM(s) Oral three times a day PRN Constipation  morphine  - Injectable 2 milliGRAM(s) IV Push every 6 hours PRN Severe Pain (7 - 10)  ondansetron   Disintegrating Tablet 4 milliGRAM(s) Oral every 6 hours PRN Nausea and/or Vomiting  oxyCODONE    5 mG/acetaminophen 325 mG 1 Tablet(s) Oral every 6 hours PRN Moderate Pain (4 - 6)  senna 2 Tablet(s) Oral at bedtime PRN Constipation PCP: Dr. Tamez   [phone]    Notified Yes  [x ]        No [ ]   Oct. 17, 2017  Oncologist: Dr. Jacquie Cooper - Oncologist [phone]  Consultant: Dr. Barron, Dr. Hartman, Dr. Roberts    CC: Patient is a 71y old  Female who presents with a chief complaint of nausea, vomiting (16 Oct 2017 21:13)    HPI: 72 yo female patient with pmh of Colon cancer s/p resection @2011 , Liver metastasis s/p biopsy 04/15 Stivarga, Anxiety, Benign ovarian tumor s/p oophorectomy, HTN and HLD presenting with cc of nausea for 2 weeks and vomiting for the past 2 days. She started on her second course of chemotherapy on stivarga (started on 10/3 to end on 10/21). She went to see her oncologist today who told her to visit the ED for her symptoms and elevated WBC seen in the office. Vomiting is described as the the food that the patient eats and non bloody. Daughter states that patient gets abdominal discomfort at times that they attributed to dyspepsia since it was relieved by simethecone. Patient admits to abdominal pain located in the LUQ, stabbing in nature, non radiating rated as an 8/10 with no remitting or exacerbating factors. She is unsure if pain is related to food/bowel movements but states that she has decreased appetite and diarrhea. She admits to weakness and weight loss. Patient denies fever, sick contacts, recent travel, numbness, tingling, chest pain, sob, dizziness.  In the ED: BP 92/64 HR 91 RR 16 T 96.4 F SpO2 94% RA  WBC: 15.4  Neutrophils 89% Platelets 404 Lactate 3.9 Potassium 5.9 Alk Phos 151 Bili 0.2 Procal 0.41  Pro BNP 1623  CT chest/abdomen: Advanced emphysema. Multiple pulmonary parenchymal metastases. Hepatic metastases. Multistation metastatic adenopathy in the chest and abdomen. Small ascites. Small pericholecystic fluid nonspecific likely a secondary manifestation.   US GB: Layering sludge and/or stones within the lumen of the gallbladder wall with nonspecific gallbladder wall edema and/or thickening. consider HIDA  patient was given 2.8 L NS, 1 dose of levaquin.    10/17/17 Chart reviewed and events noted. Sitting in chair. No chest pain or shortness of breath but complaining midback pain radiating across. No nausea at present. No productive cough. No dysuria or hematuria.   10/18/17 Chart reviewed and test results and surgical consult noted. Met with  and daughter yesterday and discussed the care including findings on CT consistent of progression of disease. MD updated conversation with private oncologist and a copy of CT scan was provided to daughter who is the HCP. Patient continue to experience pain in epigastric and midback and going to have bone scan today. Intermittent nausea without vomiting. No chest pain or shortness of breath. Back pain is across. Morphine seems to help and used 3 times last 24 hours.  at bedside.   10/19/17 Chart reviewed and events noted.  Patient back pain is better control with Fentanyl patch.  She will be going for bone scan today.  at bedside. Patient was encouraged about eating with use of Zofran prior to meal and ambulation. No chest pain or shortness of breath. No bowel movement since Sunday but  added has not been eating.     REVIEW OF SYSTEMS: Rest of the review of systems is negative except noted in HPI.     T(C): 36.4 (10-19-17 @ 05:01), Max: 36.8 (10-18-17 @ 19:50)  HR: 88 (10-19-17 @ 05:01) (80 - 94)  BP: 128/81 (10-19-17 @ 05:01) (128/77 - 135/83)  RR: 16 (10-19-17 @ 05:01) (16 - 17)  SpO2: 92% (10-19-17 @ 05:01) (90% - 92%)  Wt(kg): --  I&O's Summary    18 Oct 2017 07:01  -  19 Oct 2017 07:00  --------------------------------------------------------  IN: 1625 mL / OUT: 0 mL / NET: 1625 mL    PHYSICAL EXAM:  GENERAL: NAD but ill looking.   HEAD:  Atraumatic, Normocephalic  EYES: EOMI, PERRLA, conjunctiva and sclera clear  ENMT: No tonsillar erythema, exudates, or enlargement; Moist mucous membranes. No lesions.  NECK: Supple, No JVD, Normal thyroid  NERVOUS SYSTEM:  Alert & Oriented X3, Motor Strength 5/5 B/L upper and lower extremities.  CHEST/LUNG: Clear to percussion bilaterally; No rales, rhonchi, wheezing, or rubs  HEART: Regular rate and rhythm; No murmurs, rubs, or gallops  ABDOMEN: Soft, Nontender, Nondistended; Bowel sounds present  EXTREMITIES:  1+ Peripheral Pulses, No clubbing, cyanosis, or edema  LYMPH: No lymphadenopathy noted  SKIN: No rashes or lesions.  BACK: No local spinal tenderness elicited.      LABS:               10.8   11.8  )-----------( 312      ( 19 Oct 2017 06:35 )             35.5     10-19    138  |  100  |  5<L>  ----------------------------<  97  3.3<L>   |  26  |  0.43<L>    Ca    8.1<L>      19 Oct 2017 06:35    TPro  6.3  /  Alb  1.7<L>  /  TBili  1.0  /  DBili  .50<H>  /  AST  80<H>  /  ALT  21  /  AlkPhos  418<H>  10-19    10-16 @ 21:20   >100,000 CFU/ml Escherichia coli  --  Escherichia coli  10-16 @ 18:24   No growth to date.  --  --    Active Medications  MEDICATIONS  (STANDING):  enoxaparin Injectable 40 milliGRAM(s) SubCutaneous every 24 hours  fentaNYL   Patch  12 MICROgram(s)/Hr 1 Patch Transdermal every 72 hours  influenza   Vaccine 0.5 milliLiter(s) IntraMuscular once  levoFLOXacin IVPB 500 milliGRAM(s) IV Intermittent every 24 hours  levoFLOXacin IVPB      potassium chloride    Tablet ER 40 milliEquivalent(s) Oral once  sertraline 100 milliGRAM(s) Oral daily  sodium chloride 0.9% 1000 milliLiter(s) (60 mL/Hr) IV Continuous <Continuous>    MEDICATIONS  (PRN):  acetaminophen   Tablet. 650 milliGRAM(s) Oral every 8 hours PRN Mild Pain (1 - 3)  ALPRAZolam 0.5 milliGRAM(s) Oral two times a day PRN anxiety  docusate sodium 100 milliGRAM(s) Oral three times a day PRN Constipation  morphine  - Injectable 2 milliGRAM(s) IV Push every 6 hours PRN Severe Pain (7 - 10)  ondansetron   Disintegrating Tablet 4 milliGRAM(s) Oral every 6 hours PRN Nausea and/or Vomiting  oxyCODONE    5 mG/acetaminophen 325 mG 1 Tablet(s) Oral every 6 hours PRN Moderate Pain (4 - 6)  senna 2 Tablet(s) Oral at bedtime PRN Constipation

## 2017-10-19 NOTE — PROGRESS NOTE ADULT - PROBLEM SELECTOR PLAN 10
lovenox subq qd for dvt ppx  fall precautions  aspiration precautions Replete Potassium orally and add to IVF. Check Magnesium level. On multiple medications. No further worsening noted on repeat EKG. Cardiology to advise.

## 2017-10-19 NOTE — DIETITIAN INITIAL EVALUATION ADULT. - PROBLEM SELECTOR PLAN 1
likely 2/2 to cholecystitis   elevated lactate, procal and WBC with U/S showing sign of potential gallbladder infection  repeat lactate 1.9  patient received 1 dose of levaquin and 3.85 L of NS  start IV cipro  f/u blood cx urine cx  f/u HIDA  Surgical consult  admit to medicine

## 2017-10-19 NOTE — DIETITIAN INITIAL EVALUATION ADULT. - PROBLEM SELECTOR PLAN 4
normalized on repeat BMP  no EKG changes(normal sinus rhythm)  continue to monitor  hypochloremia: likely 2/2 to vomiting  normlaized

## 2017-10-19 NOTE — PROGRESS NOTE ADULT - PROBLEM SELECTOR PLAN 9
Progression of the cancer. Has been treated with Stivarga chemotherapy as outpatient and off as per Dr. Cooper (oncologist) for current acute process. It seems patient has advance disease Replete orally and add to IVF. Check Magnesium level.

## 2017-10-19 NOTE — PROGRESS NOTE ADULT - PROBLEM SELECTOR PLAN 1
Possibly due to UTI cause by E.Coli.  as HIDA scan negative. IV Levaquin day #4 and  got 2 days IV Flagyl. Will stop IV Flagyl as HIDA scan negative. Follow culture and ID input.

## 2017-10-19 NOTE — PROGRESS NOTE ADULT - ATTENDING COMMENTS
11. Back Pain: Better with Morphine. Reviewed CT chest, abdomen and pelvis with radiologist. No obvious pathological lesion of spine. Could be referred pain. Follow up bone scan as recommended by radiologist to rule out mets to spine.     Diet: Clears and advance as tolerated.     RADIOLOGY & ADDITIONAL TESTS:    Imaging Personally Reviewed:  [ x] YES  [ ] NO    Consultant(s) Notes Reviewed:  [ ] YES  [x ] NO      DVT Prophylaxis:  Subcu Heparin [  ]     LMWH [ x]     Coumadin [ ]    Xaeralto [ ]    Eliquis [ ]   Venodyne pumps [ ]    Discussed with Patient [x ]     Family [ ]          [ ]   RN[ x]      [x ]    Advance Directives: Full code. Needs to be addressed. Met with  and daughter yesterday and today  at bedside. Advance disease with guarded/poor prognosis. Goal is to control pain and switch to oral medications for safe disposition.     Palliative Care: Palliative care consult.     Care Discussed with Consultants/Other Providers [x ] YES  [ ] NO   Dr. Barron and Dr. Dhillon.  PCP office contacted. 11. Back Pain: Better with Fentanyl patch. Continue Morphine or Oxycodone as needed with plan to switch over to oral agents with Fentanyl patch. Reviewed CT chest, abdomen and pelvis with radiologist. No obvious pathological lesion of spine. Could be referred pain. Follow up bone scan as recommended by radiologist to rule out mets to spine.     12. Constipation: Stool softners ordered. Monitor.     Diet: Clears and advance as tolerated.     RADIOLOGY & ADDITIONAL TESTS:    Imaging Personally Reviewed:  [ x] YES  [ ] NO    Consultant(s) Notes Reviewed:  [ ] YES  [x ] NO      DVT Prophylaxis:  Subcu Heparin [  ]     LMWH [ x]     Coumadin [ ]    Xaeralto [ ]    Eliquis [ ]   Venodyne pumps [ ]    Discussed with Patient [x ]     Family [ ]          [ ]   RN[ x]      [x ]    Advance Directives: Full code. Needs to be addressed. Met with  and daughter again yesterday and today  at bedside. Advance disease with guarded/poor prognosis. Goal is to control pain and switch to oral medications for safe disposition. PT evaluation for discharge planning. Encourage patient and  about ambulation and care plan was discussed with RN.    Palliative Care: Palliative care consult.     Care Discussed with Consultants/Other Providers [x ] YES  [ ] NO   Dr. Roberts and Dr. Hartman.  PCP office has been contacted.

## 2017-10-19 NOTE — CONSULT NOTE ADULT - ASSESSMENT
This is a 70 y/o F with HLD, anxiety, and colon Ca s/p resection with liver mets on chemo presented to the ED c/o nausea x 2 weeks and vomiting x 2 days PTA.  Called for consult due to prolonged QT.    - EKG reviewed; NSR, rate 62, normal axis and good R wave progression; non-specific STD in leads I and II, no TWI and SCOTTY.  Baseline QTC =   - No evidence of ischemia or arrhythmia on the EKG  - Patient is currently on Levaquin and Zofran which are known to prolong QT interval  - This is a 70 y/o F with HLD, anxiety, and colon Ca s/p resection with liver mets on chemo presented to the ED c/o nausea x 2 weeks and vomiting x 2 days PTA.  Called for consult due to prolonged QT.    - EKG reviewed; NSR, rate 62, normal axis and good R wave progression; non-specific STD in leads I and II, no TWI and SCOTTY.  Baseline QT (08/17) = 440; today's = 400.  No obvious prolongation from baseline  - No evidence of ischemia or arrhythmia on the EKG  - Patient is currently on Levaquin and Zofran which are known to prolong QT interval  - Will monitor while on both Levaquin and Zofran for now; daily EKG  - Place on telemetry for at least 48 hrs  - Monitor electrolytes; replete to keep K>4 and MAg>2  - Further cardiac workup as case unfolds  - Will continue to follow    Shanda Greer NP  Cardiology This is a 72 y/o F with HLD, anxiety, and colon Ca s/p resection with liver mets on chemo presented to the ED c/o nausea x 2 weeks and vomiting x 2 days PTA.  Called for consult due to prolonged QT.    - EKG reviewed; NSR, rate 62, normal axis and good R wave progression; non-specific STD in leads I and II, no TWI and SCOTTY.  Baseline QT (08/17) = 440  - No evidence of ischemia or arrhythmia on the EKG  - Patient is currently on Levaquin and Zofran which are known to prolong QT interval  - Will monitor while on both Levaquin and Zofran for now; daily EKG  - Place on telemetry for at least 48 hrs  - Monitor electrolytes; replete to keep K>4 and MAg>2  - Further cardiac workup as case unfolds  - Will continue to follow    Shanda Greer NP  Cardiology

## 2017-10-19 NOTE — PROGRESS NOTE ADULT - ASSESSMENT
72 yo female patient with pmh of Colon cancer s/p resection @2011 , Liver metastasis s/p biopsy 04/15 Stivarga, Anxiety, Benign ovarian tumor s/p oophorectomy, HTN and HLD presenting with cc of nausea for 2 weeks and vomiting for the past 2 days. She started on her second course of chemotherapy on stivarga (started on 10/3 to end on 10/21). She went to see her oncologist today who told her to visit the ED for her symptoms and elevated WBC seen in the office. Vomiting is described as the food that the patient eats and non bloody and admitted for sepsis due to suspected acute cholecystitis and midback pain. HIDA scan negative and urine culture growing E.Coli. Scheduled for bone scan today.

## 2017-10-19 NOTE — PROGRESS NOTE ADULT - ASSESSMENT
71F with colon cancer chemo with stirvaga last dose 3 days ago  admitted with nausea, vomiting, diarrhea, abd pain and leukocytosis  ct scan without any acute path noted  urine cx with ecoli  blood cx neg  and leukocytosis has now resolved  she has no symptoms of uti but urinalysis with blood and some wbc  urine cx with e coli sensitive to levofloxacain

## 2017-10-20 ENCOUNTER — TRANSCRIPTION ENCOUNTER (OUTPATIENT)
Age: 72
End: 2017-10-20

## 2017-10-20 DIAGNOSIS — F43.23 ADJUSTMENT DISORDER WITH MIXED ANXIETY AND DEPRESSED MOOD: ICD-10-CM

## 2017-10-20 LAB
ALBUMIN SERPL ELPH-MCNC: 1.6 G/DL — LOW (ref 3.3–5)
ALP SERPL-CCNC: 400 U/L — HIGH (ref 40–120)
ALT FLD-CCNC: 17 U/L — SIGNIFICANT CHANGE UP (ref 12–78)
ANION GAP SERPL CALC-SCNC: 10 MMOL/L — SIGNIFICANT CHANGE UP (ref 5–17)
AST SERPL-CCNC: 82 U/L — HIGH (ref 15–37)
BILIRUB DIRECT SERPL-MCNC: 0.4 MG/DL — HIGH (ref 0.05–0.2)
BILIRUB INDIRECT FLD-MCNC: 1 MG/DL — SIGNIFICANT CHANGE UP (ref 0.2–1)
BILIRUB SERPL-MCNC: 1.4 MG/DL — HIGH (ref 0.2–1.2)
BUN SERPL-MCNC: 6 MG/DL — LOW (ref 7–23)
CALCIUM SERPL-MCNC: 8 MG/DL — LOW (ref 8.5–10.1)
CHLORIDE SERPL-SCNC: 100 MMOL/L — SIGNIFICANT CHANGE UP (ref 96–108)
CK SERPL-CCNC: 273 U/L — HIGH (ref 26–192)
CO2 SERPL-SCNC: 26 MMOL/L — SIGNIFICANT CHANGE UP (ref 22–31)
CREAT SERPL-MCNC: 0.37 MG/DL — LOW (ref 0.5–1.3)
GLUCOSE SERPL-MCNC: 95 MG/DL — SIGNIFICANT CHANGE UP (ref 70–99)
HCT VFR BLD CALC: 33.9 % — LOW (ref 34.5–45)
HGB BLD-MCNC: 10.5 G/DL — LOW (ref 11.5–15.5)
MAGNESIUM SERPL-MCNC: 1.8 MG/DL — SIGNIFICANT CHANGE UP (ref 1.6–2.6)
MCHC RBC-ENTMCNC: 27.7 PG — SIGNIFICANT CHANGE UP (ref 27–34)
MCHC RBC-ENTMCNC: 31 GM/DL — LOW (ref 32–36)
MCV RBC AUTO: 89.3 FL — SIGNIFICANT CHANGE UP (ref 80–100)
NT-PROBNP SERPL-SCNC: 1923 PG/ML — HIGH (ref 0–125)
PLATELET # BLD AUTO: 315 K/UL — SIGNIFICANT CHANGE UP (ref 150–400)
POTASSIUM SERPL-MCNC: 4.6 MMOL/L — SIGNIFICANT CHANGE UP (ref 3.5–5.3)
POTASSIUM SERPL-SCNC: 4.6 MMOL/L — SIGNIFICANT CHANGE UP (ref 3.5–5.3)
PROT SERPL-MCNC: 6.2 G/DL — SIGNIFICANT CHANGE UP (ref 6–8.3)
RBC # BLD: 3.79 M/UL — LOW (ref 3.8–5.2)
RBC # FLD: 16.7 % — HIGH (ref 10.3–14.5)
SODIUM SERPL-SCNC: 136 MMOL/L — SIGNIFICANT CHANGE UP (ref 135–145)
TROPONIN I SERPL-MCNC: <.015 NG/ML — SIGNIFICANT CHANGE UP (ref 0.01–0.04)
WBC # BLD: 11.9 K/UL — HIGH (ref 3.8–10.5)
WBC # FLD AUTO: 11.9 K/UL — HIGH (ref 3.8–10.5)

## 2017-10-20 PROCEDURE — 93010 ELECTROCARDIOGRAM REPORT: CPT

## 2017-10-20 PROCEDURE — 99233 SBSQ HOSP IP/OBS HIGH 50: CPT

## 2017-10-20 PROCEDURE — 99497 ADVNCD CARE PLAN 30 MIN: CPT | Mod: 25

## 2017-10-20 RX ORDER — MIRTAZAPINE 45 MG/1
7.5 TABLET, ORALLY DISINTEGRATING ORAL AT BEDTIME
Qty: 0 | Refills: 0 | Status: DISCONTINUED | OUTPATIENT
Start: 2017-10-20 | End: 2017-10-23

## 2017-10-20 RX ORDER — FENTANYL CITRATE 50 UG/ML
1 INJECTION INTRAVENOUS
Qty: 0 | Refills: 0 | Status: DISCONTINUED | OUTPATIENT
Start: 2017-10-20 | End: 2017-10-21

## 2017-10-20 RX ORDER — MAGNESIUM OXIDE 400 MG ORAL TABLET 241.3 MG
400 TABLET ORAL ONCE
Qty: 0 | Refills: 0 | Status: COMPLETED | OUTPATIENT
Start: 2017-10-20 | End: 2017-10-20

## 2017-10-20 RX ADMIN — MORPHINE SULFATE 2 MILLIGRAM(S): 50 CAPSULE, EXTENDED RELEASE ORAL at 06:55

## 2017-10-20 RX ADMIN — ONDANSETRON 4 MILLIGRAM(S): 8 TABLET, FILM COATED ORAL at 10:34

## 2017-10-20 RX ADMIN — SERTRALINE 100 MILLIGRAM(S): 25 TABLET, FILM COATED ORAL at 12:03

## 2017-10-20 RX ADMIN — FENTANYL CITRATE 1 PATCH: 50 INJECTION INTRAVENOUS at 12:00

## 2017-10-20 RX ADMIN — MAGNESIUM OXIDE 400 MG ORAL TABLET 400 MILLIGRAM(S): 241.3 TABLET ORAL at 14:31

## 2017-10-20 RX ADMIN — MIRTAZAPINE 7.5 MILLIGRAM(S): 45 TABLET, ORALLY DISINTEGRATING ORAL at 22:28

## 2017-10-20 RX ADMIN — Medication 0.5 MILLIGRAM(S): at 14:31

## 2017-10-20 RX ADMIN — ENOXAPARIN SODIUM 40 MILLIGRAM(S): 100 INJECTION SUBCUTANEOUS at 12:03

## 2017-10-20 NOTE — DISCHARGE NOTE ADULT - CARE PROVIDER_API CALL
Joe Tamez), Internal Medicine  [address] [place] [address]  Phone: [phone]  Fax: [phone]    Dr. Cooper [facility]  Oncologist  Phone: [phone]  Fax: (   )    -

## 2017-10-20 NOTE — PHYSICAL THERAPY INITIAL EVALUATION ADULT - LEVEL OF INDEPENDENCE: SUPINE/SIT, REHAB EVAL
moderate assist (50% patients effort)/pt c/o nausea and back pain, pt wanting to lay back down. Pt encourged to sit up in recliner but declined due to symptoms

## 2017-10-20 NOTE — PHYSICAL THERAPY INITIAL EVALUATION ADULT - ADDITIONAL COMMENTS
History obtained from spouse at bedside. Pt lives with her spouse in a house, daughter lives upstairs. Daughter works during daytime but pt is given care between spouse and daughter. Pt has 3 steps to enter /c 1 handrail and none to negotiate inside. Pt has 1 flight to basement but will not be using. Pt was independent without device but in the last 1 month has had a decline in overall function. Pt has been on chemotherapy for ~ 2 years but now on a chemo oral agent as per spouse. Pt does get assisted for showers. Pt can groom and dress herself. Spouse states pt was active and mobile 2 months ago. Pt has a rolling walker, wheelchair, shower chair and grab bars. pt does not drive.

## 2017-10-20 NOTE — DISCHARGE NOTE ADULT - PROVIDER TOKENS
TOKEN:'[id number]',FREE:[LAST:[Dr. Cooper],FIRST:[Mosce],PHONE:[phone]],FAX:[(   [phone]    -],ADDRESS:[Oncologist]]

## 2017-10-20 NOTE — PHYSICAL THERAPY INITIAL EVALUATION ADULT - LEVEL OF INDEPENDENCE: STAIR NEGOTIATION, REHAB EVAL
pt c/o nausea and back pain, pt wanting to lay back down. Pt encourged to sit up in recliner but declined due to symptoms/unable to perform

## 2017-10-20 NOTE — DISCHARGE NOTE ADULT - CARE PLAN
Principal Discharge DX:	Sepsis due to urinary tract infection  Goal:	Resolution.  Instructions for follow-up, activity and diet:	Levaquin for total 7 days.  Secondary Diagnosis:	Metastatic colon cancer in female  Instructions for follow-up, activity and diet:	Follow up with oncologist.  Secondary Diagnosis:	Prolonged QT interval  Instructions for follow-up, activity and diet:	Monitor out patient. Consider using another agents if worsening for nausea and depression.  Secondary Diagnosis:	Chronic midline thoracic back pain  Instructions for follow-up, activity and diet:	Fentanyl patch with oxycodone for break through pain. Consider dedicated CT scan or MRI of spine out patient.  Secondary Diagnosis:	Nausea and vomiting, intractability of vomiting not specified, unspecified vomiting type  Instructions for follow-up, activity and diet:	Antinausea medication.  Secondary Diagnosis:	Anxiety and depression  Instructions for follow-up, activity and diet:	Medications and close follow up with Dr. Tamez.

## 2017-10-20 NOTE — PHYSICAL THERAPY INITIAL EVALUATION ADULT - SIT-TO-STAND BALANCE
pt c/o nausea and back pain, pt wanting to lay back down. Pt encourged to sit up in recliner but declined due to symptoms

## 2017-10-20 NOTE — PHYSICAL THERAPY INITIAL EVALUATION ADULT - DID THE PATIENT HAVE SURGERY?
NM Hepatobiliary: (-) acute cholecystitis, large photopenic area in the right hepatic lobe. NM Bone: Focal increased uptake in the lower sternum and T11-T12./n/a

## 2017-10-20 NOTE — PROGRESS NOTE ADULT - PROBLEM SELECTOR PLAN 2
Trial of oral Zofran prior to meal and prn. HIDA scan negative. Most likely due to mets to liver. Pain control. Advance diet as tolerate. Noted surgical input.

## 2017-10-20 NOTE — DISCHARGE NOTE ADULT - PATIENT PORTAL LINK FT
“You can access the [facility] Patient Portal, offered by [facility], by registering with the following website: [email or web address]”

## 2017-10-20 NOTE — PROGRESS NOTE ADULT - ATTENDING COMMENTS
11. Back Pain: Will increase dose of Fentanyl patch to 25mcg q72 hours for better control. Continue Morphine or Oxycodone as needed with plan to switch over to oral agents with Fentanyl patch. Reviewed CT chest, abdomen and pelvis with radiologist. No obvious pathological lesion of spine. Could be referred pain. Noted bone scan as recommended by radiologist nonspecific findings of T11-12.     12. Constipation: Stool softners ordered. Monitor.     Diet: Clears and advance as tolerated.     RADIOLOGY & ADDITIONAL TESTS:    Imaging Personally Reviewed:  [ x] YES  [ ] NO    Consultant(s) Notes Reviewed:  [ ] YES  [x ] NO      DVT Prophylaxis:  Subcu Heparin [  ]     LMWH [ x]     Coumadin [ ]    Xaeralto [ ]    Eliquis [ ]   Venodyne pumps [ ]    Discussed with Patient [x ]     Family [ ]          [ ]   RN[ x]      [x ]    Advance Directives: Full code. Needs to be addressed. Met with  at bedside. Advance disease with guarded/poor prognosis. Goal is to control pain and switch to oral medications for safe disposition. PT evaluation for discharge planning. Encourage patient and  about ambulation and care plan was discussed with RN. If able to ambulate safely and tolerating oral diet then discharge later today as discussed with  and out patient follow up including further imaging study for back if needed.     Palliative Care: Palliative care consult.     Care Discussed with Consultants/Other Providers [x ] YES  [ ] NO   Dr. Roberts and Dr. Hartman.  PCP office has been contacted.

## 2017-10-20 NOTE — PHYSICAL THERAPY INITIAL EVALUATION ADULT - LEVEL OF INDEPENDENCE: GAIT, REHAB EVAL
unable to perform/pt c/o nausea and back pain, pt wanting to lay back down. Pt encourged to sit up in recliner but declined due to symptoms

## 2017-10-20 NOTE — PHYSICAL THERAPY INITIAL EVALUATION ADULT - IMPAIRMENTS FOUND, PT EVAL
joint integrity and mobility/gait, locomotion, and balance/gross motor/posture/muscle strength/poor safety awareness/aerobic capacity/endurance/arousal, attention, and cognition/cognitive impairment

## 2017-10-20 NOTE — PHYSICAL THERAPY INITIAL EVALUATION ADULT - DISCHARGE DISPOSITION, PT EVAL
home w/ assist/HCPT /c increased assistance, PT discussed possible rehab spouse declined/home w/ home PT

## 2017-10-20 NOTE — PROGRESS NOTE ADULT - SUBJECTIVE AND OBJECTIVE BOX
PCP: Dr. Tamez   [phone]    Notified Yes  [x ]        No [ ]   Oct. 17, 2017  Oncologist: Dr. Jacquie Cooper - Oncologist [phone]  Consultant: Dr. Barron, Dr. Hartman, Dr. Roberts    CC: Patient is a 71y old  Female who presents with a chief complaint of nausea, vomiting (16 Oct 2017 21:13)    HPI: 72 yo female patient with pmh of Colon cancer s/p resection @2011 , Liver metastasis s/p biopsy 04/15 Stivarga, Anxiety, Benign ovarian tumor s/p oophorectomy, HTN and HLD presenting with cc of nausea for 2 weeks and vomiting for the past 2 days. She started on her second course of chemotherapy on stivarga (started on 10/3 to end on 10/21). She went to see her oncologist today who told her to visit the ED for her symptoms and elevated WBC seen in the office. Vomiting is described as the the food that the patient eats and non bloody. Daughter states that patient gets abdominal discomfort at times that they attributed to dyspepsia since it was relieved by simethecone. Patient admits to abdominal pain located in the LUQ, stabbing in nature, non radiating rated as an 8/10 with no remitting or exacerbating factors. She is unsure if pain is related to food/bowel movements but states that she has decreased appetite and diarrhea. She admits to weakness and weight loss. Patient denies fever, sick contacts, recent travel, numbness, tingling, chest pain, sob, dizziness.  In the ED: BP 92/64 HR 91 RR 16 T 96.4 F SpO2 94% RA  WBC: 15.4  Neutrophils 89% Platelets 404 Lactate 3.9 Potassium 5.9 Alk Phos 151 Bili 0.2 Procal 0.41  Pro BNP 1623  CT chest/abdomen: Advanced emphysema. Multiple pulmonary parenchymal metastases. Hepatic metastases. Multistation metastatic adenopathy in the chest and abdomen. Small ascites. Small pericholecystic fluid nonspecific likely a secondary manifestation.   US GB: Layering sludge and/or stones within the lumen of the gallbladder wall with nonspecific gallbladder wall edema and/or thickening. consider HIDA  patient was given 2.8 L NS, 1 dose of levaquin.    10/17/17 Chart reviewed and events noted. Sitting in chair. No chest pain or shortness of breath but complaining midback pain radiating across. No nausea at present. No productive cough. No dysuria or hematuria.   10/18/17 Chart reviewed and test results and surgical consult noted. Met with  and daughter yesterday and discussed the care including findings on CT consistent of progression of disease. MD updated conversation with private oncologist and a copy of CT scan was provided to daughter who is the HCP. Patient continue to experience pain in epigastric and midback and going to have bone scan today. Intermittent nausea without vomiting. No chest pain or shortness of breath. Back pain is across. Morphine seems to help and used 3 times last 24 hours.  at bedside.   10/19/17 Chart reviewed and events noted.  Patient back pain is better control with Fentanyl patch.  She will be going for bone scan today.  at bedside. Patient was encouraged about eating with use of Zofran prior to meal and ambulation. No chest pain or shortness of breath. No bowel movement since Sunday but  added has not been eating.   10/20/17 Chart reviewed and bone scan results noted.  at bedside. Had a better night last night. Requesting stronger patch for pain control as still requesting IV Morphine for pain control. No obvious vomiting, fever, bleeding or diarrhea. Back pain is better controlled.     REVIEW OF SYSTEMS: Rest of the review of systems is negative except noted in HPI.     T(C): 36.8 (10-20-17 @ 06:09), Max: 36.8 (10-20-17 @ 06:09)  HR: 85 (10-20-17 @ 06:09) (85 - 101)  BP: 138/81 (10-20-17 @ 06:09) (110/70 - 138/81)  RR: 18 (10-20-17 @ 06:09) (17 - 18)  SpO2: 91% (10-20-17 @ 06:09) (90% - 91%)  Wt(kg): --  I&O's Summary    19 Oct 2017 07:01  -  20 Oct 2017 07:00  --------------------------------------------------------  IN: 360 mL / OUT: 200 mL / NET: 160 mL    PHYSICAL EXAM:  GENERAL: NAD but ill looking.   HEAD:  Atraumatic, Normocephalic  EYES: EOMI, PERRLA, conjunctiva and sclera clear  ENMT: No tonsillar erythema, exudates, or enlargement; Moist mucous membranes. No lesions.  NECK: Supple, No JVD, Normal thyroid  NERVOUS SYSTEM:  Alert & Oriented X3, Motor Strength 5/5 B/L upper and lower extremities.  CHEST/LUNG: Clear to percussion bilaterally; No rales, rhonchi, wheezing, or rubs  HEART: Regular rate and rhythm; No murmurs, rubs, or gallops  ABDOMEN: Soft, Nontender, Nondistended; Bowel sounds present  EXTREMITIES:  1+ Peripheral Pulses, No clubbing, cyanosis, or edema  LYMPH: No lymphadenopathy noted  SKIN: No rashes or lesions.  BACK: No local spinal tenderness elicited.        T(C): 36.8 (10-20-17 @ 06:09), Max: 36.8 (10-20-17 @ 06:09)  HR: 85 (10-20-17 @ 06:09) (85 - 101)  BP: 138/81 (10-20-17 @ 06:09) (110/70 - 138/81)  RR: 18 (10-20-17 @ 06:09) (17 - 18)  SpO2: 91% (10-20-17 @ 06:09) (90% - 91%)  Wt(kg): --  I&O's Summary    19 Oct 2017 07:01  -  20 Oct 2017 07:00  --------------------------------------------------------  IN: 360 mL / OUT: 200 mL / NET: 160 mL            LABS:                        10.5   11.9  )-----------( 315      ( 20 Oct 2017 09:19 )             33.9     10-19    138  |  100  |  5<L>  ----------------------------<  97  3.3<L>   |  26  |  0.43<L>    Ca    8.1<L>      19 Oct 2017 06:35  Mg     1.9     10-19    TPro  6.3  /  Alb  1.7<L>  /  TBili  1.0  /  DBili  .50<H>  /  AST  80<H>  /  ALT  21  /  AlkPhos  418<H>  10-19    10-16 @ 21:20   Urine >100,000 CFU/ml Escherichia coli  --  Escherichia coli  10-16 @ 18:24   Blood No growth to date.  --  --    Active Medications  MEDICATIONS  (STANDING):  enoxaparin Injectable 40 milliGRAM(s) SubCutaneous every 24 hours  fentaNYL   Patch  25 MICROgram(s)/Hr 1 Patch Transdermal every 72 hours  influenza   Vaccine 0.5 milliLiter(s) IntraMuscular once  levoFLOXacin IVPB 500 milliGRAM(s) IV Intermittent every 24 hours  levoFLOXacin IVPB      sertraline 100 milliGRAM(s) Oral daily    MEDICATIONS  (PRN):  acetaminophen   Tablet. 650 milliGRAM(s) Oral every 8 hours PRN Mild Pain (1 - 3)  ALPRAZolam 0.5 milliGRAM(s) Oral two times a day PRN anxiety  docusate sodium 100 milliGRAM(s) Oral three times a day PRN Constipation  morphine  - Injectable 2 milliGRAM(s) IV Push every 6 hours PRN Severe Pain (7 - 10)  ondansetron   Disintegrating Tablet 4 milliGRAM(s) Oral every 6 hours PRN Nausea and/or Vomiting  oxyCODONE    5 mG/acetaminophen 325 mG 1 Tablet(s) Oral every 6 hours PRN Moderate Pain (4 - 6)  senna 2 Tablet(s) Oral at bedtime PRN Constipation

## 2017-10-20 NOTE — PROGRESS NOTE ADULT - PROBLEM SELECTOR PLAN 10
Follow up EKG after Repletion of Potassium orally and add to IVF. Noted Magnesium level. On multiple medications. No further worsening noted on repeat EKG. Cardiology to advise.

## 2017-10-20 NOTE — PROGRESS NOTE ADULT - ATTENDING COMMENTS
Thank you for the courtesy of this referral.  D/W nursing  D/W  @ bedside    I'll sign off at this time.     Rashid Caruso MD  [phone]

## 2017-10-20 NOTE — BEHAVIORAL HEALTH ASSESSMENT NOTE - EYE CONTACT
Called  and spoke with Jens Cruz she said delivery can not be set up patient needs to pay her premium for March. I called patient she said it should be done she will call the insurance and then call [employer].  She will call me back after she talks to both [employer]

## 2017-10-20 NOTE — PHYSICAL THERAPY INITIAL EVALUATION ADULT - ANKLE STRATEGY ASSESSMENT, REHAB EVAL
pt c/o nausea and back pain, pt wanting to lay back down. Pt encouraged to sit up in recliner but declined due to symptoms

## 2017-10-20 NOTE — DISCHARGE NOTE ADULT - PLAN OF CARE
Resolution. Levaquin for total 7 days. Follow up with oncologist. Monitor out patient. Consider using another agents if worsening for nausea and depression. Fentanyl patch with oxycodone for break through pain. Consider dedicated CT scan or MRI of spine out patient. Antinausea medication. Medications and close follow up with Dr. Tamez.

## 2017-10-20 NOTE — DISCHARGE NOTE ADULT - HOSPITAL COURSE
70 yo female patient with pmh of Colon cancer s/p resection @2011 , Liver metastasis s/p biopsy 04/15 Stivarga, Anxiety, Benign ovarian tumor s/p oophorectomy, HTN and HLD presenting with cc of nausea for 2 weeks and vomiting for the past 2 days. She started on her second course of chemotherapy on stivarga (started on 10/3 to end on 10/21). She went to see her oncologist today who told her to visit the ED for her symptoms and elevated WBC seen in the office. Vomiting is described as the the food that the patient eats and non bloody. Daughter states that patient gets abdominal discomfort at times that they attributed to dyspepsia since it was relieved by simethecone. Patient admits to abdominal pain located in the LUQ, stabbing in nature, non radiating rated as an 8/10 with no remitting or exacerbating factors. She is unsure if pain is related to food/bowel movements but states that she has decreased appetite and diarrhea. She admits to weakness and weight loss. Patient denies fever, sick contacts, recent travel, numbness, tingling, chest pain, sob, dizziness.  In the ED: BP 92/64 HR 91 RR 16 T 96.4 F SpO2 94% RA  WBC: 15.4  Neutrophils 89% Platelets 404 Lactate 3.9 Potassium 5.9 Alk Phos 151 Bili 0.2 Procal 0.41  Pro BNP 1623  CT chest/abdomen: Advanced emphysema. Multiple pulmonary parenchymal metastases. Hepatic metastases. Multistation metastatic adenopathy in the chest and abdomen. Small ascites. Small pericholecystic fluid nonspecific likely a secondary manifestation.   US GB: Layering sludge and/or stones within the lumen of the gallbladder wall with nonspecific gallbladder wall edema and/or thickening. consider HIDA  patient was given 2.8 L NS, 1 dose of levaquin.  Patient had surgical consult and HIDA scan. HIDA scan negative and surgery recommended supportive care. Found to E Coli and treated with Levaquin. Mild QT prolongation remain stable. Remote telemonitoring. Patient was started on Fentanyl patch and increased to 25 mcg q72 hours today. Bone scan negative for definite mets but nonspecific T11-12 findings. Daughter and oncologist do not want CT or MRI of spine at this time because of chronic nature of back pain. Plan is to take her home with close out patient follow up. Daughter is not interested in DNR/DNI or hospice care at this time after 3 meetings. Home care services referral. Total of 7 days of Levaquin. Remeron added today by Dr. Vasquez to help with depression, anxiety and appetite. If patient remains stable then possible discharge tomorrow as discussed with all consultants and daughter. PCP was contacted and updated as well. 72 yo female patient with pmh of Colon cancer s/p resection @2011 , Liver metastasis s/p biopsy 04/15 Stivarga, Anxiety, Benign ovarian tumor s/p oophorectomy, HTN and HLD presenting with cc of nausea for 2 weeks and vomiting for the past 2 days. She started on her second course of chemotherapy on stivarga (started on 10/3 to end on 10/21). She went to see her oncologist today who told her to visit the ED for her symptoms and elevated WBC seen in the office. Vomiting is described as the the food that the patient eats and non bloody. Daughter states that patient gets abdominal discomfort at times that they attributed to dyspepsia since it was relieved by simethecone. Patient admits to abdominal pain located in the LUQ, stabbing in nature, non radiating rated as an 8/10 with no remitting or exacerbating factors. She is unsure if pain is related to food/bowel movements but states that she has decreased appetite and diarrhea. She admits to weakness and weight loss. Patient denies fever, sick contacts, recent travel, numbness, tingling, chest pain, sob, dizziness.  In the ED: BP 92/64 HR 91 RR 16 T 96.4 F SpO2 94% RA  WBC: 15.4  Neutrophils 89% Platelets 404 Lactate 3.9 Potassium 5.9 Alk Phos 151 Bili 0.2 Procal 0.41  Pro BNP 1623  CT chest/abdomen: Advanced emphysema. Multiple pulmonary parenchymal metastases. Hepatic metastases. Multistation metastatic adenopathy in the chest and abdomen. Small ascites. Small pericholecystic fluid nonspecific likely a secondary manifestation.   US GB: Layering sludge and/or stones within the lumen of the gallbladder wall with nonspecific gallbladder wall edema and/or thickening. consider HIDA  patient was given 2.8 L NS, 1 dose of levaquin.  Patient had surgical consult and HIDA scan. HIDA scan negative and surgery recommended supportive care. Found to E Coli and treated with Levaquin. Mild QT prolongation remain stable. Remote telemonitoring. Patient was started on Fentanyl patch and increased to 25 mcg q72 hours today. Bone scan negative for definite mets but nonspecific T11-12 findings. Daughter and oncologist do not want CT or MRI of spine at this time because of chronic nature of back pain. Plan is to take her home with close out patient follow up. Daughter is not interested in DNR/DNI or hospice care at this time after 3 meetings. Home care services referral. Total of 7 days of Levaquin. Remeron added today by Dr. Vasquez to help with depression, anxiety and appetite. PCP was contacted and updated as well. 72 yo female patient with pmh of Colon cancer s/p resection @2011 , Liver metastasis s/p biopsy 04/15 Stivarga, Anxiety, Benign ovarian tumor s/p oophorectomy, HTN and HLD presenting with cc of nausea for 2 weeks and vomiting for the past 2 days. She started on her second course of chemotherapy on stivarga (started on 10/3 to end on 10/21). She went to see her oncologist today who told her to visit the ED for her symptoms and elevated WBC seen in the office. Vomiting is described as the the food that the patient eats and non bloody. Daughter states that patient gets abdominal discomfort at times that they attributed to dyspepsia since it was relieved by simethecone. Patient admits to abdominal pain located in the LUQ, stabbing in nature, non radiating rated as an 8/10 with no remitting or exacerbating factors. She is unsure if pain is related to food/bowel movements but states that she has decreased appetite and diarrhea. She admits to weakness and weight loss. Patient denies fever, sick contacts, recent travel, numbness, tingling, chest pain, sob, dizziness.  In the ED: BP 92/64 HR 91 RR 16 T 96.4 F SpO2 94% RA  WBC: 15.4  Neutrophils 89% Platelets 404 Lactate 3.9 Potassium 5.9 Alk Phos 151 Bili 0.2 Procal 0.41  Pro BNP 1623  CT chest/abdomen: Advanced emphysema. Multiple pulmonary parenchymal metastases. Hepatic metastases. Multistation metastatic adenopathy in the chest and abdomen. Small ascites. Small pericholecystic fluid nonspecific likely a secondary manifestation.   US GB: Layering sludge and/or stones within the lumen of the gallbladder wall with nonspecific gallbladder wall edema and/or thickening. consider HIDA  patient was given 2.8 L NS, 1 dose of levaquin.  Patient had surgical consult and HIDA scan. HIDA scan negative and surgery recommended supportive care. Found to E Coli and treated with Levaquin. Mild QT prolongation remain stable. Remote telemonitoring. Patient was started on Fentanyl patch and increased to 25 mcg q72 hours today. Bone scan negative for definite mets but nonspecific T11-12 findings. Daughter and oncologist do not want CT or MRI of spine at this time because of chronic nature of back pain. Plan is to take her home with close out patient follow up. Daughter is not interested in DNR/DNI or hospice care at this time after 3 meetings. Aware prognosis poor and wants to bring her home, refusing ERIN. Home care services referral. Total of 7 days of Levaquin. Remeron added  by Dr. Vasquez to help with depression, anxiety and appetite. PCP was contacted and updated as well.

## 2017-10-20 NOTE — BEHAVIORAL HEALTH ASSESSMENT NOTE - HPI (INCLUDE ILLNESS QUALITY, SEVERITY, DURATION, TIMING, CONTEXT, MODIFYING FACTORS, ASSOCIATED SIGNS AND SYMPTOMS)
70 yo female patient with pmh of colon cancer s/p resection, liver metastasis, anxiety, benign ovarian tumor, and HLD presenting with cc of nausea for 2 weeks and vomiting for the past 2 days. She started on her second course of chemotherapy on stivarga (started on 10/3 to end on 10/21). She went to see her oncologist today who told her to visit the ED for her symptoms and elevated WBC seen in the office. Vomiting is described as the the food that the patient eats and non bloody. Daughter states that patient gets abdominal discomfort at times that they attributed to dyspepsia since it was relieved by simethecone. Patient admits to abdominal pain located in the LUQ, stabbing in nature, non radiating rated as an 8/10 with no remitting or exacerbating factors.   Patient reports that she has been increasingly anxious over the last few weeks in context of worsening autonomy. She reports worsened sleep and increased muscle tension as well as feeling on the edge.

## 2017-10-20 NOTE — PROGRESS NOTE ADULT - ASSESSMENT
71F with colon cancer s/p chemo with stirvaga   Admitted with nausea, vomiting, diarrhea, abd pain and leukocytosis CT/ HIDA without any acute path noted  Urine cx with ecoli  Blood cx NTD  Back pain. no CVAT.  Nonspecific/essentially nondiagnostic/ bone scan.

## 2017-10-20 NOTE — PROGRESS NOTE ADULT - SUBJECTIVE AND OBJECTIVE BOX
[facility], Division of Infectious Diseases  REBEKA Gregory A. Lee    Name: STEPHY POWELL  Age: 71y  Gender: Female  MRN: [id number]    Interval History--  Notes reviewed. Lethargic. Complaints of mid-low back pain midline toward left side. + nausea, no vomiting. No urinary symptoms.    Past Medical History--  Liver metastases  Liver cancer  Colon cancer  History of Osteoporosis  Depression  Hypertension  History of Oophorectomy  Anxiety  Hyperlipemia  Ovary removal, prophylactic  History of colon resection      For details regarding the patient's social history, family history, and other miscellaneous elements, please refer the initial infectious diseases consultation and/or the admitting history and physical examination for this admission.    Allergies    penicillin (Other)    Intolerances        Medications--  Antibiotics:  levoFLOXacin IVPB 500 milliGRAM(s) IV Intermittent every 24 hours  levoFLOXacin IVPB        Immunologic:  influenza   Vaccine 0.5 milliLiter(s) IntraMuscular once    Other:  acetaminophen   Tablet. PRN  ALPRAZolam PRN  docusate sodium PRN  enoxaparin Injectable  fentaNYL   Patch  25 MICROgram(s)/Hr  morphine  - Injectable PRN  ondansetron   Disintegrating Tablet PRN  oxyCODONE    5 mG/acetaminophen 325 mG PRN  senna PRN  sertraline      Review of Systems--  Review of systems otherwise negative except as previously noted.    Physical Examination--  Vital Signs: T(F): 98.2 (10-20-17 @ 06:09), Max: 98.2 (10-20-17 @ 06:09)  HR: 85 (10-20-17 @ 06:09)  BP: 138/81 (10-20-17 @ 06:09)  RR: 18 (10-20-17 @ 06:09)  SpO2: 91% (10-20-17 @ 06:09)  Wt(kg): --  General: Nontoxic-appearing Female in no acute distress.  HEENT: Anicteric. Conj pink/moist. + dentures  Neck: Not rigid. No sense of mass.  Nodes: None palpable.  Lungs: Clear bilaterally without rales, wheezing or rhonchi  Heart: Regular rate and rhythm. No Murmur. No rub. No gallop. No palpable thrill.  Abdomen: Bowel sounds present and normoactive. Firm, not rigid. No tenderness elicited (s/p morphine). Mildly distended.  Extremities: No cyanosis or clubbing. No edema.   Skin: Warm. Dry. Good turgor. No rash. No vasculitic stigmata.  Psychiatric: Appropriate affect and mood for situation.       Laboratory Studies--  CBC                        10.5   11.9  )-----------( 315      ( 20 Oct 2017 09:19 )             33.9       Chemistries  10-20    136  |  100  |  6<L>  ----------------------------<  95  4.6   |  26  |  0.37<L>    Ca    8.0<L>      20 Oct 2017 09:19  Mg     1.8     10-20    TPro  6.2  /  Alb  1.6<L>  /  TBili  1.4<H>  /  DBili  x   /  AST  82<H>  /  ALT  17  /  AlkPhos  400<H>  10-20        < from: NM Bone Imaging Total (10.19.17 @ 17:13) >  EXAM:  NM BONE IMG WHOLE BODY                        PROCEDURE DATE:  10/19/2017    INTERPRETATION:  CLINICAL INFORMATION: 71 year-old female with back pain   and metastatic colon carcinoma, referred for evaluation,       RADIOPHARMACEUTICAL: 20.0 mCi Tc-99m HDP, I.V.     TECHNIQUE:  Whole-body planar images were obtained in the anterior and   posterior projections approximately 2-3 hours after tracer injection.   Additional static views of the chest/ribs were obtained in the anterior,   posterior lateral projections.    COMPARISON: No previous bone scan for comparison.    FINDINGS: The study is limited by patient motion during imaging. There is   a focus of mildly increased tracer uptake in the region of the lower   sternum. There is an area of photopenia in the lower thoracic spine at   the level of T11-T12 vertebra. There is increased tracer uptake in the   right maxilla/nasal region, probably inflammation. There is physiologic   tracer distribution in the remainder of the visualized skeleton.    Both kidneys are visualized and appear symmetric.    IMPRESSION: Bone scan demonstrates:    Focal increased uptake in the lower sternum and photopenic defect at the   level of T11-T12 vertebra are nonspecific. Metastatic disease cannot be   excluded. Suggest correlation with prior diagnostic CT or MRI, if there   are no clinical contraindications.  SHEILA YOUNGBLOOD M.D., NUCLEAR MEDICINE ATTENDING  This document has been electronically signed. Oct 19 2017  5:34PM  < end of copied text >        Culture Data  Culture - Urine (10.16.17 @ 21:20)    -  Amikacin: S <=8    -  Ampicillin: R >16    -  Ampicillin/Sulbactam: I 16/8    -  Aztreonam: S <=4    -  Cefazolin: S <=2    -  Cefepime: S <=2    -  Cefoxitin: S <=4    -  Ceftazidime: S <=1    -  Ceftriaxone: S <=1    -  Ciprofloxacin: S <=0.5    -  Ertapenem: S <=0.5    -  Gentamicin: R >8    -  Imipenem: S <=1    -  Levofloxacin: S <=1    -  Meropenem: S <=1    -  Nitrofurantoin: S <=32    -  Piperacillin/Tazobactam: S <=8    -  Tobramycin: R >8    -  Trimethoprim/Sulfamethoxazole: R >2/38    Specimen Source: .Urine Clean Catch (Midstream)    Culture Results:   >100,000 CFU/ml Escherichia coli    Organism Identification: Escherichia coli    Organism: Escherichia coli    Method Type: LIAM    Culture - Blood (10.16.17 @ 18:24)    Specimen Source: .Blood Blood-Venous    Culture Results:   No growth to date.    Culture - Blood (10.16.17 @ 18:24)    Specimen Source: .Blood Blood-Venous    Culture Results:   No growth to date.

## 2017-10-20 NOTE — DISCHARGE NOTE ADULT - HOME CARE AGENCY
[facility] CARE SERVICES                [facility]/NUCLEUS PROGRAM

## 2017-10-20 NOTE — PROGRESS NOTE ADULT - ASSESSMENT
This is a 72 y/o F with HLD, anxiety, and colon Ca s/p resection with liver mets on chemo presented to the ED c/o nausea x 2 weeks and vomiting x 2 days PTA.  Called for consult due to prolonged QT.    - EKG on admission reviewed; NSR, rate 62, normal axis and good R wave progression; non-specific STD in leads I and II, no TWI and SCOTTY.  Baseline QT (08/17) = 497  - Today's QTc = 479.  will continue to watch closely while on Levaquin and Zofran  - No evidence of ischemia or arrhythmia on the EKG  - Patient is currently on Levaquin and Zofran which are known to prolong QT interval  - Daily EKG.  No ACS symptoms.  follow up cardiac enzymes  - If no arrhythmias overnight, may discontinue telemetry in am  - Monitor electrolytes; replete to keep K>4 and Mag>2  - Further cardiac workup as case unfolds  - Will continue to follow    Shanda Greer NP  Cardiology

## 2017-10-20 NOTE — PHYSICAL THERAPY INITIAL EVALUATION ADULT - GENERAL OBSERVATIONS, REHAB EVAL
Patient received supine in bed. Spouse present. Pt is noted to be groggy, was given morphine in am for pain. Spouse giving history, pt initially agreeable /c PT eval.

## 2017-10-20 NOTE — DISCHARGE NOTE ADULT - MEDICATION SUMMARY - MEDICATIONS TO TAKE
I will START or STAY ON the medications listed below when I get home from the hospital:    ibuprofen 400 mg oral tablet  -- 1 tab(s) by mouth every 6 hours, As needed, Mild to moderate pain  -- Indication: For As needed for Pain    oxyCODONE 5 mg oral tablet  -- 1 tab(s) by mouth every 6 hours, As needed, Severe Pain (7 - 10)  -- Indication: For As needed for Pain    Zoloft 100 mg oral tablet  -- 1 tab(s) by mouth once a day  -- Indication: For Home Med    mirtazapine 7.5 mg oral tablet  -- 1 tab(s) by mouth once a day (at bedtime)  -- Indication: For Home Med    ondansetron 4 mg oral tablet, disintegrating  -- 1 tab(s) by mouth every 6 hours, As needed, Nausea and/or Vomiting  -- Indication: For As needed for Nausea    ALPRAZolam 0.5 mg oral tablet  -- 1 tab(s) by mouth 2 times a day, PRN for anxiety MDD:2  -- Avoid grapefruit and grapefruit juice while taking this medication.  Caution federal law prohibits the transfer of this drug to any person other  than the person for whom it was prescribed.  Do not take this drug if you are pregnant.  May cause drowsiness.  Alcohol may intensify this effect.  Use care when operating dangerous machinery.    -- Indication: For Home Med    docusate sodium 100 mg oral capsule  -- 1 cap(s) by mouth 3 times a day, As needed, Constipation  -- Indication: For As needed for constipation    polyethylene glycol 3350 oral powder for reconstitution  -- 17 gram(s) by mouth once a day, As needed, Constipation  -- Indication: For As needed for Constipation    senna oral tablet  -- 2 tab(s) by mouth once a day (at bedtime), As needed, Constipation  -- Indication: For As needed for Constipation    Stivarga 40 mg oral tablet  -- orally 3 times a day  -- Indication: For Home Med

## 2017-10-20 NOTE — PHYSICAL THERAPY INITIAL EVALUATION ADULT - BALANCE TRAINING, PT EVAL
Improve static sitting balance to Good- in 4 sessions and assess standing and dynamic balance /c rolling walker in 1-3 sessions

## 2017-10-20 NOTE — PROGRESS NOTE ADULT - SUBJECTIVE AND OBJECTIVE BOX
HPI:  72 yo female patient with pmh of colon cancer s/p resection, liver metastasis, anxiety, benign ovarian tumor, and HLD presenting with cc of nausea for 2 weeks and vomiting for the past 2 days. She started on her second course of chemotherapy on stivarga (started on 10/3 to end on 10/21). She went to see her oncologist today who told her to visit the ED for her symptoms and elevated WBC seen in the office. Vomiting is described as the the food that the patient eats and non bloody. Daughter states that patient gets abdominal discomfort at times that they attributed to dyspepsia since it was relieved by simethecone. Patient admits to abdominal pain located in the LUQ, stabbing in nature, non radiating rated as an 8/10 with no remitting or exacerbating factors. She is unsure if pain is related to food/bowel movements but states that she has decreased appetite and diarrhea. She admits to weakness and weight loss. Patient denies fever, sick contacts, recent travel, numbness, tingling, chest pain, sob, dizziness.  In the ED: BP 92/64 HR 91 RR 16 T 96.4 F SpO2 94% RA  WBC: 15.4  Neutrophils 89% Platelets 404 Lactate 3.9 Potassium 5.9 Alk Phos 151 Bili 0.2 Procal 0.41  Pro BNP 1623  CT chest/abdomen: Advanced emphysema. Multiple pulmonary parenchymal metastases. Hepatic metastases. Multistation metastatic adenopathy in the chest and abdomen. Small ascites. Small pericholecystic fluid nonspecific likely a secondary manifestation.   US GB: Layering sludge and/or stones within the lumen of the gallbladder wall with nonspecific gallbladder wall edema and/or thickening. consider HIDA  patient was given 2.8 L NS, 1 dose of levaquin (16 Oct 2017 21:13)    SUBJECTIVE:  Patient is a 71y old  Female who presents with a chief complaint of nausea, vomiting (16 Oct 2017 21:13)    Feeling depressed.  c/o weakness and nausea,  Denies CP, SOB.    OBJECTIVE:  Review Of Systems:  Constitutional: [ ] Fever [ ] Chills [ ] Fatigue [ ] Weight change   HEENT: [ ] Blurred vision [ ] Eye Pain [ ] Headache [ ] Runny nose [ ] Sore Throat   Respiratory: [ ] Cough [ ] Wheezing [ ] Shortness of breath  Cardiovascular: [ ] Chest Pain [ ] Palpitations [ ] BOWEN [ ] PND [ ] Orthopnea  Gastrointestinal: [ ] Abdominal Pain [ ] Diarrhea [ ] Constipation [ ] Hemorrhoids [ ] Nausea [ ] Vomiting  Genitourinary: [ ] Nocturia [ ] Dysuria [ ] Incontinence  Extremities: [ ] Swelling [ ] Joint Pain  Neurologic: [ ] Focal deficit [ ] Paresthesias [ ] Syncope  Lymphatic: [ ] Swelling [ ] Lymphadenopathy   Skin: [ ] Rash [ ] Ecchymoses [ ] Wounds [ ] Lesions  Psychiatry: [ ] Depression [ ] Suicidal/Homicidal Ideation [ ] Anxiety [ ] Sleep Disturbances  [ ] 10 point review of systems is otherwise negative except as mentioned above            [ ]Unable to obtain    Allergy:  Allergies    penicillin (Other)    Intolerances        Medications:  MEDICATIONS  (STANDING):  enoxaparin Injectable 40 milliGRAM(s) SubCutaneous every 24 hours  fentaNYL   Patch  25 MICROgram(s)/Hr 1 Patch Transdermal every 72 hours  influenza   Vaccine 0.5 milliLiter(s) IntraMuscular once  levoFLOXacin  Tablet 500 milliGRAM(s) Oral every 24 hours  magnesium oxide 400 milliGRAM(s) Oral once  sertraline 100 milliGRAM(s) Oral daily    MEDICATIONS  (PRN):  acetaminophen   Tablet. 650 milliGRAM(s) Oral every 8 hours PRN Mild Pain (1 - 3)  ALPRAZolam 0.5 milliGRAM(s) Oral two times a day PRN anxiety  docusate sodium 100 milliGRAM(s) Oral three times a day PRN Constipation  morphine  - Injectable 2 milliGRAM(s) IV Push every 6 hours PRN Severe Pain (7 - 10)  ondansetron   Disintegrating Tablet 4 milliGRAM(s) Oral every 6 hours PRN Nausea and/or Vomiting  oxyCODONE    5 mG/acetaminophen 325 mG 1 Tablet(s) Oral every 6 hours PRN Moderate Pain (4 - 6)  senna 2 Tablet(s) Oral at bedtime PRN Constipation      PMH/PSH/FH/SH: [ ] Unchanged    Vitals:  T(C): 36.8 (10-20-17 @ 06:09), Max: 36.8 (10-20-17 @ 06:09)  HR: 85 (10-20-17 @ 06:09) (85 - 101)  BP: 138/81 (10-20-17 @ 06:09) (110/70 - 138/81)  BP(mean): --  RR: 18 (10-20-17 @ 06:09) (17 - 18)  SpO2: 91% (10-20-17 @ 06:09) (90% - 91%)  Wt(kg): --  Daily     Daily Weight in k.7 (19 Oct 2017 14:59)  I&O's Summary    19 Oct 2017 07:01  -  20 Oct 2017 07:00  --------------------------------------------------------  IN: 360 mL / OUT: 200 mL / NET: 160 mL        Labs:                        10.5   11.9  )-----------( 315      ( 20 Oct 2017 09:19 )             33.9     10-20    136  |  100  |  6<L>  ----------------------------<  95  4.6   |  26  |  0.37<L>    Ca    8.0<L>      20 Oct 2017 09:19  Mg     1.8     10-20    TPro  6.2  /  Alb  1.6<L>  /  TBili  1.4<H>  /  DBili  .40<H>  /  AST  82<H>  /  ALT  17  /  AlkPhos  400<H>  10-20    Serum Pro-Brain Natriuretic Peptide: 1923 pg/mL (10-20 @ 09:19)  Magnesium, Serum: 1.8 mg/dL (10-20 @ 09:19)    ECG:  < from: 12 Lead ECG (10.20.17 @ 09:20) >    Ventricular Rate 80 BPM    Atrial Rate 80 BPM    P-R Interval 112 ms    QRS Duration 80 ms    Q-T Interval 416 ms    QTC Calculation(Bezet) 479 ms    P Axis 47 degrees    R Axis 4 degrees    T Axis 33 degrees    Diagnosis Line Sinus rhythm  ST & T wave abnormality, consider anterior ischemia  Prolonged QT    Confirmed by SHANNON HUNTER ([id number]) on 10/20/2017 11:54:09 AM    < end of copied text >    Echo:    Stress Testing:     Cath:    Imaging:  < from: CT Angio Chest w/ IV Cont (10.16.17 @ 16:29) >    EXAM:  CT ANGIO CHEST (W)AW IC                          PROCEDURE DATE:  10/16/2017      INTERPRETATION:  History: Sepsis, hypotension, antecedent history of   cancer.    CTA chest and contrast-enhanced abdominal CT.   95 cc Omnipaque 350 injected intravenously.  Axial images coronal sagittal reformats, MIP images.  Satisfactory contrast bolus. No pulmonary emboli.  Nonaneurysmal thoracic aorta.  Patent central airways. Mild pretracheal adenopathy with enlarged lymph   node measuring up to 1.8 cm. Posterior mediastinal adenopathy with lymph   nodes measuring up to 2 cm. Nodular thyroid. Recommend ultrasound   correlation.  Fairly advanced emphysematous change. Multiple bilateral widely   disseminated pulmonary parenchymal nodules consistent with metastases.   Dependent atelectatic changes at the lung bases.  Heart not grossly enlarged. Small pericardial effusion. No calcified   gallstones or biliary dilatation. Trace cholecystic fluid is nonspecific   likely related to ascites or hypoalbuminemia. Cholecystitis considered   less likely.  Liver is enlarged with multiple metastatic foci including partially   calcified lesion right lobe. Spleen slightly enlarged.  Bulky metastatic periportal, portacaval, retroperitoneal adenopathy.  Colonic diverticula no diverticulitis or other active bowel inflammation.   No bowel obstruction.  Small right upper quadrant and pelvic ascites. No organized fluid   collections. No extraluminal gas. Calcified uterine fibroids. A pessary   noted in the pelvis. Bladder not remarkable.  Nonspecific sclerotic focus involving upper thoracic vertebral body   probable bone island. An early blastic lesion less likely.    Impression:    No pulmonary emboli.  Advanced emphysema.  Multiple pulmonary parenchymal metastases.  Hepatic metastases.  Multistation metastatic adenopathy in the chest and abdomen as described.  Small ascites.  Small pericholecystic fluid nonspecific likely a secondary manifestation.   If there is suspicion for cholecystitis HIDA scan might be considered.    CHRISTINA SALGADO M.D., ATTENDING RADIOLOGIST  This document has been electronically signed. Oct 16 2017  4:48PM      < end of copied text >    Interpretation of Telemetry:  NSR at 90's no tele events    Physical Exam:  Appearance: [ ] Normal  [ ] abnormal [ ] NAD   Eyes: [ ] PERRL [ ] EOMI  HENT: [ ] Normal [ ] Abnormal oral muscosa [ ]NC/AT  Cardiovascular: [ ] S1 [ ] S2 [ ] RRR [ ] m/r/g [ ]edema [ ] JVP  Procedural Access Site: [ ]  hematoma [ ] tender to palpation [ ] 2+ pulse [ ] bruit [ ] Ecchymosis  Respiratory: [ ] Clear to auscultation bilaterally  Gastrointestinal: [ ] Soft [ ] tenderness[ ] distension [ ] BS  Musculoskeletal: [ ] clubbing [ ] joint deformity   Neurologic: [ ] Non-focal  Lymphatic: [ ] lymphadenopathy  Psychiatry: [ ] AAOx3  [ ] confused [ ] disoriented [ ] Mood & affect appropriate  Skin: [ ]  rashes [ ] ecchymoses [ ] cyanosis HPI:  70 yo female patient with pmh of colon cancer s/p resection, liver metastasis, anxiety, benign ovarian tumor, and HLD presenting with cc of nausea for 2 weeks and vomiting for the past 2 days. She started on her second course of chemotherapy on stivarga (started on 10/3 to end on 10/21). She went to see her oncologist today who told her to visit the ED for her symptoms and elevated WBC seen in the office. Vomiting is described as the the food that the patient eats and non bloody. Daughter states that patient gets abdominal discomfort at times that they attributed to dyspepsia since it was relieved by simethecone. Patient admits to abdominal pain located in the LUQ, stabbing in nature, non radiating rated as an 8/10 with no remitting or exacerbating factors. She is unsure if pain is related to food/bowel movements but states that she has decreased appetite and diarrhea. She admits to weakness and weight loss. Patient denies fever, sick contacts, recent travel, numbness, tingling, chest pain, sob, dizziness.  In the ED: BP 92/64 HR 91 RR 16 T 96.4 F SpO2 94% RA  WBC: 15.4  Neutrophils 89% Platelets 404 Lactate 3.9 Potassium 5.9 Alk Phos 151 Bili 0.2 Procal 0.41  Pro BNP 1623  CT chest/abdomen: Advanced emphysema. Multiple pulmonary parenchymal metastases. Hepatic metastases. Multistation metastatic adenopathy in the chest and abdomen. Small ascites. Small pericholecystic fluid nonspecific likely a secondary manifestation.   US GB: Layering sludge and/or stones within the lumen of the gallbladder wall with nonspecific gallbladder wall edema and/or thickening. consider HIDA  patient was given 2.8 L NS, 1 dose of levaquin (16 Oct 2017 21:13)    SUBJECTIVE:  Patient is a 71y old  Female who presents with a chief complaint of nausea, vomiting (16 Oct 2017 21:13)    Feeling depressed.  c/o weakness and nausea,  Denies CP, SOB.    OBJECTIVE:  Review Of Systems:  Constitutional: [ ] Fever [ ] Chills [ ] Fatigue [ ] Weight change   HEENT: [ ] Blurred vision [ ] Eye Pain [ ] Headache [ ] Runny nose [ ] Sore Throat   Respiratory: [ ] Cough [ ] Wheezing [ ] Shortness of breath  Cardiovascular: [ ] Chest Pain [ ] Palpitations [ ] BOWEN [ ] PND [ ] Orthopnea  Gastrointestinal: [ ] Abdominal Pain [ ] Diarrhea [ ] Constipation [ ] Hemorrhoids [ ] Nausea [ ] Vomiting  Genitourinary: [ ] Nocturia [ ] Dysuria [ ] Incontinence  Extremities: [ ] Swelling [ ] Joint Pain  Neurologic: [ ] Focal deficit [ ] Paresthesias [ ] Syncope  Lymphatic: [ ] Swelling [ ] Lymphadenopathy   Skin: [ ] Rash [ ] Ecchymoses [ ] Wounds [ ] Lesions  Psychiatry: [ ] Depression [ ] Suicidal/Homicidal Ideation [ ] Anxiety [ ] Sleep Disturbances  [x ] 10 point review of systems is otherwise negative except as mentioned above            [ ]Unable to obtain    Allergy:  Allergies  penicillin (Other)    Intolerances    Medications:  MEDICATIONS  (STANDING):  enoxaparin Injectable 40 milliGRAM(s) SubCutaneous every 24 hours  fentaNYL   Patch  25 MICROgram(s)/Hr 1 Patch Transdermal every 72 hours  influenza   Vaccine 0.5 milliLiter(s) IntraMuscular once  levoFLOXacin  Tablet 500 milliGRAM(s) Oral every 24 hours  magnesium oxide 400 milliGRAM(s) Oral once  sertraline 100 milliGRAM(s) Oral daily    MEDICATIONS  (PRN):  acetaminophen   Tablet. 650 milliGRAM(s) Oral every 8 hours PRN Mild Pain (1 - 3)  ALPRAZolam 0.5 milliGRAM(s) Oral two times a day PRN anxiety  docusate sodium 100 milliGRAM(s) Oral three times a day PRN Constipation  morphine  - Injectable 2 milliGRAM(s) IV Push every 6 hours PRN Severe Pain (7 - 10)  ondansetron   Disintegrating Tablet 4 milliGRAM(s) Oral every 6 hours PRN Nausea and/or Vomiting  oxyCODONE    5 mG/acetaminophen 325 mG 1 Tablet(s) Oral every 6 hours PRN Moderate Pain (4 - 6)  senna 2 Tablet(s) Oral at bedtime PRN Constipation    PMH/PSH/FH/SH: [ ] Unchanged    Vitals:  T(C): 36.8 (10-20-17 @ 06:09), Max: 36.8 (10-20-17 @ 06:09)  HR: 85 (10-20-17 @ 06:09) (85 - 101)  BP: 138/81 (10-20-17 @ 06:09) (110/70 - 138/81)  BP(mean): --  RR: 18 (10-20-17 @ 06:09) (17 - 18)  SpO2: 91% (10-20-17 @ 06:09) (90% - 91%)  Wt(kg): --  Daily     Daily Weight in k.7 (19 Oct 2017 14:59)  I&O's Summary    19 Oct 2017 07:01  -  20 Oct 2017 07:00  --------------------------------------------------------  IN: 360 mL / OUT: 200 mL / NET: 160 mL    Labs:                        10.5   11.9  )-----------( 315      ( 20 Oct 2017 09:19 )             33.9     10-20    136  |  100  |  6<L>  ----------------------------<  95  4.6   |  26  |  0.37<L>    Ca    8.0<L>      20 Oct 2017 09:19  Mg     1.8     10-20    TPro  6.2  /  Alb  1.6<L>  /  TBili  1.4<H>  /  DBili  .40<H>  /  AST  82<H>  /  ALT  17  /  AlkPhos  400<H>  10-20    Serum Pro-Brain Natriuretic Peptide: 1923 pg/mL (10-20 @ 09:19)  Magnesium, Serum: 1.8 mg/dL (10-20 @ 09:19)    ECG:  < from: 12 Lead ECG (10.20.17 @ 09:20) >    Ventricular Rate 80 BPM    Atrial Rate 80 BPM    P-R Interval 112 ms    QRS Duration 80 ms    Q-T Interval 416 ms    QTC Calculation(Bezet) 479 ms    P Axis 47 degrees    R Axis 4 degrees    T Axis 33 degrees    Diagnosis Line Sinus rhythm  ST & T wave abnormality, consider anterior ischemia  Prolonged QT    Confirmed by SHANNON HUNTER ([id number]) on 10/20/2017 11:54:09 AM    < end of copied text >    Echo:    Stress Testing:     Cath:    Imaging:  < from: CT Angio Chest w/ IV Cont (10.16.17 @ 16:29) >    EXAM:  CT ANGIO CHEST (W)AW IC                          PROCEDURE DATE:  10/16/2017      INTERPRETATION:  History: Sepsis, hypotension, antecedent history of   cancer.    CTA chest and contrast-enhanced abdominal CT.   95 cc Omnipaque 350 injected intravenously.  Axial images coronal sagittal reformats, MIP images.  Satisfactory contrast bolus. No pulmonary emboli.  Nonaneurysmal thoracic aorta.  Patent central airways. Mild pretracheal adenopathy with enlarged lymph   node measuring up to 1.8 cm. Posterior mediastinal adenopathy with lymph   nodes measuring up to 2 cm. Nodular thyroid. Recommend ultrasound   correlation.  Fairly advanced emphysematous change. Multiple bilateral widely   disseminated pulmonary parenchymal nodules consistent with metastases.   Dependent atelectatic changes at the lung bases.  Heart not grossly enlarged. Small pericardial effusion. No calcified   gallstones or biliary dilatation. Trace cholecystic fluid is nonspecific   likely related to ascites or hypoalbuminemia. Cholecystitis considered   less likely.  Liver is enlarged with multiple metastatic foci including partially   calcified lesion right lobe. Spleen slightly enlarged.  Bulky metastatic periportal, portacaval, retroperitoneal adenopathy.  Colonic diverticula no diverticulitis or other active bowel inflammation.   No bowel obstruction.  Small right upper quadrant and pelvic ascites. No organized fluid   collections. No extraluminal gas. Calcified uterine fibroids. A pessary   noted in the pelvis. Bladder not remarkable.  Nonspecific sclerotic focus involving upper thoracic vertebral body   probable bone island. An early blastic lesion less likely.    Impression:    No pulmonary emboli.  Advanced emphysema.  Multiple pulmonary parenchymal metastases.  Hepatic metastases.  Multistation metastatic adenopathy in the chest and abdomen as described.  Small ascites.  Small pericholecystic fluid nonspecific likely a secondary manifestation.   If there is suspicion for cholecystitis HIDA scan might be considered.    CHRISTINA SALGADO M.D., ATTENDING RADIOLOGIST  This document has been electronically signed. Oct 16 2017  4:48PM      < end of copied text >    Interpretation of Telemetry:  NSR at 90's no tele events    Physical Exam:  Appearance: [ ] Normal  [ ] abnormal [x ] NAD   Eyes: [x ] PERRL [ ] EOMI  HENT: [x ] Normal [ ] Abnormal oral muscosa [ ]NC/AT  Cardiovascular: [x ] S1 [x ] S2 [ ] RRR [ ] m/r/g [ ]edema [ ] JVP  Procedural Access Site: [ ]  hematoma [ ] tender to palpation [ ] 2+ pulse [ ] bruit [ ] Ecchymosis  Respiratory: [x ] Clear to auscultation bilaterally  Gastrointestinal: [x ] Soft [x ] tenderness[ ] distension [x ] BS  Musculoskeletal: [ ] clubbing [ ] joint deformity   Neurologic: [ ] Non-focal  Lymphatic: [ ] lymphadenopathy  Psychiatry: [x ] AAOx3  [ ] confused [ ] disoriented [ ] Mood & affect appropriate  [x] Depressed  Skin: [ ]  rashes [ ] ecchymoses [ ] cyanosis

## 2017-10-20 NOTE — PROGRESS NOTE ADULT - PROBLEM SELECTOR PLAN 1
Possibly due to UTI cause by E.Coli.  as HIDA scan negative. IV Levaquin day #5 and  got 2 days IV Flagyl. Will stop IV Flagyl as HIDA scan negative. Blood culture remain negative and noted ID input.

## 2017-10-20 NOTE — PHYSICAL THERAPY INITIAL EVALUATION ADULT - PERTINENT HX OF CURRENT PROBLEM, REHAB EVAL
As per H&P:" presenting with cc of nausea for 2 weeks and vomiting for the past 2 days. She started on her second course of chemotherapy on stivarga (started on 10/3 to end on 10/21). She went to see her oncologist today who told her to visit the ED for her symptoms and elevated WBC seen in the office. Vomiting is described as the the food that the patient eats and non bloody. Pt /c abdominal pain located in the LUQ, stabbing in nature, non radiating rated as an 8/10".

## 2017-10-21 DIAGNOSIS — R53.83 OTHER FATIGUE: ICD-10-CM

## 2017-10-21 LAB
ANION GAP SERPL CALC-SCNC: 9 MMOL/L — SIGNIFICANT CHANGE UP (ref 5–17)
BUN SERPL-MCNC: 6 MG/DL — LOW (ref 7–23)
CALCIUM SERPL-MCNC: 7.8 MG/DL — LOW (ref 8.5–10.1)
CHLORIDE SERPL-SCNC: 99 MMOL/L — SIGNIFICANT CHANGE UP (ref 96–108)
CO2 SERPL-SCNC: 27 MMOL/L — SIGNIFICANT CHANGE UP (ref 22–31)
CREAT SERPL-MCNC: 0.24 MG/DL — LOW (ref 0.5–1.3)
CULTURE RESULTS: SIGNIFICANT CHANGE UP
CULTURE RESULTS: SIGNIFICANT CHANGE UP
GLUCOSE SERPL-MCNC: 108 MG/DL — HIGH (ref 70–99)
HCT VFR BLD CALC: 34.2 % — LOW (ref 34.5–45)
HGB BLD-MCNC: 10.9 G/DL — LOW (ref 11.5–15.5)
MAGNESIUM SERPL-MCNC: 1.7 MG/DL — SIGNIFICANT CHANGE UP (ref 1.6–2.6)
MCHC RBC-ENTMCNC: 28.2 PG — SIGNIFICANT CHANGE UP (ref 27–34)
MCHC RBC-ENTMCNC: 31.9 GM/DL — LOW (ref 32–36)
MCV RBC AUTO: 88.6 FL — SIGNIFICANT CHANGE UP (ref 80–100)
PLATELET # BLD AUTO: 332 K/UL — SIGNIFICANT CHANGE UP (ref 150–400)
POTASSIUM SERPL-MCNC: 3.1 MMOL/L — LOW (ref 3.5–5.3)
POTASSIUM SERPL-SCNC: 3.1 MMOL/L — LOW (ref 3.5–5.3)
RBC # BLD: 3.86 M/UL — SIGNIFICANT CHANGE UP (ref 3.8–5.2)
RBC # FLD: 17.2 % — HIGH (ref 10.3–14.5)
SODIUM SERPL-SCNC: 135 MMOL/L — SIGNIFICANT CHANGE UP (ref 135–145)
SPECIMEN SOURCE: SIGNIFICANT CHANGE UP
SPECIMEN SOURCE: SIGNIFICANT CHANGE UP
WBC # BLD: 15.1 K/UL — HIGH (ref 3.8–10.5)
WBC # FLD AUTO: 15.1 K/UL — HIGH (ref 3.8–10.5)

## 2017-10-21 PROCEDURE — 99233 SBSQ HOSP IP/OBS HIGH 50: CPT

## 2017-10-21 PROCEDURE — 93010 ELECTROCARDIOGRAM REPORT: CPT

## 2017-10-21 RX ORDER — IBUPROFEN 200 MG
400 TABLET ORAL EVERY 6 HOURS
Qty: 0 | Refills: 0 | Status: DISCONTINUED | OUTPATIENT
Start: 2017-10-21 | End: 2017-10-23

## 2017-10-21 RX ORDER — POLYETHYLENE GLYCOL 3350 17 G/17G
17 POWDER, FOR SOLUTION ORAL DAILY
Qty: 0 | Refills: 0 | Status: DISCONTINUED | OUTPATIENT
Start: 2017-10-21 | End: 2017-10-23

## 2017-10-21 RX ORDER — POTASSIUM CHLORIDE 20 MEQ
40 PACKET (EA) ORAL EVERY 4 HOURS
Qty: 0 | Refills: 0 | Status: COMPLETED | OUTPATIENT
Start: 2017-10-21 | End: 2017-10-21

## 2017-10-21 RX ORDER — OXYCODONE HYDROCHLORIDE 5 MG/1
5 TABLET ORAL EVERY 6 HOURS
Qty: 0 | Refills: 0 | Status: DISCONTINUED | OUTPATIENT
Start: 2017-10-21 | End: 2017-10-23

## 2017-10-21 RX ADMIN — ENOXAPARIN SODIUM 40 MILLIGRAM(S): 100 INJECTION SUBCUTANEOUS at 11:59

## 2017-10-21 RX ADMIN — OXYCODONE HYDROCHLORIDE 5 MILLIGRAM(S): 5 TABLET ORAL at 16:32

## 2017-10-21 RX ADMIN — Medication 400 MILLIGRAM(S): at 16:32

## 2017-10-21 RX ADMIN — Medication 40 MILLIEQUIVALENT(S): at 10:16

## 2017-10-21 RX ADMIN — Medication 400 MILLIGRAM(S): at 14:39

## 2017-10-21 RX ADMIN — OXYCODONE HYDROCHLORIDE 5 MILLIGRAM(S): 5 TABLET ORAL at 14:39

## 2017-10-21 RX ADMIN — MIRTAZAPINE 7.5 MILLIGRAM(S): 45 TABLET, ORALLY DISINTEGRATING ORAL at 22:42

## 2017-10-21 RX ADMIN — FENTANYL CITRATE 1 PATCH: 50 INJECTION INTRAVENOUS at 10:21

## 2017-10-21 RX ADMIN — Medication 40 MILLIEQUIVALENT(S): at 14:26

## 2017-10-21 RX ADMIN — SERTRALINE 100 MILLIGRAM(S): 25 TABLET, FILM COATED ORAL at 11:59

## 2017-10-21 RX ADMIN — ONDANSETRON 4 MILLIGRAM(S): 8 TABLET, FILM COATED ORAL at 05:00

## 2017-10-21 NOTE — PROGRESS NOTE ADULT - PROBLEM SELECTOR PLAN 1
Possibly due to UTI cause by E.Coli.  as HIDA scan negative. IV Levaquin day #6 and  got 2 days IV Flagyl. Blood culture remain negative and noted ID input, to complete total of 7 days of antibiotics.

## 2017-10-21 NOTE — PROGRESS NOTE ADULT - SUBJECTIVE AND OBJECTIVE BOX
[facility] - Zac Inman, Yobani, Jack, Lea, Alejandra Barksdale  Office Number:  [phone]    Patient resting comfortably in bed in NAD.  Laying flat with no respiratory distress.  Patient lethargic today, likely from overmedication.    Telemetry:  Sinus rhythm.    MEDICATIONS  (STANDING):  enoxaparin Injectable 40 milliGRAM(s) SubCutaneous every 24 hours  influenza   Vaccine 0.5 milliLiter(s) IntraMuscular once  levoFLOXacin  Tablet 500 milliGRAM(s) Oral every 24 hours  mirtazapine 7.5 milliGRAM(s) Oral at bedtime  potassium chloride    Tablet ER 40 milliEquivalent(s) Oral every 4 hours  sertraline 100 milliGRAM(s) Oral daily    MEDICATIONS  (PRN):  ALPRAZolam 0.5 milliGRAM(s) Oral two times a day PRN anxiety  docusate sodium 100 milliGRAM(s) Oral three times a day PRN Constipation  ibuprofen  Tablet 400 milliGRAM(s) Oral every 6 hours PRN Mild to moderate pain  ondansetron   Disintegrating Tablet 4 milliGRAM(s) Oral every 6 hours PRN Nausea and/or Vomiting  oxyCODONE    IR 5 milliGRAM(s) Oral every 6 hours PRN Severe Pain (7 - 10)  polyethylene glycol 3350 17 Gram(s) Oral daily PRN Constipation  senna 2 Tablet(s) Oral at bedtime PRN Constipation      Allergies    penicillin (Other)    Intolerances        Vital Signs Last 24 Hrs  T(C): 36.7 (21 Oct 2017 05:33), Max: 36.7 (21 Oct 2017 05:33)  T(F): 98.1 (21 Oct 2017 05:33), Max: 98.1 (21 Oct 2017 05:33)  HR: 78 (21 Oct 2017 05:33) (78 - 95)  BP: 117/70 (21 Oct 2017 05:33) (117/70 - 142/83)  BP(mean): --  RR: 18 (21 Oct 2017 05:33) (18 - 20)  SpO2: 100% (21 Oct 2017 05:33) (90% - 100%)    I&O's Summary      ON EXAM:    General: NAD, lethargic  HEENT: Mucous membranes are moist, anicteric  Lungs: Non-labored, breath sounds are clear bilaterally, No wheezing, rales or rhonchi  Cardiovascular: Regular, S1 and S2, no murmurs, rubs, or gallops  Gastrointestinal: Bowel Sounds present, soft, nontender.   Lymph: No peripheral edema. No lymphadenopathy.  Skin: No rashes or ulcers  Psych:  unable to assess    LABS: All Labs Reviewed:                        10.9   15.1  )-----------( 332      ( 21 Oct 2017 07:48 )             34.2                         10.5   11.9  )-----------( 315      ( 20 Oct 2017 09:19 )             33.9                         10.8   11.8  )-----------( 312      ( 19 Oct 2017 06:35 )             35.5     21 Oct 2017 07:48    135    |  99     |  6      ----------------------------<  108    3.1     |  27     |  0.24   20 Oct 2017 09:19    136    |  100    |  6      ----------------------------<  95     4.6     |  26     |  0.37   19 Oct 2017 06:35    138    |  100    |  5      ----------------------------<  97     3.3     |  26     |  0.43     Ca    7.8        21 Oct 2017 07:48  Ca    8.0        20 Oct 2017 09:19  Ca    8.1        19 Oct 2017 06:35  Mg     1.7       21 Oct 2017 07:48  Mg     1.8       20 Oct 2017 09:19  Mg     1.9       19 Oct 2017 10:11    TPro  6.2    /  Alb  1.6    /  TBili  1.4    /  DBili  .40    /  AST  82     /  ALT  17     /  AlkPhos  400    20 Oct 2017 09:19  TPro  6.3    /  Alb  1.7    /  TBili  1.0    /  DBili  .50    /  AST  80     /  ALT  21     /  AlkPhos  418    19 Oct 2017 06:35      CARDIAC MARKERS ( 20 Oct 2017 13:47 )  <.015 ng/mL / x     / 273 U/L / x     / x          Blood Culture: Organism Escherichia coli  Gram Stain Blood -- Gram Stain --  Specimen Source .Urine Clean Catch (Midstream)  Culture-Blood --    Organism --  Gram Stain Blood -- Gram Stain --  Specimen Source .Blood Blood-Venous  Culture-Blood --      10-20 @ 09:19  Pro Bnp 1923

## 2017-10-21 NOTE — PROGRESS NOTE ADULT - ASSESSMENT
This is a 70 y/o F with HLD, anxiety, and colon Ca s/p resection with liver mets on chemo presented to the ED c/o nausea x 2 weeks and vomiting x 2 days PTA.  Called for consult due to prolonged QT.    - QT improving on daily EKGs  - D/c remote telemetry   - No evidence of ischemia or arrhythmia on the EKG  - Patient is currently on Levaquin and Zofran which are known to prolong QT interval.  QT is stable  - Daily EKG.   - Monitor electrolytes; replete to keep K>4 and Mag>2  - Further cardiac workup as case unfolds  - Will continue to follow

## 2017-10-21 NOTE — PROGRESS NOTE ADULT - PROBLEM SELECTOR PLAN 10
Likely  secondary to pain meds. D/W daughter, agreed with Oxcodon 5mg IR Q6 hours for severe pain and Ibuprofen 400mg Q 6 hours for mild to moderate pain. Would avoid Tylenol secondary to hepatotoxicity.  Daughter states that patient was advised by her doctors to not to take tylenol secondary to liver mets.

## 2017-10-21 NOTE — PROGRESS NOTE ADULT - ASSESSMENT
72 yo female patient with pmh of Colon cancer s/p resection @2011 , Liver metastasis s/p biopsy 04/15 Stivarga, Anxiety, Benign ovarian tumor s/p oophorectomy, HTN and HLD presenting with cc of nausea for 2 weeks and vomiting for the past 2 days. She started on her second course of chemotherapy on stivarga (started on 10/3 to end on 10/21). She went to see her oncologist today who told her to visit the ED for her symptoms and elevated WBC seen in the office. Vomiting is described as the food that the patient eats and non bloody and admitted for sepsis due to suspected acute cholecystitis and midback pain. HIDA scan negative and urine culture growing E.Coli.

## 2017-10-21 NOTE — PROGRESS NOTE ADULT - SUBJECTIVE AND OBJECTIVE BOX
Patient is a 71y old  Female who presents with a chief complaint of nausea, vomiting (20 Oct 2017 17:53)       INTERVAL HPI/OVERNIGHT EVENTS: Lethargic. Daughter at bedside, states that patient has been very sleepy because of pain meds and has not been eating. Patient able to communicate once prompted. States has no appetite      MEDICATIONS  (STANDING):  enoxaparin Injectable 40 milliGRAM(s) SubCutaneous every 24 hours  influenza   Vaccine 0.5 milliLiter(s) IntraMuscular once  levoFLOXacin  Tablet 500 milliGRAM(s) Oral every 24 hours  mirtazapine 7.5 milliGRAM(s) Oral at bedtime  potassium chloride    Tablet ER 40 milliEquivalent(s) Oral every 4 hours  sertraline 100 milliGRAM(s) Oral daily    MEDICATIONS  (PRN):  ALPRAZolam 0.5 milliGRAM(s) Oral two times a day PRN anxiety  docusate sodium 100 milliGRAM(s) Oral three times a day PRN Constipation  ibuprofen  Tablet 400 milliGRAM(s) Oral every 6 hours PRN Mild to moderate pain  ondansetron   Disintegrating Tablet 4 milliGRAM(s) Oral every 6 hours PRN Nausea and/or Vomiting  oxyCODONE    IR 5 milliGRAM(s) Oral every 6 hours PRN Severe Pain (7 - 10)  polyethylene glycol 3350 17 Gram(s) Oral daily PRN Constipation  senna 2 Tablet(s) Oral at bedtime PRN Constipation      Allergies    penicillin (Other)    Intolerances        REVIEW OF SYSTEMS:  All 10 systems reviewed and negative except per HPI.   Vital Signs Last 24 Hrs  T(C): 36.7 (21 Oct 2017 05:33), Max: 36.7 (21 Oct 2017 05:33)  T(F): 98.1 (21 Oct 2017 05:33), Max: 98.1 (21 Oct 2017 05:33)  HR: 78 (21 Oct 2017 05:33) (78 - 95)  BP: 117/70 (21 Oct 2017 05:33) (117/70 - 142/83)  BP(mean): --  RR: 18 (21 Oct 2017 05:33) (18 - 20)  SpO2: 100% (21 Oct 2017 05:33) (90% - 100%)    PHYSICAL EXAM:  GENERAL: NAD, Lethargic  HEAD:  Atraumatic, Normocephalic  EYES: EOMI, PERRLA, conjunctiva and sclera clear  ENMT: No tonsillar erythema, exudates, or enlargement; Moist mucous membranes.  NECK: Supple, No JVD, Normal thyroid  NERVOUS SYSTEM:  Lethargic but able to be awakened. Non focal.   CHEST/LUNG: Clear to auscultation bilaterally; No rales, rhonchi, wheezing, or rubs  HEART: Regular rate and rhythm; No murmurs, rubs, or gallops  ABDOMEN: Soft, Nontender, Nondistended; Bowel sounds present  EXTREMITIES:  2+ Peripheral Pulses, No clubbing, cyanosis, or edema  LYMPH: No lymphadenopathy noted      LABS:                        10.9   15.1  )-----------( 332      ( 21 Oct 2017 07:48 )             34.2     21 Oct 2017 07:48    135    |  99     |  6      ----------------------------<  108    3.1     |  27     |  0.24     Ca    7.8        21 Oct 2017 07:48  Mg     1.7       21 Oct 2017 07:48        CAPILLARY BLOOD GLUCOSE        BLOOD CULTURE    RADIOLOGY & ADDITIONAL TESTS:    Imaging Personally Reviewed:  [ ] YES     Consultant(s) Notes Reviewed:      Care Discussed with Consultants/Other Providers:

## 2017-10-21 NOTE — PROGRESS NOTE ADULT - ATTENDING COMMENTS
Pain medications adjusted. Potassium repleted. PT/OOB as tolerated. Family still does not want her to go to [facility] and are aware of poor prognosis. D/W daughter at length at bedside.

## 2017-10-22 LAB
ANION GAP SERPL CALC-SCNC: 7 MMOL/L — SIGNIFICANT CHANGE UP (ref 5–17)
BUN SERPL-MCNC: 9 MG/DL — SIGNIFICANT CHANGE UP (ref 7–23)
CALCIUM SERPL-MCNC: 8.2 MG/DL — LOW (ref 8.5–10.1)
CHLORIDE SERPL-SCNC: 100 MMOL/L — SIGNIFICANT CHANGE UP (ref 96–108)
CO2 SERPL-SCNC: 30 MMOL/L — SIGNIFICANT CHANGE UP (ref 22–31)
CREAT SERPL-MCNC: 0.45 MG/DL — LOW (ref 0.5–1.3)
GLUCOSE SERPL-MCNC: 105 MG/DL — HIGH (ref 70–99)
HCT VFR BLD CALC: 34.6 % — SIGNIFICANT CHANGE UP (ref 34.5–45)
HGB BLD-MCNC: 10.8 G/DL — LOW (ref 11.5–15.5)
MCHC RBC-ENTMCNC: 27.7 PG — SIGNIFICANT CHANGE UP (ref 27–34)
MCHC RBC-ENTMCNC: 31.1 GM/DL — LOW (ref 32–36)
MCV RBC AUTO: 89 FL — SIGNIFICANT CHANGE UP (ref 80–100)
PLATELET # BLD AUTO: 281 K/UL — SIGNIFICANT CHANGE UP (ref 150–400)
POTASSIUM SERPL-MCNC: 4.9 MMOL/L — SIGNIFICANT CHANGE UP (ref 3.5–5.3)
POTASSIUM SERPL-SCNC: 4.9 MMOL/L — SIGNIFICANT CHANGE UP (ref 3.5–5.3)
RBC # BLD: 3.89 M/UL — SIGNIFICANT CHANGE UP (ref 3.8–5.2)
RBC # FLD: 17.2 % — HIGH (ref 10.3–14.5)
SODIUM SERPL-SCNC: 137 MMOL/L — SIGNIFICANT CHANGE UP (ref 135–145)
WBC # BLD: 11.7 K/UL — HIGH (ref 3.8–10.5)
WBC # FLD AUTO: 11.7 K/UL — HIGH (ref 3.8–10.5)

## 2017-10-22 PROCEDURE — 99233 SBSQ HOSP IP/OBS HIGH 50: CPT

## 2017-10-22 PROCEDURE — 93306 TTE W/DOPPLER COMPLETE: CPT | Mod: 26

## 2017-10-22 RX ORDER — SENNA PLUS 8.6 MG/1
2 TABLET ORAL
Qty: 0 | Refills: 0 | COMMUNITY
Start: 2017-10-22

## 2017-10-22 RX ORDER — POLYETHYLENE GLYCOL 3350 17 G/17G
17 POWDER, FOR SOLUTION ORAL
Qty: 0 | Refills: 0 | COMMUNITY
Start: 2017-10-22

## 2017-10-22 RX ORDER — DOCUSATE SODIUM 100 MG
1 CAPSULE ORAL
Qty: 0 | Refills: 0 | COMMUNITY
Start: 2017-10-22

## 2017-10-22 RX ORDER — MIRTAZAPINE 45 MG/1
1 TABLET, ORALLY DISINTEGRATING ORAL
Qty: 0 | Refills: 0 | COMMUNITY
Start: 2017-10-22

## 2017-10-22 RX ORDER — IBUPROFEN 200 MG
1 TABLET ORAL
Qty: 0 | Refills: 0 | COMMUNITY
Start: 2017-10-22

## 2017-10-22 RX ORDER — OXYCODONE HYDROCHLORIDE 5 MG/1
1 TABLET ORAL
Qty: 0 | Refills: 0 | COMMUNITY
Start: 2017-10-22

## 2017-10-22 RX ADMIN — ONDANSETRON 4 MILLIGRAM(S): 8 TABLET, FILM COATED ORAL at 08:56

## 2017-10-22 RX ADMIN — OXYCODONE HYDROCHLORIDE 5 MILLIGRAM(S): 5 TABLET ORAL at 10:00

## 2017-10-22 RX ADMIN — SERTRALINE 100 MILLIGRAM(S): 25 TABLET, FILM COATED ORAL at 13:18

## 2017-10-22 RX ADMIN — OXYCODONE HYDROCHLORIDE 5 MILLIGRAM(S): 5 TABLET ORAL at 15:04

## 2017-10-22 RX ADMIN — OXYCODONE HYDROCHLORIDE 5 MILLIGRAM(S): 5 TABLET ORAL at 08:56

## 2017-10-22 RX ADMIN — OXYCODONE HYDROCHLORIDE 5 MILLIGRAM(S): 5 TABLET ORAL at 21:40

## 2017-10-22 RX ADMIN — MIRTAZAPINE 7.5 MILLIGRAM(S): 45 TABLET, ORALLY DISINTEGRATING ORAL at 21:39

## 2017-10-22 RX ADMIN — ENOXAPARIN SODIUM 40 MILLIGRAM(S): 100 INJECTION SUBCUTANEOUS at 13:19

## 2017-10-22 RX ADMIN — ONDANSETRON 4 MILLIGRAM(S): 8 TABLET, FILM COATED ORAL at 20:46

## 2017-10-22 RX ADMIN — OXYCODONE HYDROCHLORIDE 5 MILLIGRAM(S): 5 TABLET ORAL at 22:40

## 2017-10-22 NOTE — PROGRESS NOTE ADULT - SUBJECTIVE AND OBJECTIVE BOX
Patient is a 71y old  Female who presents with a chief complaint of nausea, vomiting (20 Oct 2017 17:53)       INTERVAL HPI/ OVERNIGHT EVENTS: More awake and alert today. C/O back pain. Daughter at bedside, states that patient has not asked for any pain medications since last night but now is c/o back pain and wants to try Ibuprofen because that's what she takes at home if needed.     MEDICATIONS  (STANDING):  enoxaparin Injectable 40 milliGRAM(s) SubCutaneous every 24 hours  influenza   Vaccine 0.5 milliLiter(s) IntraMuscular once  levoFLOXacin  Tablet 500 milliGRAM(s) Oral every 24 hours  mirtazapine 7.5 milliGRAM(s) Oral at bedtime  sertraline 100 milliGRAM(s) Oral daily    MEDICATIONS  (PRN):  ALPRAZolam 0.5 milliGRAM(s) Oral two times a day PRN anxiety  docusate sodium 100 milliGRAM(s) Oral three times a day PRN Constipation  ibuprofen  Tablet 400 milliGRAM(s) Oral every 6 hours PRN Mild to moderate pain  ondansetron   Disintegrating Tablet 4 milliGRAM(s) Oral every 6 hours PRN Nausea and/or Vomiting  oxyCODONE    IR 5 milliGRAM(s) Oral every 6 hours PRN Severe Pain (7 - 10)  polyethylene glycol 3350 17 Gram(s) Oral daily PRN Constipation  senna 2 Tablet(s) Oral at bedtime PRN Constipation      Allergies    penicillin (Other)    Intolerances        REVIEW OF SYSTEMS:  CONSTITUTIONAL: No fever, weight loss, or fatigue  EYES: No eye pain, visual disturbances, or discharge  ENMT:  No difficulty hearing, tinnitus, vertigo; No sinus or throat pain  NECK: No pain or stiffness  BREASTS: No pain, masses, or nipple discharge  RESPIRATORY: No cough, wheezing, chills or hemoptysis; No shortness of breath  CARDIOVASCULAR: No chest pain, palpitations, dizziness, or leg swelling  GASTROINTESTINAL: No abdominal or epigastric pain. No nausea, vomiting, or hematemesis; No diarrhea or constipation. No melena or hematochezia.  GENITOURINARY: No dysuria, frequency, hematuria, or incontinence  NEUROLOGICAL: No headaches, memory loss, loss of strength, numbness, or tremors  SKIN: No itching, burning, rashes, or lesions   LYMPH NODES: No enlarged glands  ENDOCRINE: No heat or cold intolerance; No hair loss; No polydipsia or polyuria  MUSCULOSKELETAL: No joint pain or swelling; No muscle, back, or extremity pain  PSYCHIATRIC: No depression, anxiety, mood swings, or difficulty sleeping  HEME/LYMPH: No easy bruising, or bleeding gums  ALLERGY AND IMMUNOLOGIC: No hives or eczema    Vital Signs Last 24 Hrs  T(C): 36.4 (22 Oct 2017 05:52), Max: 36.6 (21 Oct 2017 14:44)  T(F): 97.5 (22 Oct 2017 05:52), Max: 97.9 (21 Oct 2017 14:44)  HR: 95 (22 Oct 2017 05:52) (90 - 96)  BP: 110/71 (22 Oct 2017 05:52) (90/59 - 117/74)  BP(mean): --  RR: 18 (22 Oct 2017 05:52) (17 - 18)  SpO2: 92% (22 Oct 2017 07:15) (85% - 92%)    PHYSICAL EXAM:  GENERAL: NAD, well-groomed, well-developed  HEAD:  Atraumatic, Normocephalic  EYES: EOMI, PERRLA, conjunctiva and sclera clear  ENMT: No tonsillar erythema, exudates, or enlargement; Moist mucous membranes, Good dentition, No lesions  NECK: Supple, No JVD, Normal thyroid  NERVOUS SYSTEM:  Alert & Oriented X3, Good concentration; Motor Strength 5/5 B/L upper and lower extremities; DTRs 2+ intact and symmetric  CHEST/LUNG: Clear to auscultation bilaterally; No rales, rhonchi, wheezing, or rubs  HEART: Regular rate and rhythm; No murmurs, rubs, or gallops  ABDOMEN: Soft, Nontender, Nondistended; Bowel sounds present  EXTREMITIES:  2+ Peripheral Pulses, No clubbing, cyanosis, or edema  LYMPH: No lymphadenopathy noted  SKIN: No rashes or lesions    LABS:                        10.8   11.7  )-----------( 281      ( 22 Oct 2017 06:40 )             34.6     22 Oct 2017 06:40    137    |  100    |  9      ----------------------------<  105    4.9     |  30     |  0.45     Ca    8.2        22 Oct 2017 06:40        CAPILLARY BLOOD GLUCOSE        BLOOD CULTURE    RADIOLOGY & ADDITIONAL TESTS:    Imaging Personally Reviewed:  [ ] YES     Consultant(s) Notes Reviewed:      Care Discussed with Consultants/Other Providers:

## 2017-10-22 NOTE — PROGRESS NOTE ADULT - SUBJECTIVE AND OBJECTIVE BOX
HPI:  70 yo female patient with pmh of colon cancer s/p resection, liver metastasis, anxiety, benign ovarian tumor, and HLD presenting with cc of nausea for 2 weeks and vomiting for the past 2 days. She started on her second course of chemotherapy on stivarga (started on 10/3 to end on 10/21). She went to see her oncologist today who told her to visit the ED for her symptoms and elevated WBC seen in the office. Vomiting is described as the the food that the patient eats and non bloody. Daughter states that patient gets abdominal discomfort at times that they attributed to dyspepsia since it was relieved by simethecone. Patient admits to abdominal pain located in the LUQ, stabbing in nature, non radiating rated as an 8/10 with no remitting or exacerbating factors. She is unsure if pain is related to food/bowel movements but states that she has decreased appetite and diarrhea. She admits to weakness and weight loss. Patient denies fever, sick contacts, recent travel, numbness, tingling, chest pain, sob, dizziness.  In the ED: BP 92/64 HR 91 RR 16 T 96.4 F SpO2 94% RA  WBC: 15.4  Neutrophils 89% Platelets 404 Lactate 3.9 Potassium 5.9 Alk Phos 151 Bili 0.2 Procal 0.41  Pro BNP 1623  CT chest/abdomen: Advanced emphysema. Multiple pulmonary parenchymal metastases. Hepatic metastases. Multistation metastatic adenopathy in the chest and abdomen. Small ascites. Small pericholecystic fluid nonspecific likely a secondary manifestation.   US GB: Layering sludge and/or stones within the lumen of the gallbladder wall with nonspecific gallbladder wall edema and/or thickening. consider HIDA  patient was given 2.8 L NS, 1 dose of levaquin (16 Oct 2017 21:13)      SUBJECTIVE:  Patient is a 71y old  Female who presents with a chief complaint of nausea, vomiting (20 Oct 2017 17:53)          OBJECTIVE:  Review Of Systems:  Constitutional: [ ] Fever [ ] Chills [ ] Fatigue [ ] Weight change   HEENT: [ ] Blurred vision [ ] Eye Pain [ ] Headache [ ] Runny nose [ ] Sore Throat   Respiratory: [ ] Cough [ ] Wheezing [ ] Shortness of breath  Cardiovascular: [ ] Chest Pain [ ] Palpitations [ ] BOWEN [ ] PND [ ] Orthopnea  Gastrointestinal: [ ] Abdominal Pain [ ] Diarrhea [ ] Constipation [ ] Hemorrhoids [ ] Nausea [ ] Vomiting  Genitourinary: [ ] Nocturia [ ] Dysuria [ ] Incontinence  Extremities: [ ] Swelling [ ] Joint Pain  Neurologic: [ ] Focal deficit [ ] Paresthesias [ ] Syncope  Lymphatic: [ ] Swelling [ ] Lymphadenopathy   Skin: [ ] Rash [ ] Ecchymoses [ ] Wounds [ ] Lesions  Psychiatry: [ ] Depression [ ] Suicidal/Homicidal Ideation [ ] Anxiety [ ] Sleep Disturbances  [ ] 10 point review of systems is otherwise negative except as mentioned above            [ ]Unable to obtain    Allergy:  Allergies    penicillin (Other)    Intolerances        Medications:  MEDICATIONS  (STANDING):  enoxaparin Injectable 40 milliGRAM(s) SubCutaneous every 24 hours  influenza   Vaccine 0.5 milliLiter(s) IntraMuscular once  mirtazapine 7.5 milliGRAM(s) Oral at bedtime  sertraline 100 milliGRAM(s) Oral daily    MEDICATIONS  (PRN):  ALPRAZolam 0.5 milliGRAM(s) Oral two times a day PRN anxiety  docusate sodium 100 milliGRAM(s) Oral three times a day PRN Constipation  ibuprofen  Tablet 400 milliGRAM(s) Oral every 6 hours PRN Mild to moderate pain  ondansetron   Disintegrating Tablet 4 milliGRAM(s) Oral every 6 hours PRN Nausea and/or Vomiting  oxyCODONE    IR 5 milliGRAM(s) Oral every 6 hours PRN Severe Pain (7 - 10)  polyethylene glycol 3350 17 Gram(s) Oral daily PRN Constipation  senna 2 Tablet(s) Oral at bedtime PRN Constipation      PMH/PSH/FH/SH: [ ] Unchanged    Vitals:  T(C): 36.4 (10-22-17 @ 05:52), Max: 36.6 (10-21-17 @ 14:44)  HR: 95 (10-22-17 @ 05:52) (90 - 96)  BP: 110/71 (10-22-17 @ 05:52) (90/59 - 117/74)  BP(mean): --  RR: 18 (10-22-17 @ 05:52) (17 - 18)  SpO2: 92% (10-22-17 @ 07:15) (85% - 92%)  Wt(kg): --  Daily     Daily   I&O's Summary    21 Oct 2017 07:01  -  22 Oct 2017 07:00  --------------------------------------------------------  IN: 240 mL / OUT: 400 mL / NET: -160 mL        Labs:                        10.8   11.7  )-----------( 281      ( 22 Oct 2017 06:40 )             34.6     10-22    137  |  100  |  9   ----------------------------<  105<H>  4.9   |  30  |  0.45<L>    Ca    8.2<L>      22 Oct 2017 06:40  Mg     1.7     10-21        CARDIAC MARKERS ( 20 Oct 2017 13:47 )  <.015 ng/mL / x     / 273 U/L / x     / x                      ECG:  < from: 12 Lead ECG (10.21.17 @ 08:06) >  Ventricular Rate 98 BPM    Atrial Rate 98 BPM    P-R Interval 110 ms    QRS Duration 88 ms    Q-T Interval 370 ms    QTC Calculation(Bezet) 472 ms    P Axis 45 degrees    R Axis 3 degrees    T Axis 47 degrees    Diagnosis Line Sinus rhythm with short [place]  T wave abnormality, consider anterior ischemia  Prolonged QT  Abnormal ECG  Confirmed by SHANNON HUNTER ([id number]) on 10/21/2017 1:59:37 PM    < end of copied text >  < from: 12 Lead ECG (10.20.17 @ 09:20) >  Ventricular Rate 80 BPM    Atrial Rate 80 BPM    P-R Interval 112 ms    QRS Duration 80 ms    Q-T Interval 416 ms    QTC Calculation(Bezet) 479 ms    P Axis 47 degrees    R Axis 4 degrees    T Axis 33 degrees    Diagnosis Line Sinus rhythm  ST & T wave abnormality, consider anterior ischemia  Prolonged QT    Confirmed by SHANNON HUNTER ([id number]) on 10/20/2017 11:54:09 AM    < end of copied text >    Echo:  Pending  Stress Testing:     Cath:    Imaging:  < from: NM Bone Imaging Total (10.19.17 @ 17:13) >    EXAM:  NM BONE IMG WHOLE BODY                            PROCEDURE DATE:  10/19/2017          INTERPRETATION:  CLINICAL INFORMATION: 71 year-old female with back pain   and metastatic colon carcinoma, referred for evaluation,       RADIOPHARMACEUTICAL: 20.0 mCi Tc-99m HDP, I.V.     TECHNIQUE:  Whole-body planar images were obtained in the anterior and   posterior projections approximately 2-3 hours after tracer injection.   Additional static views of the chest/ribs were obtained in the anterior,   posterior lateral projections.    COMPARISON: No previous bone scan for comparison.    FINDINGS: The study is limited by patient motion during imaging. There is   a focus of mildly increased tracer uptake in the region of the lower   sternum. There is an area of photopenia in the lower thoracic spine at   the level of T11-T12 vertebra. There is increased tracer uptake in the   right maxilla/nasal region, probably inflammation. There is physiologic   tracer distribution in the remainder of the visualized skeleton.    Both kidneys are visualized and appear symmetric.    IMPRESSION: Bone scan demonstrates:    Focal increased uptake in the lower sternum and photopenic defect at the   level of T11-T12 vertebra are nonspecific. Metastatic disease cannot be   excluded. Suggest correlation with prior diagnostic CT or MRI, if there   are no clinical contraindications.                      SHEILA YOUNGBLOOD M.D., NUCLEAR MEDICINE ATTENDING  This document has been electronically signed. Oct 19 2017  5:34PM        < end of copied text >  < from: NM Hepatobiliary Scan w/wo Gall Bladder (10.17.17 @ 12:53) >  EXAM:  NM HEPATOBILIARY IMG                            PROCEDURE DATE:  10/17/2017          INTERPRETATION:  RADIOPHARMACEUTICAL: 3.0 mCi Tc-99m-Mebrofenin, I.V.    CLINICAL STATEMENT: 71-year-old female with metastatic colon cancer to   the liver presents with nausea, vomiting, left upper quadrant discomfort   . Ultrasound from 10/16/2017 showed layering sludge and/or stones within   the lumen of the gallbladder wall with nonspecific gallbladder wall edema   and/or thickening. Patient is referred to evaluate for acute   cholecystitis.    TECHNIQUE:  Dynamic imaging of the anterior abdomen was performed for 35   minutes following injection of radiotracer. Static images of the abdomen   in the anterior, right lateral, right anterior oblique views were   obtained immediately thereafter. CT angiogram of the chest and CT of the   abdomen and pelvis from 10/16/2017 was reviewed.    FINDINGS: There is heterogeneous uptake of radiotracer by the   hepatocytes. There is a large photopenic defectin the right hepatic lobe   corresponding to the large low-attenuation density on CT. Activity is   first seen in the gallbladder at 15 minutes and in the bowel at 30   minutes. There is good clearance of activity from the liver by the end of   the study.    IMPRESSION: Hepatobiliary scan demonstrates:    No evidence of acute cholecystitis.    Large photopenic area in the right hepatic lobe corresponding to the   large low-attenuation density compatible with metastatic disease on CT   from 10/16/2017.                    RADHA LEE M.D., NUCLEAR MEDICINE ATTENDING  This document has been electronically signed. Oct 17 2017  1:10PM    < end of copied text >  < from: US Gallbladder (10.16.17 @ 18:25) >  EXAM:  US GALLBLADDER                            PROCEDURE DATE:  10/16/2017          INTERPRETATION:  .    CLINICAL INFORMATION: Right upper quadrant pain.    TECHNIQUE: A focused ultrasound examination of the right upper quadrant   was performed with grayscale and color imaging.    COMPARISON: Prior CT examination of the chest, abdomen, and pelvis from   earlier today    FINDINGS: Intraluminal layering sludge and/or stones are noted within the   gallbladder which are nonshadowing. There is mild nonspecific gallbladder   wall thickening and/or edema. A negative sonographic Hinson's sign is   noted at the time of examination. There is no pericholecystic fluid. The   common bile duct measures up to 0.6 cm in caliber and is borderline   dilated.    Previously noted metastatic lesions to the liver were better evaluated on   the prior CT examination from earlier today.    IMPRESSION: Layering sludge and/or stones within the lumen of the   gallbladder wall with nonspecific gallbladder wall edema and/or   thickening. If there remains clinical suspicion of acute cholecystitis,   consider further evaluation with HIDA scan.    Borderline dilated common bile duct. Correlate with LFTs.                  LYRIC STINSON M.D., ATTENDING RADIOLOGIST  This document has been electronically signed. Oct 16 2017  6:30PM        < end of copied text >  < from: CT Angio Chest w/ IV Cont (10.16.17 @ 16:29) >  EXAM:  CT ANGIO CHEST (W)AW IC                            PROCEDURE DATE:  10/16/2017          INTERPRETATION:  History: Sepsis, hypotension, antecedent history of   cancer.    CTA chest and contrast-enhanced abdominal CT.   95 cc Omnipaque 350 injected intravenously.  Axial images coronal sagittal reformats, MIP images.  Satisfactory contrast bolus. No pulmonary emboli.  Nonaneurysmal thoracic aorta.  Patent central airways. Mild pretracheal adenopathy with enlarged lymph   node measuring up to 1.8 cm. Posterior mediastinal adenopathy with lymph   nodes measuring up to 2 cm. Nodular thyroid. Recommend ultrasound   correlation.  Fairly advanced emphysematous change. Multiple bilateral widely   disseminated pulmonary parenchymal nodules consistent with metastases.   Dependent atelectatic changes at the lung bases.  Heart not grossly enlarged. Small pericardial effusion. No calcified   gallstones or biliary dilatation. Trace cholecystic fluid is nonspecific   likely related to ascites or hypoalbuminemia. Cholecystitis considered   less likely.  Liver is enlarged with multiple metastatic foci including partially   calcified lesion right lobe. Spleen slightly enlarged.  Bulky metastatic periportal, portacaval, retroperitoneal adenopathy.  Colonic diverticula no diverticulitis or other active bowel inflammation.   No bowel obstruction.  Small right upper quadrant and pelvic ascites. No organized fluid   collections. No extraluminal gas. Calcified uterine fibroids. A pessary   noted in the pelvis. Bladder not remarkable.  Nonspecific sclerotic focus involving upper thoracic vertebral body   probable bone island. An early blastic lesion less likely.    Impression:    No pulmonary emboli.  Advanced emphysema.  Multiple pulmonary parenchymal metastases.  Hepatic metastases.  Multistation metastatic adenopathy in the chest and abdomen as described.  Small ascites.  Small pericholecystic fluid nonspecific likely a secondary manifestation.   If there is suspicion for cholecystitis HIDA scan might be considered.                    CHRISTINA SALGADO M.D., ATTENDING RADIOLOGIST  This document has been electronically signed. Oct 16 2017  4:48PM        < end of copied text >    Interpretation of Telemetry:      Physical Exam:  Appearance: [ ] Normal  [ ] abnormal [ ] NAD   Eyes: [ ] PERRL [ ] EOMI  HENT: [ ] Normal [ ] Abnormal oral muscosa [ ]NC/AT  Cardiovascular: [ ] S1 [ ] S2 [ ] RRR [ ] m/r/g [ ]edema [ ] JVP  Procedural Access Site: [ ]  hematoma [ ] tender to palpation [ ] 2+ pulse [ ] bruit [ ] Ecchymosis  Respiratory: [ ] Clear to auscultation bilaterally  Gastrointestinal: [ ] Soft [ ] tenderness[ ] distension [ ] BS  Musculoskeletal: [ ] clubbing [ ] joint deformity   Neurologic: [ ] Non-focal  Lymphatic: [ ] lymphadenopathy  Psychiatry: [ ] AAOx3  [ ] confused [ ] disoriented [ ] Mood & affect appropriate  Skin: [ ]  rashes [ ] ecchymoses [ ] cyanosis HPI:  72 yo female patient with pmh of colon cancer s/p resection, liver metastasis, anxiety, benign ovarian tumor, and HLD presenting with cc of nausea for 2 weeks and vomiting for the past 2 days. She started on her second course of chemotherapy on stivarga (started on 10/3 to end on 10/21). She went to see her oncologist today who told her to visit the ED for her symptoms and elevated WBC seen in the office. Vomiting is described as the the food that the patient eats and non bloody. Daughter states that patient gets abdominal discomfort at times that they attributed to dyspepsia since it was relieved by simethecone. Patient admits to abdominal pain located in the LUQ, stabbing in nature, non radiating rated as an 8/10 with no remitting or exacerbating factors. She is unsure if pain is related to food/bowel movements but states that she has decreased appetite and diarrhea. She admits to weakness and weight loss. Patient denies fever, sick contacts, recent travel, numbness, tingling, chest pain, sob, dizziness.  In the ED: BP 92/64 HR 91 RR 16 T 96.4 F SpO2 94% RA  WBC: 15.4  Neutrophils 89% Platelets 404 Lactate 3.9 Potassium 5.9 Alk Phos 151 Bili 0.2 Procal 0.41  Pro BNP 1623  CT chest/abdomen: Advanced emphysema. Multiple pulmonary parenchymal metastases. Hepatic metastases. Multistation metastatic adenopathy in the chest and abdomen. Small ascites. Small pericholecystic fluid nonspecific likely a secondary manifestation.   US GB: Layering sludge and/or stones within the lumen of the gallbladder wall with nonspecific gallbladder wall edema and/or thickening. consider HIDA  patient was given 2.8 L NS, 1 dose of levaquin (16 Oct 2017 21:13)      SUBJECTIVE:  Patient is a 71y old  Female who presents with a chief complaint of nausea, vomiting (20 Oct 2017 17:53)          OBJECTIVE:  Review Of Systems:  Constitutional: [ ] Fever [ ] Chills [ ] Fatigue [ ] Weight change   HEENT: [ ] Blurred vision [ ] Eye Pain [ ] Headache [ ] Runny nose [ ] Sore Throat   Respiratory: [ ] Cough [ ] Wheezing [ ] Shortness of breath  Cardiovascular: [ ] Chest Pain [ ] Palpitations [ ] BOWEN [ ] PND [ ] Orthopnea  Gastrointestinal: [ ] Abdominal Pain [ ] Diarrhea [ ] Constipation [ ] Hemorrhoids [ ] Nausea [ ] Vomiting  Genitourinary: [ ] Nocturia [ ] Dysuria [ ] Incontinence  Extremities: [ ] Swelling [ ] Joint Pain  Neurologic: [ ] Focal deficit [ ] Paresthesias [ ] Syncope  Lymphatic: [ ] Swelling [ ] Lymphadenopathy   Skin: [ ] Rash [ ] Ecchymoses [ ] Wounds [ ] Lesions  Psychiatry: [ ] Depression [ ] Suicidal/Homicidal Ideation [ ] Anxiety [ ] Sleep Disturbances  [ ] 10 point review of systems is otherwise negative except as mentioned above            [ ]Unable to obtain    Allergy:  Allergies    penicillin (Other)    Intolerances        Medications:  MEDICATIONS  (STANDING):  enoxaparin Injectable 40 milliGRAM(s) SubCutaneous every 24 hours  influenza   Vaccine 0.5 milliLiter(s) IntraMuscular once  mirtazapine 7.5 milliGRAM(s) Oral at bedtime  sertraline 100 milliGRAM(s) Oral daily    MEDICATIONS  (PRN):  ALPRAZolam 0.5 milliGRAM(s) Oral two times a day PRN anxiety  docusate sodium 100 milliGRAM(s) Oral three times a day PRN Constipation  ibuprofen  Tablet 400 milliGRAM(s) Oral every 6 hours PRN Mild to moderate pain  ondansetron   Disintegrating Tablet 4 milliGRAM(s) Oral every 6 hours PRN Nausea and/or Vomiting  oxyCODONE    IR 5 milliGRAM(s) Oral every 6 hours PRN Severe Pain (7 - 10)  polyethylene glycol 3350 17 Gram(s) Oral daily PRN Constipation  senna 2 Tablet(s) Oral at bedtime PRN Constipation      PMH/PSH/FH/SH: [ ] Unchanged    Vitals:  T(C): 36.4 (10-22-17 @ 05:52), Max: 36.6 (10-21-17 @ 14:44)  HR: 95 (10-22-17 @ 05:52) (90 - 96)  BP: 110/71 (10-22-17 @ 05:52) (90/59 - 117/74)  BP(mean): --  RR: 18 (10-22-17 @ 05:52) (17 - 18)  SpO2: 92% (10-22-17 @ 07:15) (85% - 92%)  Wt(kg): --  Daily     Daily   I&O's Summary    21 Oct 2017 07:01  -  22 Oct 2017 07:00  --------------------------------------------------------  IN: 240 mL / OUT: 400 mL / NET: -160 mL        Labs:                        10.8   11.7  )-----------( 281      ( 22 Oct 2017 06:40 )             34.6     10-22    137  |  100  |  9   ----------------------------<  105<H>  4.9   |  30  |  0.45<L>    Ca    8.2<L>      22 Oct 2017 06:40  Mg     1.7     10-21        CARDIAC MARKERS ( 20 Oct 2017 13:47 )  <.015 ng/mL / x     / 273 U/L / x     / x                      ECG:  < from: 12 Lead ECG (10.21.17 @ 08:06) >  Ventricular Rate 98 BPM    Atrial Rate 98 BPM    P-R Interval 110 ms    QRS Duration 88 ms    Q-T Interval 370 ms    QTC Calculation(Bezet) 472 ms    P Axis 45 degrees    R Axis 3 degrees    T Axis 47 degrees    Diagnosis Line Sinus rhythm with short [place]  T wave abnormality, consider anterior ischemia  Prolonged QT  Abnormal ECG  Confirmed by SHANNON HUNTER ([id number]) on 10/21/2017 1:59:37 PM    < end of copied text >  < from: 12 Lead ECG (10.20.17 @ 09:20) >  Ventricular Rate 80 BPM    Atrial Rate 80 BPM    P-R Interval 112 ms    QRS Duration 80 ms    Q-T Interval 416 ms    QTC Calculation(Bezet) 479 ms    P Axis 47 degrees    R Axis 4 degrees    T Axis 33 degrees    Diagnosis Line Sinus rhythm  ST & T wave abnormality, consider anterior ischemia  Prolonged QT    Confirmed by SHANNON HUNTER ([id number]) on 10/20/2017 11:54:09 AM    < end of copied text >    Echo:  Pending  Stress Testing:     Cath:    Imaging:  < from: NM Bone Imaging Total (10.19.17 @ 17:13) >    EXAM:  NM BONE IMG WHOLE BODY                            PROCEDURE DATE:  10/19/2017          INTERPRETATION:  CLINICAL INFORMATION: 71 year-old female with back pain   and metastatic colon carcinoma, referred for evaluation,       RADIOPHARMACEUTICAL: 20.0 mCi Tc-99m HDP, I.V.     TECHNIQUE:  Whole-body planar images were obtained in the anterior and   posterior projections approximately 2-3 hours after tracer injection.   Additional static views of the chest/ribs were obtained in the anterior,   posterior lateral projections.    COMPARISON: No previous bone scan for comparison.    FINDINGS: The study is limited by patient motion during imaging. There is   a focus of mildly increased tracer uptake in the region of the lower   sternum. There is an area of photopenia in the lower thoracic spine at   the level of T11-T12 vertebra. There is increased tracer uptake in the   right maxilla/nasal region, probably inflammation. There is physiologic   tracer distribution in the remainder of the visualized skeleton.    Both kidneys are visualized and appear symmetric.    IMPRESSION: Bone scan demonstrates:    Focal increased uptake in the lower sternum and photopenic defect at the   level of T11-T12 vertebra are nonspecific. Metastatic disease cannot be   excluded. Suggest correlation with prior diagnostic CT or MRI, if there   are no clinical contraindications.                      SHEILA YOUNGBLOOD M.D., NUCLEAR MEDICINE ATTENDING  This document has been electronically signed. Oct 19 2017  5:34PM        < end of copied text >  < from: NM Hepatobiliary Scan w/wo Gall Bladder (10.17.17 @ 12:53) >  EXAM:  NM HEPATOBILIARY IMG                            PROCEDURE DATE:  10/17/2017          INTERPRETATION:  RADIOPHARMACEUTICAL: 3.0 mCi Tc-99m-Mebrofenin, I.V.    CLINICAL STATEMENT: 71-year-old female with metastatic colon cancer to   the liver presents with nausea, vomiting, left upper quadrant discomfort   . Ultrasound from 10/16/2017 showed layering sludge and/or stones within   the lumen of the gallbladder wall with nonspecific gallbladder wall edema   and/or thickening. Patient is referred to evaluate for acute   cholecystitis.    TECHNIQUE:  Dynamic imaging of the anterior abdomen was performed for 35   minutes following injection of radiotracer. Static images of the abdomen   in the anterior, right lateral, right anterior oblique views were   obtained immediately thereafter. CT angiogram of the chest and CT of the   abdomen and pelvis from 10/16/2017 was reviewed.    FINDINGS: There is heterogeneous uptake of radiotracer by the   hepatocytes. There is a large photopenic defectin the right hepatic lobe   corresponding to the large low-attenuation density on CT. Activity is   first seen in the gallbladder at 15 minutes and in the bowel at 30   minutes. There is good clearance of activity from the liver by the end of   the study.    IMPRESSION: Hepatobiliary scan demonstrates:    No evidence of acute cholecystitis.    Large photopenic area in the right hepatic lobe corresponding to the   large low-attenuation density compatible with metastatic disease on CT   from 10/16/2017.                    RADHA LEE M.D., NUCLEAR MEDICINE ATTENDING  This document has been electronically signed. Oct 17 2017  1:10PM    < end of copied text >  < from: US Gallbladder (10.16.17 @ 18:25) >  EXAM:  US GALLBLADDER                            PROCEDURE DATE:  10/16/2017          INTERPRETATION:  .    CLINICAL INFORMATION: Right upper quadrant pain.    TECHNIQUE: A focused ultrasound examination of the right upper quadrant   was performed with grayscale and color imaging.    COMPARISON: Prior CT examination of the chest, abdomen, and pelvis from   earlier today    FINDINGS: Intraluminal layering sludge and/or stones are noted within the   gallbladder which are nonshadowing. There is mild nonspecific gallbladder   wall thickening and/or edema. A negative sonographic Hinson's sign is   noted at the time of examination. There is no pericholecystic fluid. The   common bile duct measures up to 0.6 cm in caliber and is borderline   dilated.    Previously noted metastatic lesions to the liver were better evaluated on   the prior CT examination from earlier today.    IMPRESSION: Layering sludge and/or stones within the lumen of the   gallbladder wall with nonspecific gallbladder wall edema and/or   thickening. If there remains clinical suspicion of acute cholecystitis,   consider further evaluation with HIDA scan.    Borderline dilated common bile duct. Correlate with LFTs.                  LYRIC STINSON M.D., ATTENDING RADIOLOGIST  This document has been electronically signed. Oct 16 2017  6:30PM        < end of copied text >  < from: CT Angio Chest w/ IV Cont (10.16.17 @ 16:29) >  EXAM:  CT ANGIO CHEST (W)AW IC                            PROCEDURE DATE:  10/16/2017          INTERPRETATION:  History: Sepsis, hypotension, antecedent history of   cancer.    CTA chest and contrast-enhanced abdominal CT.   95 cc Omnipaque 350 injected intravenously.  Axial images coronal sagittal reformats, MIP images.  Satisfactory contrast bolus. No pulmonary emboli.  Nonaneurysmal thoracic aorta.  Patent central airways. Mild pretracheal adenopathy with enlarged lymph   node measuring up to 1.8 cm. Posterior mediastinal adenopathy with lymph   nodes measuring up to 2 cm. Nodular thyroid. Recommend ultrasound   correlation.  Fairly advanced emphysematous change. Multiple bilateral widely   disseminated pulmonary parenchymal nodules consistent with metastases.   Dependent atelectatic changes at the lung bases.  Heart not grossly enlarged. Small pericardial effusion. No calcified   gallstones or biliary dilatation. Trace cholecystic fluid is nonspecific   likely related to ascites or hypoalbuminemia. Cholecystitis considered   less likely.  Liver is enlarged with multiple metastatic foci including partially   calcified lesion right lobe. Spleen slightly enlarged.  Bulky metastatic periportal, portacaval, retroperitoneal adenopathy.  Colonic diverticula no diverticulitis or other active bowel inflammation.   No bowel obstruction.  Small right upper quadrant and pelvic ascites. No organized fluid   collections. No extraluminal gas. Calcified uterine fibroids. A pessary   noted in the pelvis. Bladder not remarkable.  Nonspecific sclerotic focus involving upper thoracic vertebral body   probable bone island. An early blastic lesion less likely.    Impression:    No pulmonary emboli.  Advanced emphysema.  Multiple pulmonary parenchymal metastases.  Hepatic metastases.  Multistation metastatic adenopathy in the chest and abdomen as described.  Small ascites.  Small pericholecystic fluid nonspecific likely a secondary manifestation.   If there is suspicion for cholecystitis HIDA scan might be considered.                    CHRISTINA SALGADO M.D., ATTENDING RADIOLOGIST  This document has been electronically signed. Oct 16 2017  4:48PM        < end of copied text >    Interpretation of Telemetry:      Physical Exam:  Appearance: [ ] Normal  [ ] abnormal [x ] NAD attempting to sleep just took oxycodone  Eyes: [x ] PERRL [ x] EOMI  HENT: [x ] Normal [ ] Abnormal oral muscosa [x ]NC/AT  Cardiovascular: [x ] S1 [x ] S2 [x ] RRR [ ] m/r/g [ ]edema [ ] JVP  Procedural Access Site: [ ]  hematoma [ ] tender to palpation [ ] 2+ pulse [ ] bruit [ ] Ecchymosis  Respiratory: [x ] No rales, wheezing or rhonchi noted with decreased b/s b/l  Gastrointestinal: [ ] Soft [ ] tenderness[ ] distension [ ] BS  Musculoskeletal: [ ] clubbing [ ] joint deformity   Neurologic: [x ] Non-focal  Lymphatic: [ ] lymphadenopathy  Psychiatry: [ x] AAOx3  [ ] confused [ ] disoriented [ x] Mood & affect appropriate  Skin: [ ]  rashes [ ] ecchymoses [ ] cyanosis

## 2017-10-22 NOTE — PROGRESS NOTE ADULT - ASSESSMENT
70 yo female patient with pmh of Colon cancer s/p resection @2011 , Liver metastasis s/p biopsy 04/15 Stivarga, Anxiety, Benign ovarian tumor s/p oophorectomy, HTN and HLD presenting with cc of nausea for 2 weeks and vomiting for the past 2 days. She started on her second course of chemotherapy on stivarga (started on 10/3 to end on 10/21). She went to see her oncologist today who told her to visit the ED for her symptoms and elevated WBC seen in the office. Vomiting is described as the food that the patient eats and non bloody and admitted for sepsis due to suspected acute cholecystitis and midback pain. HIDA scan negative and urine culture growing E.Coli.

## 2017-10-22 NOTE — PROGRESS NOTE ADULT - ASSESSMENT
This is a 72 y/o F with HLD, anxiety, and colon Ca s/p resection with liver mets on chemo presented to the ED c/o nausea x 2 weeks and vomiting x 2 days PTA.  Called for consult due to prolonged QT.    - QT improving on daily EKGs  - Telemetry d/c'd pt with documented HR 78-96   - No evidence of ischemia or arrhythmia on the EKG  - Patient is currently on Levaquin and Zofran which are known to prolong QT interval.  QT is stable  - Daily EKG.   - Monitor electrolytes; replete to keep K>4 and Mag>2  - Further cardiac workup as case unfolds  - Will continue to follow    Andie Rodriguez NP-STU  Cardiology This is a 72 y/o F with HLD, anxiety, and colon Ca s/p resection with liver mets on chemo presented to the ED c/o nausea x 2 weeks and vomiting x 2 days PTA.  Called for consult due to prolonged QT.    - QT improving on daily EKGs  - Telemetry d/c'd pt with documented HR 78-96   - No evidence of ischemia or arrhythmia on the EKG  - Patient is currently on Levaquin and Zofran which are known to prolong QT interval.  QT is stable  - Daily EKG.   - check echo and EKG today  - Monitor electrolytes; Mag 1.7 repleted will check pending level; replete to keep K>4 and Mag>2   - Further cardiac workup as case unfolds  - Will continue to follow    Andie Rodriguez NP-C  Cardiology This is a 70 y/o F with HLD, anxiety, and colon Ca s/p resection with liver mets on chemo presented to the ED c/o nausea x 2 weeks and vomiting x 2 days PTA.  Called for consult due to prolonged QT.    - QT improving on daily EKGs currentl QTc today 451.    - Telemetry d/c'd pt with documented HR 78-96   - No evidence of ischemia or arrhythmia on the EKG  - Patient is currently on Levaquin and Zofran which are known to prolong QT interval.  QT is stable  - Daily EKG.   - check echo   - Monitor electrolytes; Mag 1.9 today; replete to keep K>4 and Mag>2   - Further cardiac workup as case unfolds  - Will continue to follow    Andie Rodriguez NP-STU  Cardiology This is a 72 y/o F with HLD, anxiety, and colon Ca s/p resection with liver mets on chemo presented to the ED c/o nausea x 2 weeks and vomiting x 2 days PTA.  Called for consult due to prolonged QT.    - QT improving on daily EKGs current QTc today 451.    - Telemetry d/c'd pt with documented HR 78-96   - No evidence of ischemia or arrhythmia on the EKG  - Patient is currently on Levaquin and Zofran which are known to prolong QT interval.  QT is stable  - Daily EKG.   - check echo   - Monitor electrolytes; Mag 1.9 today; replete to keep K>4 and Mag>2   - Further cardiac workup as case unfolds  - Will continue to follow    Andie Rodriguez NP-C  Cardiology This is a 70 y/o F with HLD, anxiety, and colon Ca s/p resection with liver mets on chemo presented to the ED c/o nausea x 2 weeks and vomiting x 2 days PTA.  Called for consult due to prolonged QT.    - QT improving on daily EKGs current QTc today 451.    - Telemetry d/c'd pt with documented HR 78-96   - No evidence of ischemia or arrhythmia on the EKG  - EKG with anterior T wave inversions that appear new.  Follow up 2D echo to assess LV function.  - Continue to get Daily EKG.   - Monitor electrolytes; Mag 1.9 today; replete to keep K>4 and Mag>2   - Further cardiac workup as case unfolds  - Will continue to follow    Andie Rodriguez NP-C  Cardiology

## 2017-10-22 NOTE — PROGRESS NOTE ADULT - PROBLEM SELECTOR PLAN 2
Resolved. Trial of oral Zofran prior to meal and prn. HIDA scan negative. Most likely due to mets to liver. Advance diet as tolerate.

## 2017-10-22 NOTE — PROGRESS NOTE ADULT - PROBLEM SELECTOR PLAN 1
Possibly due to UTI cause by E.Coli.  as HIDA scan negative. IV Levaquin day #7.   - Will stop antibiotics today, completes total of 7 days.

## 2017-10-23 VITALS — OXYGEN SATURATION: 94 % | HEART RATE: 94 BPM

## 2017-10-23 LAB
ANION GAP SERPL CALC-SCNC: 8 MMOL/L — SIGNIFICANT CHANGE UP (ref 5–17)
BUN SERPL-MCNC: 8 MG/DL — SIGNIFICANT CHANGE UP (ref 7–23)
CALCIUM SERPL-MCNC: 8.1 MG/DL — LOW (ref 8.5–10.1)
CHLORIDE SERPL-SCNC: 97 MMOL/L — SIGNIFICANT CHANGE UP (ref 96–108)
CO2 SERPL-SCNC: 31 MMOL/L — SIGNIFICANT CHANGE UP (ref 22–31)
CREAT SERPL-MCNC: 0.29 MG/DL — LOW (ref 0.5–1.3)
GLUCOSE SERPL-MCNC: 113 MG/DL — HIGH (ref 70–99)
POTASSIUM SERPL-MCNC: 3.9 MMOL/L — SIGNIFICANT CHANGE UP (ref 3.5–5.3)
POTASSIUM SERPL-SCNC: 3.9 MMOL/L — SIGNIFICANT CHANGE UP (ref 3.5–5.3)
SODIUM SERPL-SCNC: 136 MMOL/L — SIGNIFICANT CHANGE UP (ref 135–145)

## 2017-10-23 PROCEDURE — 99232 SBSQ HOSP IP/OBS MODERATE 35: CPT

## 2017-10-23 PROCEDURE — 99239 HOSP IP/OBS DSCHRG MGMT >30: CPT

## 2017-10-23 RX ORDER — ONDANSETRON 8 MG/1
1 TABLET, FILM COATED ORAL
Qty: 12 | Refills: 0 | OUTPATIENT
Start: 2017-10-23 | End: 2017-10-26

## 2017-10-23 RX ADMIN — OXYCODONE HYDROCHLORIDE 5 MILLIGRAM(S): 5 TABLET ORAL at 12:22

## 2017-10-23 RX ADMIN — OXYCODONE HYDROCHLORIDE 5 MILLIGRAM(S): 5 TABLET ORAL at 07:30

## 2017-10-23 RX ADMIN — OXYCODONE HYDROCHLORIDE 5 MILLIGRAM(S): 5 TABLET ORAL at 12:18

## 2017-10-23 RX ADMIN — OXYCODONE HYDROCHLORIDE 5 MILLIGRAM(S): 5 TABLET ORAL at 06:35

## 2017-10-23 RX ADMIN — ONDANSETRON 4 MILLIGRAM(S): 8 TABLET, FILM COATED ORAL at 12:18

## 2017-10-23 RX ADMIN — ONDANSETRON 4 MILLIGRAM(S): 8 TABLET, FILM COATED ORAL at 06:36

## 2017-10-23 RX ADMIN — ENOXAPARIN SODIUM 40 MILLIGRAM(S): 100 INJECTION SUBCUTANEOUS at 12:00

## 2017-10-23 RX ADMIN — SERTRALINE 100 MILLIGRAM(S): 25 TABLET, FILM COATED ORAL at 12:17

## 2017-10-23 NOTE — PROGRESS NOTE ADULT - PROBLEM SELECTOR PLAN 7
[facility]    [address] [place] [address]    Phone:  [phone]    Fax:  [phone]       Thank You for choosing us for your health care visit. We are glad to serve you and happy to provide you with this summary of your visit. Please help us to ensure we have accurate records. If you find anything that needs to be changed, please let our staff know as soon as possible.          Your Demographic Information     Patient Name Sex Charmaine Calero Female 1945       Ethnic Group Patient Race    Not of  or  Origin White      Your Visit Details     Date & Time Provider Department    2017 3:00 PM Parth Kuhn DO [facility]      Your Upcoming Appointment*(Max 10)     2017  3:30 PM CDT   Office Visit with Parth Kuhn DO   [facility] ([facility])    [address] [place] [address]   [phone]            Tuesday October 10, 2017  8:30 AM CDT   Medicare Well Visit with Parth Kuhn DO   [facility] ([facility])    [address] [place] [address]   [phone]              Your Vitals Were     BP Pulse Temp Resp Height Weight    154/86 (BP Location: E, Patient Position: Sitting, Cuff Size: Regular) 88 98.2 °F (36.8 °C) (Oral) 20 5' 6\" (1.676 m) 174 lb 12.8 oz (79.3 kg)    BMI Smoking Status                28.21 kg/m2 Never Smoker          Medications Prescribed or Re-Ordered Today     None      Your Current Medications Are        Disp Refills Start End    atorvastatin (LIPITOR) 40 MG tablet 90 tablet 0 2017     Sig: Take 1 tablet by mouth  daily    Class: Eprescribe    levothyroxine (SYNTHROID, LEVOTHROID) 125 MCG tablet 90 tablet 0 12/15/2016     Sig: Take 1 tablet by mouth  daily     Class: Eprescribe    CALCIUM PO        Sig - Route: Take 1 tablet by mouth daily.   - Oral    Class: Historical Med    aspirin 81 MG tablet        Sig - Route: Take 81 mg by mouth daily.   - Oral    Class: Historical Med    Cholecalciferol (VITAMIN D PO)        Sig - Route: Take 1 tablet by mouth daily.   - Oral    Class: Historical Med      Discontinued Medications        Reason for Discontinue    diclofenac (VOLTAREN) 75 MG EC tablet Therapy Completed      Allergies     Latex RASH      Immunizations History as of 2/14/2017     Name Date    Influenza 11/10/2015, 10/8/2013, 10/8/2013, 10/19/2007, 11/16/2006, 11/15/2005, 10/28/2003, 1/7/2003    Influenza Quadrivalent Preservative Free 10/3/2016  1:53 PM    Pneumococcal Conjugate 13 Valent 9/23/2015  9:34 AM    Pneumococcal Polysaccharide Adult 11/13/2013      Problem List as of 2/14/2017     Subserous leiomyoma of uterus    Hyperlipidemia    HTN (hypertension)    Hypothyroidism    Phyllodes tumor    Right breast UIQ malignant phyllodes tumor            Patient Instructions     None       CT scan consistent with progression as per discussion with oncologist and daughter. Daughter will bring a copy or CD of CT done recently outpatient to compare. A copy of CT results provided. Discussed goals of care and advance directives. Daughter is the HCP and mentioned patient's known wishes of DNR/DNI if no hope. Palliative care to see.

## 2017-10-23 NOTE — PROGRESS NOTE ADULT - PROBLEM SELECTOR PROBLEM 3
Chronic pain
Chronic pain
Hypotension

## 2017-10-23 NOTE — PROGRESS NOTE ADULT - ASSESSMENT
This is a 70 y/o F with HLD, anxiety, and colon Ca s/p resection with liver mets on chemo presented to the ED c/o nausea x 2 weeks and vomiting x 2 days PTA.  Called for consult due to prolonged QT.    - QT improved  - Telemetry d/c'd pt with documented HR 78-96   - No evidence of ischemia or arrhythmia on the EKG  - EKG with anterior T wave inversions that appear new.  prelim TTE with normal LV function.   - Monitor and replete electrolytes. Keep K>4.0 and Mg>2.0.   - Will continue to follow  DC planning per primary team.

## 2017-10-23 NOTE — PROGRESS NOTE ADULT - PROBLEM SELECTOR PLAN 1
Possibly due to UTI cause by E.Coli.  as HIDA scan negative. IV Levaquin completed.   - pt no longer on Abx

## 2017-10-23 NOTE — PROGRESS NOTE ADULT - SUBJECTIVE AND OBJECTIVE BOX
Patient is a 71y old Female who presents with a chief complaint of nausea, vomiting (20 Oct 2017 17:53)     INTERVAL HPI/ OVERNIGHT EVENTS: More awake and alert today. C/O back pain. Daughter at bedside, states that patient has not asked for any pain medications since last night but now is c/o back pain and wants to try Ibuprofen because that's what she takes at home if needed.     MEDICATIONS  (STANDING):  enoxaparin Injectable 40 milliGRAM(s) SubCutaneous every 24 hours  influenza   Vaccine 0.5 milliLiter(s) IntraMuscular once  levoFLOXacin  Tablet 500 milliGRAM(s) Oral every 24 hours  mirtazapine 7.5 milliGRAM(s) Oral at bedtime  sertraline 100 milliGRAM(s) Oral daily    MEDICATIONS  (PRN):  ALPRAZolam 0.5 milliGRAM(s) Oral two times a day PRN anxiety  docusate sodium 100 milliGRAM(s) Oral three times a day PRN Constipation  ibuprofen  Tablet 400 milliGRAM(s) Oral every 6 hours PRN Mild to moderate pain  ondansetron   Disintegrating Tablet 4 milliGRAM(s) Oral every 6 hours PRN Nausea and/or Vomiting  oxyCODONE    IR 5 milliGRAM(s) Oral every 6 hours PRN Severe Pain (7 - 10)  polyethylene glycol 3350 17 Gram(s) Oral daily PRN Constipation  senna 2 Tablet(s) Oral at bedtime PRN Constipation    Allergies  Penicillin (Other)    REVIEW OF SYSTEMS:  CONSTITUTIONAL: No fever, weight loss, or fatigue  EYES: No eye pain, visual disturbances, or discharge  ENMT:  No difficulty hearing, tinnitus, vertigo; No sinus or throat pain  NECK: No pain or stiffness  BREASTS: No pain, masses, or nipple discharge  RESPIRATORY: No cough, wheezing, chills or hemoptysis; No shortness of breath  CARDIOVASCULAR: No chest pain, palpitations, dizziness, or leg swelling  GASTROINTESTINAL: No abdominal or epigastric pain. No nausea, vomiting, or hematemesis; No diarrhea or constipation. No melena or hematochezia.  GENITOURINARY: No dysuria, frequency, hematuria, or incontinence  NEUROLOGICAL: No headaches, memory loss, loss of strength, numbness, or tremors  SKIN: No itching, burning, rashes, or lesions   LYMPH NODES: No enlarged glands  ENDOCRINE: No heat or cold intolerance; No hair loss; No polydipsia or polyuria  MUSCULOSKELETAL: No joint pain or swelling; No muscle, back, or extremity pain  PSYCHIATRIC: No depression, anxiety, mood swings, or difficulty sleeping  HEME/LYMPH: No easy bruising, or bleeding gums  ALLERGY AND IMMUNOLOGIC: No hives or eczema    Vital Signs Last 24 Hrs  T(C): 36.9 (23 Oct 2017 06:09), Max: 36.9 (23 Oct 2017 06:09)  T(F): 98.5 (23 Oct 2017 06:09), Max: 98.5 (23 Oct 2017 06:09)  HR: 96 (23 Oct 2017 06:09) (96 - 107)  BP: 111/63 (23 Oct 2017 06:09) (111/63 - 119/76)  RR: 18 (23 Oct 2017 06:09) (18 - 18)  SpO2: 92% (23 Oct 2017 06:09) (92% - 92%)    PHYSICAL EXAM:  GENERAL: NAD, well-groomed, well-developed  HEAD:  Atraumatic, Normocephalic  EYES: EOMI, PERRLA, conjunctiva and sclera clear  ENMT: No tonsillar erythema, exudates, or enlargement; Moist mucous membranes, Good dentition, No lesions  NECK: Supple, No JVD, Normal thyroid  NERVOUS SYSTEM:  Alert & Oriented X3, Good concentration; Motor Strength 5/5 B/L upper and lower extremities; DTRs 2+ intact and symmetric  CHEST/LUNG: Clear to auscultation bilaterally; No rales, rhonchi, wheezing, or rubs  HEART: Regular rate and rhythm; No murmurs, rubs, or gallops  ABDOMEN: Soft, Nontender, Nondistended; Bowel sounds present  EXTREMITIES:  2+ Peripheral Pulses, No clubbing, cyanosis, or edema  LYMPH: No lymphadenopathy noted  SKIN: No rashes or lesions    LABS:             10-23    136  |  97  |  8   ----------------------------<  113<H>  3.9   |  31  |  0.29<L>    Ca    8.1<L>      23 Oct 2017 07:53  Mg     1.9     10-22                          10.8   11.7  )-----------( 281      ( 22 Oct 2017 06:40 )             34.6       CAPILLARY BLOOD GLUCOSE    BLOOD CULTURE    RADIOLOGY & ADDITIONAL TESTS:    Imaging Personally Reviewed:  [ ] YES     Consultant(s) Notes Reviewed:      Care Discussed with Consultants/Other Providers:

## 2017-10-23 NOTE — PROGRESS NOTE ADULT - PROBLEM SELECTOR PROBLEM 7
Liver metastases
R/O CHF (congestive heart failure)

## 2017-10-23 NOTE — PROGRESS NOTE ADULT - PROBLEM SELECTOR PROBLEM 8
Hypokalemia
Liver metastases

## 2017-10-23 NOTE — PROGRESS NOTE ADULT - SUBJECTIVE AND OBJECTIVE BOX
[facility] -- Zac Inman, Jack Hilton, Shamir Tate Savella  Office # [phone]      Follow Up:  abn ekg    Subjective/Observations: Patient seen and examined. Events noted. Resting comfortably in bed. No complaints of chest pain, dyspnea, or palpitations reported.       REVIEW OF SYSTEMS: All other review of systems is negative unless indicated above    PAST MEDICAL & SURGICAL HISTORY:  Liver metastases  Liver cancer  Colon cancer  History of Osteoporosis  Depression  Hypertension  History of Oophorectomy  Anxiety  Hyperlipemia  Ovary removal, prophylactic  History of colon resection      MEDICATIONS  (STANDING):  enoxaparin Injectable 40 milliGRAM(s) SubCutaneous every 24 hours  influenza   Vaccine 0.5 milliLiter(s) IntraMuscular once  mirtazapine 7.5 milliGRAM(s) Oral at bedtime  sertraline 100 milliGRAM(s) Oral daily    MEDICATIONS  (PRN):  ALPRAZolam 0.5 milliGRAM(s) Oral two times a day PRN anxiety  docusate sodium 100 milliGRAM(s) Oral three times a day PRN Constipation  ibuprofen  Tablet 400 milliGRAM(s) Oral every 6 hours PRN Mild to moderate pain  ondansetron   Disintegrating Tablet 4 milliGRAM(s) Oral every 6 hours PRN Nausea and/or Vomiting  oxyCODONE    IR 5 milliGRAM(s) Oral every 6 hours PRN Severe Pain (7 - 10)  polyethylene glycol 3350 17 Gram(s) Oral daily PRN Constipation  senna 2 Tablet(s) Oral at bedtime PRN Constipation      Allergies    penicillin (Other)    Intolerances            Vital Signs Last 24 Hrs  T(C): 36.9 (23 Oct 2017 06:09), Max: 36.9 (23 Oct 2017 06:09)  T(F): 98.5 (23 Oct 2017 06:09), Max: 98.5 (23 Oct 2017 06:09)  HR: 94 (23 Oct 2017 11:00) (94 - 107)  BP: 111/63 (23 Oct 2017 06:09) (111/63 - 119/76)  BP(mean): --  RR: 18 (23 Oct 2017 06:09) (18 - 18)  SpO2: 94% (23 Oct 2017 11:00) (92% - 94%)    I&O's Summary        PHYSICAL EXAM:  TELE: off  Constitutional: NAD, awake and alert, well-developed  HEENT: Moist Mucous Membranes, Anicteric  Pulmonary: Non-labored, breath sounds are clear bilaterally, No wheezing, rales or rhonchi  Cardiovascular: Regular, S1 and S2, No murmurs, rubs, gallops or clicks  Gastrointestinal: Bowel Sounds present, soft, nontender.   Lymph: No peripheral edema. No lymphadenopathy.  Skin: No visible rashes or ulcers.  Psych:  Mood & affect appropriate    LABS: All Labs Reviewed:                        10.8   11.7  )-----------( 281      ( 22 Oct 2017 06:40 )             34.6                         10.9   15.1  )-----------( 332      ( 21 Oct 2017 07:48 )             34.2     23 Oct 2017 07:53    136    |  97     |  8      ----------------------------<  113    3.9     |  31     |  0.29   22 Oct 2017 06:40    137    |  100    |  9      ----------------------------<  105    4.9     |  30     |  0.45   21 Oct 2017 07:48    135    |  99     |  6      ----------------------------<  108    3.1     |  27     |  0.24     Ca    8.1        23 Oct 2017 07:53  Ca    8.2        22 Oct 2017 06:40  Ca    7.8        21 Oct 2017 07:48  Mg     1.9       22 Oct 2017 06:40  Mg     1.7       21 Oct 2017 07:48

## 2017-10-23 NOTE — PROGRESS NOTE ADULT - PROBLEM SELECTOR PROBLEM 1
Sepsis
UTI (urinary tract infection)
UTI (urinary tract infection)
Sepsis

## 2017-10-23 NOTE — PROGRESS NOTE ADULT - PROBLEM SELECTOR PROBLEM 6
Anxiety
R/O CHF (congestive heart failure)
Anxiety

## 2017-10-23 NOTE — PROGRESS NOTE ADULT - PROBLEM SELECTOR PLAN 6
Elevated pro-bnp but improving on repeat BNP.   Patient non currently clinically symptomatic
continue xanax prn  continue zoloft

## 2017-10-23 NOTE — PROGRESS NOTE ADULT - PROBLEM SELECTOR PLAN 2
Resolved. Trial of oral Zofran prior to meal and prn. HIDA scan negative. Most likely due to mets to liver.   Cont regular diet  DC planning

## 2017-10-23 NOTE — PROGRESS NOTE ADULT - PROBLEM SELECTOR PLAN 5
continue xanax prn  continue zoloft
likely reactive to acute infectious process  F/u CBC

## 2017-10-23 NOTE — PROGRESS NOTE ADULT - PROBLEM SELECTOR PLAN 10
Likely  secondary to pain meds. D/W daughter, agreed with Oxycodone 5mg IR Q6 hours for severe pain and Ibuprofen 400mg Q 6 hours for mild to moderate pain. Would avoid Tylenol secondary to hepatotoxicity.  Daughter states that patient was advised by her doctors to not to take tylenol secondary to liver mets.

## 2017-10-23 NOTE — PROGRESS NOTE ADULT - PROVIDER SPECIALTY LIST ADULT
Cardiology
Hospitalist
Infectious Disease
Infectious Disease
Internal Medicine
Surgery
Hospitalist

## 2017-10-23 NOTE — PROGRESS NOTE ADULT - PROBLEM SELECTOR PROBLEM 2
Nausea & vomiting

## 2017-10-25 DIAGNOSIS — R18.8 OTHER ASCITES: ICD-10-CM

## 2017-10-25 DIAGNOSIS — B96.20 UNSPECIFIED ESCHERICHIA COLI [E. COLI] AS THE CAUSE OF DISEASES CLASSIFIED ELSEWHERE: ICD-10-CM

## 2017-10-25 DIAGNOSIS — D47.3 ESSENTIAL (HEMORRHAGIC) THROMBOCYTHEMIA: ICD-10-CM

## 2017-10-25 DIAGNOSIS — C18.9 MALIGNANT NEOPLASM OF COLON, UNSPECIFIED: ICD-10-CM

## 2017-10-25 DIAGNOSIS — A41.9 SEPSIS, UNSPECIFIED ORGANISM: ICD-10-CM

## 2017-10-25 DIAGNOSIS — N39.0 URINARY TRACT INFECTION, SITE NOT SPECIFIED: ICD-10-CM

## 2017-10-25 DIAGNOSIS — K59.00 CONSTIPATION, UNSPECIFIED: ICD-10-CM

## 2017-10-25 DIAGNOSIS — I95.9 HYPOTENSION, UNSPECIFIED: ICD-10-CM

## 2017-10-25 DIAGNOSIS — G89.29 OTHER CHRONIC PAIN: ICD-10-CM

## 2017-10-25 DIAGNOSIS — C78.7 SECONDARY MALIGNANT NEOPLASM OF LIVER AND INTRAHEPATIC BILE DUCT: ICD-10-CM

## 2017-10-25 DIAGNOSIS — C79.89 SECONDARY MALIGNANT NEOPLASM OF OTHER SPECIFIED SITES: ICD-10-CM

## 2017-10-25 DIAGNOSIS — C78.00 SECONDARY MALIGNANT NEOPLASM OF UNSPECIFIED LUNG: ICD-10-CM

## 2017-10-25 DIAGNOSIS — E87.5 HYPERKALEMIA: ICD-10-CM

## 2017-10-25 DIAGNOSIS — R53.83 OTHER FATIGUE: ICD-10-CM

## 2017-10-25 DIAGNOSIS — F41.9 ANXIETY DISORDER, UNSPECIFIED: ICD-10-CM

## 2017-11-07 NOTE — DISCHARGE NOTE ADULT - SECONDARY DIAGNOSIS.
hard copy Metastatic colon cancer in female Prolonged QT interval Chronic midline thoracic back pain Nausea and vomiting, intractability of vomiting not specified, unspecified vomiting type Anxiety and depression

## 2017-11-08 ENCOUNTER — INPATIENT (INPATIENT)
Facility: HOSPITAL | Age: 72
LOS: 2 days | Discharge: HOSPICE | DRG: 180 | End: 2017-11-11
Attending: INTERNAL MEDICINE | Admitting: INTERNAL MEDICINE
Payer: MEDICARE

## 2017-11-08 VITALS
HEIGHT: 66 IN | OXYGEN SATURATION: 99 % | WEIGHT: 139.99 LBS | RESPIRATION RATE: 22 BRPM | HEART RATE: 114 BPM | TEMPERATURE: 98 F | DIASTOLIC BLOOD PRESSURE: 69 MMHG | SYSTOLIC BLOOD PRESSURE: 114 MMHG

## 2017-11-08 DIAGNOSIS — A41.9 SEPSIS, UNSPECIFIED ORGANISM: ICD-10-CM

## 2017-11-08 DIAGNOSIS — F41.9 ANXIETY DISORDER, UNSPECIFIED: ICD-10-CM

## 2017-11-08 DIAGNOSIS — Z90.49 ACQUIRED ABSENCE OF OTHER SPECIFIED PARTS OF DIGESTIVE TRACT: Chronic | ICD-10-CM

## 2017-11-08 DIAGNOSIS — L89.90 PRESSURE ULCER OF UNSPECIFIED SITE, UNSPECIFIED STAGE: ICD-10-CM

## 2017-11-08 DIAGNOSIS — R09.02 HYPOXEMIA: ICD-10-CM

## 2017-11-08 DIAGNOSIS — Z40.02 ENCOUNTER FOR PROPHYLACTIC REMOVAL OF OVARY(S): Chronic | ICD-10-CM

## 2017-11-08 DIAGNOSIS — R62.7 ADULT FAILURE TO THRIVE: ICD-10-CM

## 2017-11-08 DIAGNOSIS — F32.9 MAJOR DEPRESSIVE DISORDER, SINGLE EPISODE, UNSPECIFIED: ICD-10-CM

## 2017-11-08 DIAGNOSIS — Z29.9 ENCOUNTER FOR PROPHYLACTIC MEASURES, UNSPECIFIED: ICD-10-CM

## 2017-11-08 DIAGNOSIS — C80.1 MALIGNANT (PRIMARY) NEOPLASM, UNSPECIFIED: ICD-10-CM

## 2017-11-08 LAB
ALBUMIN SERPL ELPH-MCNC: 1.4 G/DL — LOW (ref 3.3–5)
ALP SERPL-CCNC: 969 U/L — HIGH (ref 40–120)
ALT FLD-CCNC: 42 U/L — SIGNIFICANT CHANGE UP (ref 12–78)
ANION GAP SERPL CALC-SCNC: 11 MMOL/L — SIGNIFICANT CHANGE UP (ref 5–17)
ANISOCYTOSIS BLD QL: SLIGHT — SIGNIFICANT CHANGE UP
AST SERPL-CCNC: 102 U/L — HIGH (ref 15–37)
BILIRUB SERPL-MCNC: 2.4 MG/DL — HIGH (ref 0.2–1.2)
BUN SERPL-MCNC: 14 MG/DL — SIGNIFICANT CHANGE UP (ref 7–23)
CALCIUM SERPL-MCNC: 8.1 MG/DL — LOW (ref 8.5–10.1)
CHLORIDE SERPL-SCNC: 98 MMOL/L — SIGNIFICANT CHANGE UP (ref 96–108)
CK SERPL-CCNC: 326 U/L — HIGH (ref 26–192)
CO2 SERPL-SCNC: 26 MMOL/L — SIGNIFICANT CHANGE UP (ref 22–31)
CREAT SERPL-MCNC: 0.66 MG/DL — SIGNIFICANT CHANGE UP (ref 0.5–1.3)
GLUCOSE SERPL-MCNC: 95 MG/DL — SIGNIFICANT CHANGE UP (ref 70–99)
HCT VFR BLD CALC: 30.7 % — LOW (ref 34.5–45)
HGB BLD-MCNC: 9.3 G/DL — LOW (ref 11.5–15.5)
HYPOCHROMIA BLD QL: SLIGHT — SIGNIFICANT CHANGE UP
INR BLD: 2.21 RATIO — HIGH (ref 0.88–1.16)
LACTATE SERPL-SCNC: 2.4 MMOL/L — HIGH (ref 0.7–2)
LACTATE SERPL-SCNC: 2.7 MMOL/L — HIGH (ref 0.7–2)
LG PLATELETS BLD QL AUTO: SLIGHT — SIGNIFICANT CHANGE UP
LYMPHOCYTES # BLD AUTO: 3 % — LOW (ref 13–44)
MCHC RBC-ENTMCNC: 26.5 PG — LOW (ref 27–34)
MCHC RBC-ENTMCNC: 30.2 GM/DL — LOW (ref 32–36)
MCV RBC AUTO: 87.6 FL — SIGNIFICANT CHANGE UP (ref 80–100)
MICROCYTES BLD QL: SLIGHT — SIGNIFICANT CHANGE UP
MONOCYTES NFR BLD AUTO: 5 % — SIGNIFICANT CHANGE UP (ref 1–9)
NEUTROPHILS NFR BLD AUTO: 91 % — HIGH (ref 43–77)
NEUTS BAND # BLD: 1 % — SIGNIFICANT CHANGE UP (ref 0–8)
NT-PROBNP SERPL-SCNC: 1378 PG/ML — HIGH (ref 0–125)
PLAT MORPH BLD: NORMAL — SIGNIFICANT CHANGE UP
PLATELET # BLD AUTO: 289 K/UL — SIGNIFICANT CHANGE UP (ref 150–400)
POIKILOCYTOSIS BLD QL AUTO: SLIGHT — SIGNIFICANT CHANGE UP
POLYCHROMASIA BLD QL SMEAR: SLIGHT — SIGNIFICANT CHANGE UP
POTASSIUM SERPL-MCNC: 3.6 MMOL/L — SIGNIFICANT CHANGE UP (ref 3.5–5.3)
POTASSIUM SERPL-SCNC: 3.6 MMOL/L — SIGNIFICANT CHANGE UP (ref 3.5–5.3)
PROT SERPL-MCNC: 6.1 G/DL — SIGNIFICANT CHANGE UP (ref 6–8.3)
PROTHROM AB SERPL-ACNC: 24.5 SEC — HIGH (ref 9.8–12.7)
RBC # BLD: 3.51 M/UL — LOW (ref 3.8–5.2)
RBC # FLD: 20.5 % — HIGH (ref 10.3–14.5)
RBC BLD AUTO: ABNORMAL
SODIUM SERPL-SCNC: 135 MMOL/L — SIGNIFICANT CHANGE UP (ref 135–145)
SPHEROCYTES BLD QL SMEAR: SLIGHT — SIGNIFICANT CHANGE UP
TROPONIN I SERPL-MCNC: <.015 NG/ML — SIGNIFICANT CHANGE UP (ref 0.01–0.04)
WBC # BLD: 23.3 K/UL — HIGH (ref 3.8–10.5)
WBC # FLD AUTO: 23.3 K/UL — HIGH (ref 3.8–10.5)

## 2017-11-08 PROCEDURE — 99223 1ST HOSP IP/OBS HIGH 75: CPT

## 2017-11-08 PROCEDURE — 93010 ELECTROCARDIOGRAM REPORT: CPT

## 2017-11-08 PROCEDURE — 71010: CPT | Mod: 26

## 2017-11-08 PROCEDURE — 74177 CT ABD & PELVIS W/CONTRAST: CPT | Mod: 26

## 2017-11-08 PROCEDURE — 99285 EMERGENCY DEPT VISIT HI MDM: CPT

## 2017-11-08 PROCEDURE — 99223 1ST HOSP IP/OBS HIGH 75: CPT | Mod: AI,GC

## 2017-11-08 PROCEDURE — 71275 CT ANGIOGRAPHY CHEST: CPT | Mod: 26

## 2017-11-08 RX ORDER — MORPHINE SULFATE 50 MG/1
4 CAPSULE, EXTENDED RELEASE ORAL ONCE
Qty: 0 | Refills: 0 | Status: DISCONTINUED | OUTPATIENT
Start: 2017-11-08 | End: 2017-11-08

## 2017-11-08 RX ORDER — AZTREONAM 2 G
1000 VIAL (EA) INJECTION ONCE
Qty: 0 | Refills: 0 | Status: COMPLETED | OUTPATIENT
Start: 2017-11-08 | End: 2017-11-08

## 2017-11-08 RX ORDER — VANCOMYCIN HCL 1 G
1000 VIAL (EA) INTRAVENOUS ONCE
Qty: 0 | Refills: 0 | Status: COMPLETED | OUTPATIENT
Start: 2017-11-08 | End: 2017-11-08

## 2017-11-08 RX ORDER — SODIUM CHLORIDE 9 MG/ML
1000 INJECTION INTRAMUSCULAR; INTRAVENOUS; SUBCUTANEOUS ONCE
Qty: 0 | Refills: 0 | Status: COMPLETED | OUTPATIENT
Start: 2017-11-08 | End: 2017-11-08

## 2017-11-08 RX ORDER — SODIUM CHLORIDE 9 MG/ML
1000 INJECTION INTRAMUSCULAR; INTRAVENOUS; SUBCUTANEOUS
Qty: 0 | Refills: 0 | Status: COMPLETED | OUTPATIENT
Start: 2017-11-08 | End: 2017-11-09

## 2017-11-08 RX ORDER — IPRATROPIUM/ALBUTEROL SULFATE 18-103MCG
3 AEROSOL WITH ADAPTER (GRAM) INHALATION ONCE
Qty: 0 | Refills: 0 | Status: COMPLETED | OUTPATIENT
Start: 2017-11-08 | End: 2017-11-08

## 2017-11-08 RX ORDER — ONDANSETRON 8 MG/1
4 TABLET, FILM COATED ORAL EVERY 6 HOURS
Qty: 0 | Refills: 0 | Status: DISCONTINUED | OUTPATIENT
Start: 2017-11-08 | End: 2017-11-10

## 2017-11-08 RX ORDER — VANCOMYCIN HCL 1 G
1000 VIAL (EA) INTRAVENOUS EVERY 12 HOURS
Qty: 0 | Refills: 0 | Status: DISCONTINUED | OUTPATIENT
Start: 2017-11-08 | End: 2017-11-09

## 2017-11-08 RX ORDER — SERTRALINE 25 MG/1
100 TABLET, FILM COATED ORAL DAILY
Qty: 0 | Refills: 0 | Status: DISCONTINUED | OUTPATIENT
Start: 2017-11-08 | End: 2017-11-10

## 2017-11-08 RX ORDER — ALPRAZOLAM 0.25 MG
0.5 TABLET ORAL
Qty: 0 | Refills: 0 | Status: DISCONTINUED | OUTPATIENT
Start: 2017-11-08 | End: 2017-11-10

## 2017-11-08 RX ORDER — ENOXAPARIN SODIUM 100 MG/ML
40 INJECTION SUBCUTANEOUS EVERY 24 HOURS
Qty: 0 | Refills: 0 | Status: DISCONTINUED | OUTPATIENT
Start: 2017-11-08 | End: 2017-11-11

## 2017-11-08 RX ORDER — ONDANSETRON 8 MG/1
4 TABLET, FILM COATED ORAL ONCE
Qty: 0 | Refills: 0 | Status: COMPLETED | OUTPATIENT
Start: 2017-11-08 | End: 2017-11-08

## 2017-11-08 RX ADMIN — MORPHINE SULFATE 4 MILLIGRAM(S): 50 CAPSULE, EXTENDED RELEASE ORAL at 15:48

## 2017-11-08 RX ADMIN — Medication 3 MILLILITER(S): at 15:52

## 2017-11-08 RX ADMIN — MORPHINE SULFATE 4 MILLIGRAM(S): 50 CAPSULE, EXTENDED RELEASE ORAL at 16:20

## 2017-11-08 RX ADMIN — Medication 50 MILLIGRAM(S): at 17:37

## 2017-11-08 RX ADMIN — ONDANSETRON 4 MILLIGRAM(S): 8 TABLET, FILM COATED ORAL at 15:48

## 2017-11-08 RX ADMIN — Medication 250 MILLIGRAM(S): at 19:15

## 2017-11-08 RX ADMIN — SODIUM CHLORIDE 1000 MILLILITER(S): 9 INJECTION INTRAMUSCULAR; INTRAVENOUS; SUBCUTANEOUS at 15:52

## 2017-11-08 NOTE — H&P ADULT - PROBLEM SELECTOR PLAN 3
- colon cancer with metastasis to liver and lungs  - soft diet   - speech and swallow eval  - dietician eval - colon cancer with metastasis to liver and lungs  - soft diet   - speech and swallow eval  - consult dietary - colon cancer with metastasis to liver and lungs  - speech and swallow eval  - consult dietary

## 2017-11-08 NOTE — H&P ADULT - PROBLEM SELECTOR PLAN 1
- empiric coverage with vancomycin   - f/u blood culture   - f/u urine culture  - IVF NS 75/hr  - ID consult Dr. Caruso

## 2017-11-08 NOTE — H&P ADULT - NSHPREVIEWOFSYSTEMS_GEN_ALL_CORE
Constitutional: denies fever, chills, diaphoresis   HEENT: denies blurry vision, difficulty hearing  Respiratory: +SOB, +BOWEN, denies cough, sputum production, wheezing, hemoptysis  Cardiovascular: denies CP, palpitations, edema  Gastrointestinal: +constipation, +abdominal pain, denies nausea, vomiting, melena, hematochezia   Genitourinary: + dark malodorous urine, denies dysuria, frequency, urgency  Skin/Breast: +red midline lower back sore   Musculoskeletal: denies myalgias, joint swelling, +muscle weakness  Neurologic: +weakness, denies headache, dizziness, paresthesias, numbness/tingling  Hematology/Oncology: hx of colon cancer with liver and lung metastasis  ROS negative except as noted above

## 2017-11-08 NOTE — ED PROVIDER NOTE - MEDICAL DECISION MAKING DETAILS
72 female with hypoxia, tachycardia, f/u labs, chest xray, ct angio chest, o2, iv fluids, antiemetics, pain control

## 2017-11-08 NOTE — ED ADULT TRIAGE NOTE - CHIEF COMPLAINT QUOTE
pt from home, c/o diff breathing, o2 sat on ems arrival 77%, pt placed on NRB oxygen sat increased to 99%

## 2017-11-08 NOTE — H&P ADULT - NSHPPHYSICALEXAM_GEN_ALL_CORE
Physical Exam:  General: thin, ill appearing   HEENT: NCAT, PERRLA, EOMI bl, dried mucous membrane, dark colored tongue  Neck: Supple, nontender  Neurology: A&Ox3, nonfocal  Respiratory: +decreased, coarse breathsounds b/l   CV: RRR, +S1/S2, no murmurs, rubs or gallops  Abdominal: Soft, NT, ND +BSx4  Extremities: No C/C/E, + peripheral pulses  Skin: warm, dry

## 2017-11-08 NOTE — H&P ADULT - PROBLEM SELECTOR PLAN 2
- currently stable on supplemental O2  - Cardio eval appreciated  - CTA showed lung metastasis   - consider palliative consult

## 2017-11-08 NOTE — H&P ADULT - NSHPSOCIALHISTORY_GEN_ALL_CORE
Patient lives at home with daughter and   baseline able to sit and watch TV  need assistant to ambulate to bathroom at home   Seen by home care RN 2x/week at home  PT eval at home  social work service at home   denies tobacco, alcohol use  +uses medicinal marijuana

## 2017-11-08 NOTE — ED PROVIDER NOTE - OBJECTIVE STATEMENT
72 female h/o colon cancer with colon resection, liver cancer with mets, last chemo 10/15/17, lives at home with daughter, presents to ER by ambulance with report of low o2 sat for the last 24 hours 74% with shortness of breath, decreased appetite, chronic abdominal pain, constipation, increased weakness, nausea, dry heaving. 72 female h/o colon cancer with colon resection, mets to the liver and lungs, last chemo 10/15/17, lives at home with daughter, presents to ER by ambulance with report of low o2 sat for the last 24 hours 74% with shortness of breath, decreased appetite, chronic abdominal pain, constipation, increased weakness, nausea, dry heaving.

## 2017-11-08 NOTE — H&P ADULT - ASSESSMENT
Patient is a 71 yo female with pmhx of colon cancer s/p hemicolectomy, liver metastasis, HLD, HTN, anxiety, depression, osteoporosis, oophorectomy who presented with hypoxia, tachycardia, and failure to thrive, admit for progressive metastasis to lungs, sepsis.

## 2017-11-08 NOTE — H&P ADULT - PROBLEM SELECTOR PLAN 4
- colon cancer with metastasis to liver and lungs  - CTA showed nodules along peritoneal thickening in Right Pelvis, carcinomatosis  - consider palliative consult, patient's Karnofsky score 20-30 with known metastatic disease

## 2017-11-09 LAB
ALBUMIN SERPL ELPH-MCNC: 1.2 G/DL — LOW (ref 3.3–5)
ALP SERPL-CCNC: 824 U/L — HIGH (ref 40–120)
ALT FLD-CCNC: 35 U/L — SIGNIFICANT CHANGE UP (ref 12–78)
ANION GAP SERPL CALC-SCNC: 9 MMOL/L — SIGNIFICANT CHANGE UP (ref 5–17)
APPEARANCE UR: ABNORMAL
AST SERPL-CCNC: 77 U/L — HIGH (ref 15–37)
BACTERIA # UR AUTO: ABNORMAL
BASOPHILS # BLD AUTO: 0.1 K/UL — SIGNIFICANT CHANGE UP (ref 0–0.2)
BASOPHILS NFR BLD AUTO: 0.4 % — SIGNIFICANT CHANGE UP (ref 0–2)
BILIRUB SERPL-MCNC: 2.1 MG/DL — HIGH (ref 0.2–1.2)
BILIRUB UR-MCNC: ABNORMAL
BUN SERPL-MCNC: 11 MG/DL — SIGNIFICANT CHANGE UP (ref 7–23)
CALCIUM SERPL-MCNC: 7.6 MG/DL — LOW (ref 8.5–10.1)
CHLORIDE SERPL-SCNC: 100 MMOL/L — SIGNIFICANT CHANGE UP (ref 96–108)
CO2 SERPL-SCNC: 28 MMOL/L — SIGNIFICANT CHANGE UP (ref 22–31)
COLOR SPEC: YELLOW — SIGNIFICANT CHANGE UP
CREAT SERPL-MCNC: 0.52 MG/DL — SIGNIFICANT CHANGE UP (ref 0.5–1.3)
DIFF PNL FLD: ABNORMAL
EOSINOPHIL # BLD AUTO: 0.1 K/UL — SIGNIFICANT CHANGE UP (ref 0–0.5)
EOSINOPHIL NFR BLD AUTO: 0.3 % — SIGNIFICANT CHANGE UP (ref 0–6)
EPI CELLS # UR: ABNORMAL
GLUCOSE SERPL-MCNC: 79 MG/DL — SIGNIFICANT CHANGE UP (ref 70–99)
GLUCOSE UR QL: NEGATIVE — SIGNIFICANT CHANGE UP
HCT VFR BLD CALC: 26.8 % — LOW (ref 34.5–45)
HGB BLD-MCNC: 8 G/DL — LOW (ref 11.5–15.5)
KETONES UR-MCNC: ABNORMAL
LEUKOCYTE ESTERASE UR-ACNC: ABNORMAL
LYMPHOCYTES # BLD AUTO: 0.7 K/UL — LOW (ref 1–3.3)
LYMPHOCYTES # BLD AUTO: 3.2 % — LOW (ref 13–44)
MCHC RBC-ENTMCNC: 26.4 PG — LOW (ref 27–34)
MCHC RBC-ENTMCNC: 29.7 GM/DL — LOW (ref 32–36)
MCV RBC AUTO: 89 FL — SIGNIFICANT CHANGE UP (ref 80–100)
MONOCYTES # BLD AUTO: 1.5 K/UL — HIGH (ref 0–0.9)
MONOCYTES NFR BLD AUTO: 7.2 % — SIGNIFICANT CHANGE UP (ref 1–9)
NEUTROPHILS # BLD AUTO: 18.8 K/UL — HIGH (ref 1.8–7.4)
NEUTROPHILS NFR BLD AUTO: 89 % — HIGH (ref 43–77)
NITRITE UR-MCNC: NEGATIVE — SIGNIFICANT CHANGE UP
PH UR: 6.5 — SIGNIFICANT CHANGE UP (ref 5–8)
PLATELET # BLD AUTO: 278 K/UL — SIGNIFICANT CHANGE UP (ref 150–400)
POTASSIUM SERPL-MCNC: 3.3 MMOL/L — LOW (ref 3.5–5.3)
POTASSIUM SERPL-SCNC: 3.3 MMOL/L — LOW (ref 3.5–5.3)
PROT SERPL-MCNC: 5.4 G/DL — LOW (ref 6–8.3)
PROT UR-MCNC: 75 MG/DL
RBC # BLD: 3.01 M/UL — LOW (ref 3.8–5.2)
RBC # FLD: 20.7 % — HIGH (ref 10.3–14.5)
RBC CASTS # UR COMP ASSIST: SIGNIFICANT CHANGE UP /HPF (ref 0–4)
SODIUM SERPL-SCNC: 137 MMOL/L — SIGNIFICANT CHANGE UP (ref 135–145)
SP GR SPEC: 1 — LOW (ref 1.01–1.02)
UROBILINOGEN FLD QL: 12
WBC # BLD: 21.1 K/UL — HIGH (ref 3.8–10.5)
WBC # FLD AUTO: 21.1 K/UL — HIGH (ref 3.8–10.5)
WBC UR QL: ABNORMAL

## 2017-11-09 PROCEDURE — 99233 SBSQ HOSP IP/OBS HIGH 50: CPT

## 2017-11-09 RX ORDER — MULTIVIT-MIN/FERROUS GLUCONATE 9 MG/15 ML
1 LIQUID (ML) ORAL DAILY
Qty: 0 | Refills: 0 | Status: CANCELLED | OUTPATIENT
Start: 2017-11-09 | End: 2017-11-11

## 2017-11-09 RX ORDER — POTASSIUM CHLORIDE 20 MEQ
40 PACKET (EA) ORAL ONCE
Qty: 0 | Refills: 0 | Status: COMPLETED | OUTPATIENT
Start: 2017-11-09 | End: 2017-11-09

## 2017-11-09 RX ORDER — MORPHINE SULFATE 50 MG/1
2 CAPSULE, EXTENDED RELEASE ORAL EVERY 4 HOURS
Qty: 0 | Refills: 0 | Status: DISCONTINUED | OUTPATIENT
Start: 2017-11-09 | End: 2017-11-10

## 2017-11-09 RX ORDER — ASCORBIC ACID 60 MG
500 TABLET,CHEWABLE ORAL
Qty: 0 | Refills: 0 | Status: CANCELLED | OUTPATIENT
Start: 2017-11-09 | End: 2017-11-11

## 2017-11-09 RX ORDER — MORPHINE SULFATE 50 MG/1
1 CAPSULE, EXTENDED RELEASE ORAL EVERY 4 HOURS
Qty: 0 | Refills: 0 | Status: DISCONTINUED | OUTPATIENT
Start: 2017-11-09 | End: 2017-11-10

## 2017-11-09 RX ORDER — MORPHINE SULFATE 50 MG/1
4 CAPSULE, EXTENDED RELEASE ORAL EVERY 4 HOURS
Qty: 0 | Refills: 0 | Status: DISCONTINUED | OUTPATIENT
Start: 2017-11-09 | End: 2017-11-10

## 2017-11-09 RX ADMIN — SERTRALINE 100 MILLIGRAM(S): 25 TABLET, FILM COATED ORAL at 12:19

## 2017-11-09 RX ADMIN — MORPHINE SULFATE 2 MILLIGRAM(S): 50 CAPSULE, EXTENDED RELEASE ORAL at 23:52

## 2017-11-09 RX ADMIN — SODIUM CHLORIDE 75 MILLILITER(S): 9 INJECTION INTRAMUSCULAR; INTRAVENOUS; SUBCUTANEOUS at 00:31

## 2017-11-09 RX ADMIN — SODIUM CHLORIDE 75 MILLILITER(S): 9 INJECTION INTRAMUSCULAR; INTRAVENOUS; SUBCUTANEOUS at 12:20

## 2017-11-09 RX ADMIN — MORPHINE SULFATE 4 MILLIGRAM(S): 50 CAPSULE, EXTENDED RELEASE ORAL at 12:18

## 2017-11-09 RX ADMIN — Medication 0.5 MILLIGRAM(S): at 23:30

## 2017-11-09 RX ADMIN — ONDANSETRON 4 MILLIGRAM(S): 8 TABLET, FILM COATED ORAL at 06:53

## 2017-11-09 RX ADMIN — MORPHINE SULFATE 2 MILLIGRAM(S): 50 CAPSULE, EXTENDED RELEASE ORAL at 08:05

## 2017-11-09 RX ADMIN — MORPHINE SULFATE 4 MILLIGRAM(S): 50 CAPSULE, EXTENDED RELEASE ORAL at 12:35

## 2017-11-09 RX ADMIN — ENOXAPARIN SODIUM 40 MILLIGRAM(S): 100 INJECTION SUBCUTANEOUS at 06:52

## 2017-11-09 RX ADMIN — MORPHINE SULFATE 2 MILLIGRAM(S): 50 CAPSULE, EXTENDED RELEASE ORAL at 07:51

## 2017-11-09 RX ADMIN — Medication 250 MILLIGRAM(S): at 06:51

## 2017-11-09 RX ADMIN — ONDANSETRON 4 MILLIGRAM(S): 8 TABLET, FILM COATED ORAL at 22:22

## 2017-11-09 RX ADMIN — Medication 40 MILLIEQUIVALENT(S): at 21:51

## 2017-11-09 NOTE — CONSULT NOTE ADULT - ASSESSMENT
72 f colon cancer s/p resection, liver mets, anxiety, benign ovarian tumor, and HLD, last seen here several weeks ago for nausea and vomiting.   EKG with nonspecific abnormalities at that time.  We followed her though that hospitalization.  Echo was performed revealed no significant abnormalities on a limited study.  She was eventually discharged to home.    She now presents having been experiencing dyspnea, hypoxia and hypotension with tachycardia.      -there is no evidence of acute ischemia.  -there is no evidence of significant arrhythmia.  -there is no evidence for meaningful  volume overload.  -hypoxia and tachycardia in the setting of advanced malignancy suggests high risk for PE  -CTA chest is suggested  -hr likely reactive to acute pulmonary process  -hypotension remains of uncertain etiology, consider a consequence of pe, infection, etc  -normal lvef as of last month on echo, can be fairly liberal with fluids, at least for now  -follow lytes and replete prn  -dvt ppx  -will follow    The patient is at risk of abrupt decompensation.  35 minutes of critical care time was spent with this patient.
Metastatic colon cancer with continued progressive disease, clinical deteriation and worsening performance status. do not feel patient has adequate performance status for additional treatment and agree with continued palliative care    Had discussion with  and daughter in regard to diagnosis and overall poor prognosis. she has desire to care for her at home and hope her performance status improves so she can continue treatment. she wants O2 at home and continued care with home care through [facility]. she is willing to talk to hospice but not necessarily willing to put her on.   will continue current palliative care and pain management.
While patient is clearly at risk for infection, tachycardia, tachypnea, elevated lactate, and leukocytosis I suspect here are on a noninfectious basis.  Leukemoid reaction suspect related to her hepatic metastases  Hypoxemia from pulmonary mets -> BOWEN with minimal exertion -> tachycardia  Lactate can be from her tumor as well.  Unfortunately, even if she does have a superimposed infection, her rapidly progressive malignancy is the driving this process, and all decisions regarding her care should be viewed through this lens.  No clear localizing findings on exam  CT C/W mets, not pneumonia  Abdominal tenderness likely from her cancer, not an infection  U/A contaminated specimen  No evidence of skin/soft tissue infection  Port not infected appearing, would have to wait for cultures, and would not be a candidate to have port removed in any event  Role of vancomycin unclear    Suggestions  Stop antibiotics  Observe  Hospice appropriate here    Rashid Caruso MD  [phone]

## 2017-11-09 NOTE — PROGRESS NOTE ADULT - SUBJECTIVE AND OBJECTIVE BOX
[facility]    Zac Inman, Yobani, Jack, Lea, Shamir, Alejandra      [phone]    CHIEF COMPLAINT: Patient is a 72y old  Female who presents with a chief complaint of Decrease PO intake, hypoxia (08 Nov 2017 23:55)    HPI: 72 f colon cancer s/p resection, liver mets, anxiety, benign ovarian tumor, and HLD, last seen here several weeks ago for nausea and vomiting.   EKG with nonspecific abnormalities at that time.  We followed her though that hospitalization.  Echo was performed revealed no significant abnormalities on a limited study.  She was eventually discharged to home.    She now presents having been experiencing dyspnea, hypoxia and hypotension with tachycardia.  There was discussion about getting outpt oxygen and it was felt best that this be handled through the ed.  She is also quite weak.  Denies fever, cp.  Has been constipated and having abdominal pain.  No edema.    Follow up: Patient seen and examined at bedside. Lethargic, most questions answered by son. Denies chest pain, palpitations, SOB. Reports not feeling hungry.     Telemetry: NS 95, pulse ox 89-90%.    MEDICATIONS  (STANDING):  enoxaparin Injectable 40 milliGRAM(s) SubCutaneous every 24 hours  sertraline 100 milliGRAM(s) Oral daily  sodium chloride 0.9%. 1000 milliLiter(s) (75 mL/Hr) IV Continuous <Continuous>  vancomycin  IVPB 1000 milliGRAM(s) IV Intermittent every 12 hours    MEDICATIONS  (PRN):  ALPRAZolam 0.5 milliGRAM(s) Oral two times a day PRN anxiety  morphine  - Injectable 1 milliGRAM(s) IV Push every 4 hours PRN Mild Pain (1 - 3)  morphine  - Injectable 2 milliGRAM(s) IV Push every 4 hours PRN Moderate Pain (4 - 6)  morphine  - Injectable 4 milliGRAM(s) IV Push every 4 hours PRN Severe Pain (7 - 10)  ondansetron   Disintegrating Tablet 4 milliGRAM(s) Oral every 6 hours PRN Nausea and/or Vomiting      REVIEW OF SYSTEMS:  eye, ent, GI, , allergic, dermatologic, musculoskeletal and neurologic are negative except as described above    Vital Signs Last 24 Hrs  T(C): 36.7 (09 Nov 2017 05:37), Max: 36.8 (08 Nov 2017 15:25)  T(F): 98 (09 Nov 2017 05:37), Max: 98.2 (08 Nov 2017 15:25)  HR: 100 (09 Nov 2017 05:37) (90 - 114)  BP: 109/70 (09 Nov 2017 05:37) (109/70 - 124/58)  BP(mean): --  RR: 17 (09 Nov 2017 05:37) (16 - 22)  SpO2: 90% (09 Nov 2017 05:37) (90% - 100%)    I&O's Summary    PHYSICAL EXAM:  Constitutional: elderly lethargic female, on 5L NC  HEENT:  MMM, sclerae anicteric, conjunctivae clear, no oral cyanosis.  Pulmonary: decreased breath sounds  Cardiovascular: Regular, S1 and S2.  No murmur.  No rubs, gallops or clicks  Gastrointestinal: Bowel Sounds present, soft, nontender.   Lymph: No peripheral edema.   Neurological: Alert, no focal deficits  Skin: No rashes.  Psych:  Mood & affect appropriate    LABS: All Labs Reviewed:                        9.3    23.3  )-----------( 289      ( 08 Nov 2017 15:59 )             30.7     08 Nov 2017 15:59    135    |  98     |  14     ----------------------------<  95     3.6     |  26     |  0.66     Ca    8.1        08 Nov 2017 15:59    TPro  6.1    /  Alb  1.4    /  TBili  2.4    /  DBili  x      /  AST  102    /  ALT  42     /  AlkPhos  969    08 Nov 2017 15:59    PT/INR - ( 08 Nov 2017 15:59 )   PT: 24.5 sec;   INR: 2.21 ratio           CARDIAC MARKERS ( 08 Nov 2017 15:59 )  <.015 ng/mL / x     / 326 U/L / x     / x          Blood Culture:   11-08 @ 15:59  Pro Bnp 1378    < from: TTE Echo Doppler w/o Cont (10.22.17 @ 13:22) >     EXAM:  ECHO TTE W/O CON COMP W/DOPPLR         PROCEDURE DATE:  10/22/2017        INTERPRETATION:  INDICATION: Abnormal EKG  Referring M.DSis:Lea  Blood Pressure 110/71        Weight (kg) :61     Height (cm):167       BSA (sq m): 1.7  Technician: RM    Dimensions:    LA 3.1       Normal Values: 2.0 - 4.0 cm    Ao 2.8        Normal Values: 2.0 - 3.8 cm  SEPTUM 1.2       Normal Values: 0.6 - 1.2 cm  PWT 1.1       Normal Values: 0.6 - 1.1 cm  LVIDd 3.3         Normal Values: 3.0 - 5.6 cm  LVIDs2.1         Normal Values: 1.8 - 4.0 cm      OBSERVATIONS:  Technically difficult study  Mitral Valve: Normal mitral annulus and valve. Trace mitral regurgitation.  Aortic Valve/Aorta: Normal trileaflet aortic valve. Mild AI. Normal   aortic root  Tricuspid Valve: Normal tricuspid valve. Mild tricuspid regurgitation.  Pulmonic Valve: The pulmonic valve is not well visualized. Probably   normal.  Left Atrium: Normal  Right Atrium: Normal  Left Ventricle: Endocardium is not well-visualized. There is a mild   concentric left ventricular hypertrophy. There appears to be normal left   ventricular systolic function. The EF is approximately 65-70%.  Right Ventricle: Normal right ventricular size and function.  Pericardium/Pleura: Trace pericardial effusion noted.  Pulmonary/RV Pressure: The right ventricular systolic pressure is   estimated to be 40mmHg, assuming that the right atrial pressure is   estimated to be 8 mmHg. This is consistent with mild pulmonary   hypertension.  LV Diastolic Function: Mild diastolic dysfunction.     Conclusion: Technically difficult study. Mild LVH. Normal systolic left   ventricular function. Mild pulmonary hypertension. Mild diastolic   dysfunction.                  LIZ FERRARA M.D., ATTENDING CARDIOLOGIST  This document has been electronically signed. Oct 23 2017  3:30PM                < end of copied text >

## 2017-11-09 NOTE — DIETITIAN INITIAL EVALUATION ADULT. - OTHER INFO
Pt awake/fatigued at time of visit;  and daughter at bedside. Hx metastatic colon ca. Last chemo tx 10/15/17. Per family pt with poor appetite/po intake for past month with signiciant decrease in last week; consuming mainly liquids & ensure pta. Seen by SLP (11/9) puree diet w/ thin liquids recommended. Food prefs/meal alternatives obtained through family. Per daughter pt was on remeron in past however had no side effect of increasing appetite. Recently started on medical marijuina which daughter believes has increased pts fluid consumption. Occasional nausea. Loose BM 11/8.

## 2017-11-09 NOTE — SWALLOW BEDSIDE ASSESSMENT ADULT - SWALLOW EVAL: RECOMMENDED FEEDING/EATING TECHNIQUES
alternate food with liquid/crush medication (when feasible)/hard swallow w/ each bite or sip/small sips/bites/maintain upright posture during/after eating for 30 mins

## 2017-11-09 NOTE — PROGRESS NOTE ADULT - SUBJECTIVE AND OBJECTIVE BOX
Patient is a 72y old  Female who presents with a chief complaint of Decrease PO intake, hypoxia (08 Nov 2017 23:55)    HPI:  Patient is a 71 yo female with pmhx of colon cancer s/p hemicolectomy, liver metastasis, HLD, HTN, anxiety, depression, osteoporosis, oophorectomy who presented with hypoxia, tachycardia, and failure to thrive. Patient presented with daughter at bedside. Per daughter, patient found to be hypoxic at home with O2 saturation in the 70s, and tachycardia HR in the 110s at home for the past day. Was told by oncologist to bring patient to the ER for evaluation. Patient also complains of vague RLQ intermittent abdominal pain for past few weeks. Per daughter, patient's last chemo treatment was 10/15/17. Patient has poor appetite for the past week, recently started on medicinal marijuana. At baseline at home, patient able to sit in chair and watch TV, and ambulate to bathroom with assistance from daughter or . Patient also complain of lower back pain associated with bedsore. Daughter brought patient to urgent care yesterday and xray found lung metastasis. Daughter stated that patient has dark, malodorous urine at home, but UA result at her oncologist yesterday was negative. Currently, patient able to breathe better on NC, and is resting in bed. Denies fever, chills, chest pain, palpitations, cough, nausea, vomiting, diarrhea, constipation, urinary frequency, urgency, or dysuria, headaches, changes in vision, dizziness, numbness, or tingling.    In the ER, patient's is afebrile, 99bpm, 124/58, and 100% O2 on NC. Patient found to have elevated WBC 23.3, low H/H 9.3/30.7. Elevated Alk phos 969, and elevated lactate 2.7. Patient also has elevated , ProBNP 1378. CTA showed no pulmonary embolism, worsening of mediastinal/hilar lymphadenopathy and diffuse pulmonary metastatic disease, worsening of liver metastases, small amount of ascites, small nodule along the peritoneal thickening in the right pelvis may represent carcinomatosis. EKG showed normal SR, prolonged QT. (08 Nov 2017 20:17)    INTERVAL HPI/OVERNIGHT EVENTS: Pt admitted overnight. Seen and examined at bedside in the AM resting comfortably with  at bedside. Pt and  state that she has had some pain overnight, worse in the RLQ, and morphine was appropriately prescribed. Pt complains of lethargy. No other complaints at this time. Denies SOB, chest pains, difficulty breathing. Denies feeling febrile or having shakes/chills/sweats.     MEDICATIONS  (STANDING):  enoxaparin Injectable 40 milliGRAM(s) SubCutaneous every 24 hours  sertraline 100 milliGRAM(s) Oral daily    MEDICATIONS  (PRN):  ALPRAZolam 0.5 milliGRAM(s) Oral two times a day PRN anxiety  morphine  - Injectable 1 milliGRAM(s) IV Push every 4 hours PRN Mild Pain (1 - 3)  morphine  - Injectable 2 milliGRAM(s) IV Push every 4 hours PRN Moderate Pain (4 - 6)  morphine  - Injectable 4 milliGRAM(s) IV Push every 4 hours PRN Severe Pain (7 - 10)  ondansetron   Disintegrating Tablet 4 milliGRAM(s) Oral every 6 hours PRN Nausea and/or Vomiting    Allergies  penicillin (Other)    REVIEW OF SYSTEMS:  CONSTITUTIONAL: No fever, endorses fatigue  EYES: No eye pain, visual disturbances, or discharge  ENMT:  No difficulty hearing, tinnitus, vertigo; No sinus or throat pain  NECK: No pain or stiffness  RESPIRATORY: No cough, wheezing, chills or hemoptysis; No shortness of breath  CARDIOVASCULAR: No chest pain, palpitations, dizziness, or leg swelling  GASTROINTESTINAL: RLQ abdominal pain No nausea, vomiting, or hematemesis; No diarrhea or constipation. No melena or hematochezia.  GENITOURINARY: No dysuria, frequency, hematuria, or incontinence  NEUROLOGICAL: No headaches, memory loss, loss of strength, numbness, or tremors  SKIN: No itching, burning, rashes, or lesions   LYMPH NODES: No enlarged glands  ENDOCRINE: No heat or cold intolerance; No hair loss; No polydipsia or polyuria  MUSCULOSKELETAL: No joint pain or swelling; No muscle, back, or extremity pain  PSYCHIATRIC: No depression, anxiety, mood swings, or difficulty sleeping  HEME/LYMPH: No easy bruising, or bleeding gums  ALLERGY AND IMMUNOLOGIC: No hives or eczema    Vital Signs Last 24 Hrs  T(C): 36.7 (09 Nov 2017 05:37), Max: 36.8 (08 Nov 2017 15:25)  T(F): 98 (09 Nov 2017 05:37), Max: 98.2 (08 Nov 2017 15:25)  HR: 100 (09 Nov 2017 05:37) (90 - 114)  BP: 109/70 (09 Nov 2017 05:37) (109/70 - 124/58)  RR: 17 (09 Nov 2017 05:37) (16 - 22)  SpO2: 90% (09 Nov 2017 05:37) (90% - 100%)    PHYSICAL EXAM:  GENERAL: No acute distress, frail, undernourished  HEAD:  Atraumatic, Normocephalic  EYES: EOMI, PERRL, conjunctiva and sclera clear  ENMT: No tonsillar erythema, exudates, or enlargement; Moist mucous membranes  NECK: Supple, No Jugular Venous Distension, Normal thyroid  NERVOUS SYSTEM:  Alert & Oriented X3, Good concentration  CHEST/LUNG: decreased breath sounds lower lobes bilaterally; No rales, rhonchi, wheezing, or rubs  HEART: Regular rate and rhythm; No murmurs, rubs, or gallops  ABDOMEN: Soft, Nontender, Nondistended; Bowel sounds present  EXTREMITIES:  2+ Peripheral Pulses, No clubbing, cyanosis, or edema; port noted   LYMPH: No lymphadenopathy noted  SKIN: No rashes or lesions    LABS:                        8.0    21.1  )-----------( 278      ( 09 Nov 2017 10:15 )             26.8     09 Nov 2017 10:15    137    |  100    |  11     ----------------------------<  79     3.3     |  28     |  0.52     Ca    7.6        09 Nov 2017 10:15    TPro  5.4    /  Alb  1.2    /  TBili  2.1    /  DBili  x      /  AST  77     /  ALT  35     /  AlkPhos  824    09 Nov 2017 10:15    PT/INR - ( 08 Nov 2017 15:59 )   PT: 24.5 sec;   INR: 2.21 ratio        RADIOLOGY & ADDITIONAL TESTS:    Imaging Personally Reviewed:  [x ] YES     Consultant(s) Notes Reviewed:      Care Discussed with Consultants/Other Providers:

## 2017-11-09 NOTE — PROGRESS NOTE ADULT - SUBJECTIVE AND OBJECTIVE BOX
Patient is a 72y old  Female who presents with a chief complaint of Decrease PO intake, hypoxia (2017 23:55)        HPI:  Patient is a 71 yo female with pmhx of colon cancer s/p hemicolectomy, liver metastasis, HLD, HTN, anxiety, depression, osteoporosis, oophorectomy who presented with hypoxia, tachycardia, and failure to thrive. Patient presented with daughter at bedside. Per daughter, patient found to be hypoxic at home with O2 saturation in the 70s, and tachycardia HR in the 110s at home for the past day. Was told by oncologist to bring patient to the ER for evaluation. Patient also complains of vague RLQ intermittent abdominal pain for past few weeks. Per daughter, patient's last chemo treatment was 10/15/17. Patient has poor appetite for the past week, recently started on medicinal marijuana. At baseline at home, patient able to sit in chair and watch TV, and ambulate to bathroom with assistance from daughter or . Patient also complain of lower back pain associated with bedsore. Daughter brought patient to urgent care yesterday and xray found lung metastasis. Daughter stated that patient has dark, malodorous urine at home, but UA result at her oncologist yesterday was negative. Currently, patient able to breathe better on NC, and is resting in bed. Denies fever, chills, chest pain, palpitations, cough, nausea, vomiting, diarrhea, constipation, urinary frequency, urgency, or dysuria, headaches, changes in vision, dizziness, numbness, or tingling.    In the ER, patient's is afebrile, 99bpm, 124/58, and 100% O2 on NC. Patient found to have elevated WBC 23.3, low H/H 9.3/30.7. Elevated Alk phos 969, and elevated lactate 2.7. Patient also has elevated , ProBNP 1378. CTA showed no pulmonary embolism, worsening of mediastinal/hilar lymphadenopathy and diffuse pulmonary metastatic disease, worsening of liver metastases, small amount of ascites, small nodule along the peritoneal thickening in the right pelvis may represent carcinomatosis. EKG showed normal SR, prolonged QT. (2017 20:17)      SUBJECTIVE & OBJECTIVE: Pt seen and examined at bedside.     PHYSICAL EXAM:  T(C): 36.7 (17 @ 05:37), Max: 36.8 (17 @ 15:25)  HR: 100 (17 @ 05:37) (90 - 114)  BP: 109/70 (17 @ 05:37) (109/70 - 124/58)  RR: 17 (17 @ 05:37) (16 - 22)  SpO2: 90% (17 @ 05:37) (90% - 100%)  Wt(kg): -- Height (cm): 167.64 ( @ 14:43)  Weight (kg): 63.5 ( @ :43)  BMI (kg/m2): 22.6 ( @ :43)  BSA (m2): 1.72 ( @ :)  GENERAL: NAD, well-groomed, well-developed  HEAD:  Atraumatic, Normocephalic  EYES: EOMI, PERRLA, conjunctiva and sclera clear  ENMT: Moist mucous membranes  NECK: Supple, No JVD  NERVOUS SYSTEM:  Alert & Oriented X3,   CHEST/LUNG:decrease air entry b/l  HEART: Regular rate and rhythm; No murmurs, rubs, or gallops  ABDOMEN: Soft, Nontender, Nondistended; Bowel sounds present  EXTREMITIES:  2+ Peripheral Pulses, No clubbing, cyanosis, or edema        MEDICATIONS  (STANDING):  enoxaparin Injectable 40 milliGRAM(s) SubCutaneous every 24 hours  sertraline 100 milliGRAM(s) Oral daily    MEDICATIONS  (PRN):  ALPRAZolam 0.5 milliGRAM(s) Oral two times a day PRN anxiety  morphine  - Injectable 1 milliGRAM(s) IV Push every 4 hours PRN Mild Pain (1 - 3)  morphine  - Injectable 2 milliGRAM(s) IV Push every 4 hours PRN Moderate Pain (4 - 6)  morphine  - Injectable 4 milliGRAM(s) IV Push every 4 hours PRN Severe Pain (7 - 10)  ondansetron   Disintegrating Tablet 4 milliGRAM(s) Oral every 6 hours PRN Nausea and/or Vomiting      LABS:                        8.0    21.1  )-----------( 278      ( 2017 10:15 )             26.8         137  |  100  |  11  ----------------------------<  79  3.3<L>   |  28  |  0.52    Ca    7.6<L>      2017 10:15    TPro  5.4<L>  /  Alb  1.2<L>  /  TBili  2.1<H>  /  DBili  x   /  AST  77<H>  /  ALT  35  /  AlkPhos  824<H>  11-09    PT/INR - ( 2017 15:59 )   PT: 24.5 sec;   INR: 2.21 ratio           Urinalysis Basic - ( 2017 07:40 )    Color: Yellow / Appearance: Slightly Turbid / S.005 / pH: x  Gluc: x / Ketone: Trace  / Bili: Moderate / Urobili: 12   Blood: x / Protein: 75 mg/dL / Nitrite: Negative   Leuk Esterase: Moderate / RBC: 0-2 /HPF / WBC 26-50   Sq Epi: x / Non Sq Epi: Moderate / Bacteria: Moderate        CAPILLARY BLOOD GLUCOSE          CAPILLARY BLOOD GLUCOSE        CAPILLARY BLOOD GLUCOSE          CARDIAC MARKERS ( 2017 15:59 )  <.015 ng/mL / x     / 326 U/L / x     / x            RECENT CULTURES:      RADIOLOGY & ADDITIONAL TESTS:                        DVT/GI ppx  Discussed with pt @ bedside

## 2017-11-09 NOTE — DIETITIAN INITIAL EVALUATION ADULT. - FACTORS AFF FOOD INTAKE
difficulty chewing/difficulty feeding self/difficulty swallowing/persistent lack of appetite/persistent nausea/change in mental status/change in sense of smell or taste

## 2017-11-09 NOTE — ED ADULT NURSE REASSESSMENT NOTE - NS ED NURSE REASSESS COMMENT FT1
Pt admitted to medicine with RT- pt was transported via stretcher in stable condition - daughter with patient -

## 2017-11-09 NOTE — CONSULT NOTE ADULT - SUBJECTIVE AND OBJECTIVE BOX
[facility], Division of Infectious Diseases  REBEKA Gregory A. Lee    ANDRE, STEPHY  72y, Female  [id number]    HPI--  72F known to me from recent [facility] admission, he metastatic colon cancer,  brought back to hospital with dyspnea, weakness, pain, and generalzed decline in performance status. Denies fevers, chills, or rigors. States SOB now better that she is on NC O2. No Mild diffuse abdominal pain. + back pain, unchanged. No urinary symptoms.    Here WBC noted to be elevated and patient tachycardic and tachypneic. CT C/A/P performed (w CTA protocol). No PE on CTA. Marked progression in diffuse metastatic disease involving abdominal LN, liver mass, possible peritoneal studding, and innumerable pulmonary mets. No fevers. Patient placed on vancomycin monotherapy.    PMH/PSH--  Liver metastases  Liver cancer  Colon cancer  History of Osteoporosis  Depression  Hypertension  History of Oophorectomy  Anxiety  Hyperlipemia  Ovary removal, prophylactic  History of colon resection      Allergies-- PCN      Medications--  Antibiotics: vancomycin  IVPB 1000 milliGRAM(s) IV Intermittent every 12 hours    Immunologic:   Other: ALPRAZolam PRN  enoxaparin Injectable  morphine  - Injectable PRN  morphine  - Injectable PRN  morphine  - Injectable PRN  ondansetron   Disintegrating Tablet PRN  sertraline  sodium chloride 0.9%.      Social History--  EtOH: denies  Tobacco: distant  Drug Use: denies    Family/Marital History--    Family history of diabetes mellitus (Father)  No pertinent family history in first degree relatives  Remainder not relevant to clinical concern.      Review of Systems:  A >=10-point review of systems was obtained.     Pertinent positives and negatives--  Constitutional: No fevers. No Chills. No Rigors.   Eyes: denies.   ENMT: denies.   Cardiovascular: No chest pain. No palpitations.  Respiratory: Resolved shortness of breath. No cough.  Gastrointestinal: No nausea or vomiting. No diarrhea or constipation. poor appetite  Genitourinary: denies.   Musculoskeletal: as above  Skin: denies.   Neurologic: denies.   Psychiatric: Pleasant. Appropriate affect.    Review of systems otherwise negative except as previously noted.    Physical Exam--  Vital Signs: T(F): 98 (11-09-17 @ 05:37), Max: 98.2 (11-08-17 @ 15:25)  HR: 100 (11-09-17 @ 05:37)  BP: 109/70 (11-09-17 @ 05:37)  RR: 17 (11-09-17 @ 05:37)  SpO2: 90% (11-09-17 @ 05:37)  Wt(kg): --  General: Nontoxic-appearing Female in no acute distress.  HEENT: Anicteric. Conj pink/moist. + dentures  Neck: Not rigid. No sense of mass.  Port: C/D/I  Nodes: None palpable.  Lungs: Diminished breath sounds bilaterally without rales, wheezing or rhonchi  Heart: Regular rate and rhythm. No Murmur. No rub. No gallop. No palpable thrill.  Abdomen: Bowel sounds present and normoactive. Firm, not rigid. Mild diffuse tenderness elicited. Mildly distended.  Extremities: No cyanosis or clubbing. No edema.   Skin: Warm. Dry. Good turgor. No rash. No vasculitic stigmata.  Psychiatric: Appropriate affect and mood for situation.       Laboratory & Imaging Data--  CBC                        9.3    23.3  )-----------( 289      ( 08 Nov 2017 15:59 )             30.7       Chemistries  11-08    135  |  98  |  14  ----------------------------<  95  3.6   |  26  |  0.66    Ca    8.1<L>      08 Nov 2017 15:59    TPro  6.1  /  Alb  1.4<L>  /  TBili  2.4<H>  /  DBili  x   /  AST  102<H>  /  ALT  42  /  AlkPhos  969<H>  11-08    Urinalysis (11.09.17 @ 07:40)    pH Urine: 6.5    Blood, Urine: Trace    Glucose Qualitative, Urine: Negative    Color: Yellow    Urine Appearance: Slightly Turbid    Bilirubin: Moderate    Ketone - Urine: Trace    Specific Gravity: 1.005    Protein, Urine: 75 mg/dL    Urobilinogen: 12    Nitrite: Negative    Leukocyte Esterase Concentration: Moderate    Bacteria: Moderate    Epithelial Cells: Moderate    Red Blood Cell - Urine: 0-2 /HPF    White Blood Cell - Urine: 26-50    < from: CT Angio Chest w/ IV Cont (11.08.17 @ 17:22) >  EXAM:  CT ANGIO CHEST (W)AW IC                        EXAM:  CT ABDOMEN AND PELVIS IC                        PROCEDURE DATE:  11/08/2017    INTERPRETATION:  Clinical information: Colon cancer with liver metastases  COMPARISON: CT chest/abdomen/pelvis October 16, 2017  PROCEDURE:   CT of the Chest, Abdomen and Pelvis was performed with intravenous   contrast.   Intravenous contrast: 90 ml Omnipaque 350. 10 ml discarded.  Oral contrast: Not administered.  Sagittal and coronal reformats were performed.    FINDINGS:  CHEST:   LOWER NECK: Low-density bilateral thyroid nodules.  AXILLA, MEDIASTINUM AND VASQUEZ: Worsening of mediastinal and hilar   lymphadenopathy; representative measurements include a right lower   paratracheal lymph node (2; 72) measuring 3.3 x 2.4 cm, previously 2.0 x   1.4 cm, and a right hilar lymph node (2; 94) which measures 1.6 x 3.3 cm,   previously 1.0 x 2.3 cm.  VESSELS: Excellent opacification of the pulmonary arterial tree and no   evidence of pulmonary embolism.  HEART: Heart size is normal.No pericardial effusion.   PLEURA: No pleural effusion.  LUNGS AND LARGE AIRWAYS: Innumerable bilateral pulmonary metastases are   worsened in size; a representative nodule in the right upper lobe (2; 97)   measures 1.6 cm, previously 1.1 cm. A left lower lobe nodule (2; 99)   measures 2.5 cm, previously 1.7 cm.  Moderate to severe emphysema.  CHEST WALL:  Unremarkable    ABDOMEN AND PELVIS:  LIVER: Markedly enlarged, with extensive bilobar hepatic metastatic   disease which is worsened; a lateral left lobe lesion (2;250) which   measures 2.3 x 3.0 cm, previously 2.5 x 2.3 cm, and a second slightly   more inferior lesion which measures 2.7 cm (2; 259), previously 1.6 cm.  SPLEEN: Within normal limits.  PANCREAS: Within normal limits.  GALLBLADDER: Within normal limits.  BILE DUCTS: Normal caliber.  ADRENALS: Unchanged nodular thickening of the left adrenal.  KIDNEYS/URETERS: No mass, stone or hydronephrosis.    RETROPERITONEUM: Bulky periportal and retroperitoneal lymphadenopathy,   not significantly changed  VESSELS:  Within normal limits.      BOWEL: Right hemicolectomy. No bowel obstruction or wall thickening.   Diverticulosis without diverticulitis.   PERITONEUM: Small amount of ascites, not significant changed in volume.   Tiny enhancing nodule along the peritoneal lining in the lower right   pelvis.    REPRODUCTIVE ORGANS: Fibroid uterus. Pessary device in the vaginal vault.  BLADDER: Within normal limits    ABDOMINAL WALL: Within normal limits.  BONES: No acute abnormality.    IMPRESSION:   Chest:   No pulmonary embolism.  Significant worsening of mediastinal/hilar lymphadenopathy and diffuse   pulmonary metastatic disease.    Abdomen/pelvis: Worsening of liver metastases. No change in bulky upper   abdominal and retroperitoneal lymphadenopathy.  Small amount of ascites, unchanged. Small nodule along the peritoneal   thickening in the right pelvis may represent carcinomatosis.    AICHA RICE M.D., ATTENDINGRADIOLOGIST  This document has been electronically signed. Nov 8 2017  6:02PM  < end of copied text >
Chart reviewed: Assessment plan is as below Note will be updated as more  patient data is gathered    Patient                                            ANDRE KEYES [facility]   ALLER[facility] pcn  CONTACT Abrahan GONZALEZ [phone] Dtr [id number]   REASON FOR VISIT   Initial evaluation/Pulmonary consultation requested  11/9/2017 by Dr reyes ffvignesh Olson (covering Dr Ann)   Patient examined chart reviewed  HOSPITAL ADMISSION [facility] Dr Reyes 11/8/2017  PATIENT CAME  FROM (if information available)    PATIENT DESCRIPTION CC HPI PMH SOCIAL   71 yo female with pmhx of colon cancer s/p hemicolectomy, liver metastasis, HLD, HTN, anxiety, depression, osteoporosis, oophorectomy who presented with hypoxia, tachycardia, and failure to thrive. Patient recently hospitalized was brought back to hospital with dyspnea, weakness, pain, and generalzed decline in performance status. Denies fevers, chills, or rigors. States SOB now better that she is on NC O2. No Mild diffuse abdominal pain. + back pain, unchanged. No urinary symptoms.    Here WBC noted to be elevated and patient tachycardic and tachypneic. CT C/A/P performed (w CTA protocol). No PE on CTA. Marked progression in diffuse metastatic disease involving abdominal LN, liver mass, possible peritoneal studding, and innumerable pulmonary mets. No fevers. Patient placed on vancomycin monotherapy.  VITALS/LABS  11/9/2017 afeb 100 109/70 17 90%  11/9 W 21.1 Hb 8 Plt 298  11/8/2017 W 23.3 Hb 9.3 Plt 289 INR 2.21 Na 135 K 3.6 CO2 26 Cr .6 G 95   PROBLEM ASSESSMENT PLAN             INFECTION PNEUMONIA  11/8-11/9 W 23.3-21.1  11/9 UA 1005 W 26-50L estrase Mod   11/8/2017 CTA Ch cw 10/16/2017Worsening hilomediast lne 1) R paratrach 3.3 x 2.4 (prev 2 x 1.4) 2) R hilar 1.6 x 3.3 (prev 1X 2.3) 3) No PE 4) Innumerable bl pulm mets worse RUL 1.6 cm lll 2.5 cm 40 extensive bl hepatic mets 2.3 x 3 cm   Leukocytosis may not represent actual infection and may be leukemoid reaction related to cancer and marrow involvement  LACTICEMIA  11/8-11/8 LA 2.7-2.4  possibly from large tumor burden anerobic metabolism  RO CHF  10/22/2017 ECHO n lvsf mild ph mild dd   NC ANEMIA   Hb 9.3-8   RO MI  11/8  11/8 Tr 1 n   ELEVATED LFTS   11/8/2017  (i)  (i) ALT 42   PATIENT DESCRIPTION 11/8/2017 ADMISSION [facility]                                   71 yo female with pmhx of colon cancer s/p hemicolectomy, liver metastasis, HLD, HTN, anxiety, depression, osteoporosis, oophorectomy admitted [facility] 11/8/2017 with SOB CTA neg for PE but showed progressive mets lung and liver                                                                                  HOSPITAL COURSE PLAN  11/8/2017 ADMISSION [facility]         Patient seems to have advanced terminal disease PE has been ruled out and Hospice is being considered Would continue morphine oxygen Abio deferred as per ID        TIME SPENT Over 55 minutes aggregate  care time spent on encounter; activities included   direct patient care, counseling and/or coordinating care reviewing notes, lab data/ imaging , discussion with multidisciplinary team/ patient  /family. Risks, benefits, alternatives  discussed in detail. Proper antibiotic use issues addressed Questions/concerns  were addressed  as  best as possible
Patient is a 72y old  Female who presents with a chief complaint of Decrease PO intake, hypoxia (08 Nov 2017 23:55)      HPI:  Patient is a 73 yo female with pmhx of colon cancer s/p hemicolectomy, liver metastasis, HLD, HTN, anxiety, depression, osteoporosis, oophorectomy who presented with hypoxia, tachycardia, and failure to thrive. Patient presented with daughter at bedside. Per daughter, patient found to be hypoxic at home with O2 saturation in the 70s, and tachycardia HR in the 110s at home for the past day. Was told by oncologist to bring patient to the ER for evaluation. Patient also complains of vague RLQ intermittent abdominal pain for past few weeks. Per daughter, patient's last chemo treatment was 10/15/17. Patient has poor appetite for the past week, recently started on medicinal marijuana. At baseline at home, patient able to sit in chair and watch TV, and ambulate to bathroom with assistance from daughter or . Patient also complain of lower back pain associated with bedsore. Daughter brought patient to urgent care yesterday and xray found lung metastasis. Daughter stated that patient has dark, malodorous urine at home, but UA result at her oncologist yesterday was negative. Currently, patient able to breathe better on NC, and is resting in bed. Denies fever, chills, chest pain, palpitations, cough, nausea, vomiting, diarrhea, constipation, urinary frequency, urgency, or dysuria, headaches, changes in vision, dizziness, numbness, or tingling.    In the ER, patient's is afebrile, 99bpm, 124/58, and 100% O2 on NC. Patient found to have elevated WBC 23.3, low H/H 9.3/30.7. Elevated Alk phos 969, and elevated lactate 2.7. Patient also has elevated , ProBNP 1378. CTA showed no pulmonary embolism, worsening of mediastinal/hilar lymphadenopathy and diffuse pulmonary metastatic disease, worsening of liver metastases, small amount of ascites, small nodule along the peritoneal thickening in the right pelvis may represent carcinomatosis. EKG showed normal SR, prolonged QT. (08 Nov 2017 20:17)       ROS:  Negative except for: has recently been on stivarga but continues with clinical deteriation. Is being treated by Dr. José Miguel Cooper    PAST MEDICAL & SURGICAL HISTORY:  Liver metastases  Liver cancer  Colon cancer  History of Osteoporosis  Depression  Hypertension  History of Oophorectomy  Anxiety  Hyperlipemia  Ovary removal, prophylactic  History of colon resection      SOCIAL HISTORY:    FAMILY HISTORY:  Family history of diabetes mellitus (Father)      MEDICATIONS  (STANDING):  enoxaparin Injectable 40 milliGRAM(s) SubCutaneous every 24 hours  sertraline 100 milliGRAM(s) Oral daily    MEDICATIONS  (PRN):  ALPRAZolam 0.5 milliGRAM(s) Oral two times a day PRN anxiety  morphine  - Injectable 1 milliGRAM(s) IV Push every 4 hours PRN Mild Pain (1 - 3)  morphine  - Injectable 2 milliGRAM(s) IV Push every 4 hours PRN Moderate Pain (4 - 6)  morphine  - Injectable 4 milliGRAM(s) IV Push every 4 hours PRN Severe Pain (7 - 10)  ondansetron   Disintegrating Tablet 4 milliGRAM(s) Oral every 6 hours PRN Nausea and/or Vomiting      Allergies    penicillin (Other)    Intolerances        Vital Signs Last 24 Hrs  T(C): 36.3 (09 Nov 2017 13:00), Max: 36.7 (08 Nov 2017 23:28)  T(F): 97.3 (09 Nov 2017 13:00), Max: 98 (08 Nov 2017 23:28)  HR: 101 (09 Nov 2017 13:00) (90 - 101)  BP: 112/- (09 Nov 2017 13:00) (109/70 - 117/76)  BP(mean): --  RR: 19 (09 Nov 2017 13:00) (16 - 19)  SpO2: 90% (09 Nov 2017 13:00) (90% - 94%)    PHYSICAL EXAM  General: adult in NAD  HEENT: clear oropharynx, anicteric sclera, pink conjunctivae  Neck: supple  CV: normal S1S2 with no murmur rubs or gallops  Lungs: clear to auscultation, no wheezes, no rhales  Abdomen: soft non-tender non-distended, no hepato/splenomegaly  Ext: no clubbing cyanosis or edema  Skin: no rashes and no petichiae  Neuro: alert and oriented X3 no focal deficits      LABS:    CBC Full  -  ( 09 Nov 2017 10:15 )  WBC Count : 21.1 K/uL  Hemoglobin : 8.0 g/dL  Hematocrit : 26.8 %  Platelet Count - Automated : 278 K/uL  Mean Cell Volume : 89.0 fl  Mean Cell Hemoglobin : 26.4 pg  Mean Cell Hemoglobin Concentration : 29.7 gm/dL  Auto Neutrophil # : 18.8 K/uL  Auto Lymphocyte # : 0.7 K/uL  Auto Monocyte # : 1.5 K/uL  Auto Eosinophil # : 0.1 K/uL  Auto Basophil # : 0.1 K/uL  Auto Neutrophil % : 89.0 %  Auto Lymphocyte % : 3.2 %  Auto Monocyte % : 7.2 %  Auto Eosinophil % : 0.3 %  Auto Basophil % : 0.4 %    11-09    137  |  100  |  11  ----------------------------<  79  3.3<L>   |  28  |  0.52    Ca    7.6<L>      09 Nov 2017 10:15    TPro  5.4<L>  /  Alb  1.2<L>  /  TBili  2.1<H>  /  DBili  x   /  AST  77<H>  /  ALT  35  /  AlkPhos  824<H>  11-09    PT/INR - ( 08 Nov 2017 15:59 )   PT: 24.5 sec;   INR: 2.21 ratio                   BLOOD SMEAR INTERPRETATION:    RADIOLOGY & ADDITIONAL STUDIES:    < from: CT Angio Chest w/ IV Cont (11.08.17 @ 17:22) >  EXAM:  CT ANGIO CHEST (W)AW IC                          EXAM:  CT ABDOMEN AND PELVIS IC                            PROCEDURE DATE:  11/08/2017          INTERPRETATION:  Clinical information: Colon cancer with liver metastases    COMPARISON: CT chest/abdomen/pelvis October 16, 2017    PROCEDURE:   CT of the Chest, Abdomen and Pelvis was performed with intravenous   contrast.   Intravenous contrast: 90 ml Omnipaque 350. 10 ml discarded.  Oral contrast: Not administered.  Sagittal and coronal reformats were performed.    FINDINGS:    CHEST:     LOWER NECK: Low-density bilateral thyroid nodules.  AXILLA, MEDIASTINUM AND VASQUEZ: Worsening of mediastinal and hilar   lymphadenopathy; representative measurements include a right lower   paratracheal lymph node (2; 72) measuring 3.3 x 2.4 cm, previously 2.0 x   1.4 cm, and a right hilar lymph node (2; 94) which measures 1.6 x 3.3 cm,   previously 1.0 x 2.3 cm.  VESSELS: Excellent opacification of the pulmonary arterial tree and no   evidence of pulmonary embolism.  HEART: Heart size is normal.No pericardial effusion.   PLEURA: No pleural effusion.  LUNGS AND LARGE AIRWAYS: Innumerable bilateral pulmonary metastases are   worsened in size; a representative nodule in the right upper lobe (2; 97)   measures 1.6 cm, previously 1.1 cm. A left lower lobe nodule (2; 99)   measures 2.5 cm, previously 1.7 cm.  Moderate to severe emphysema.  CHEST WALL:  Unremarkable    ABDOMEN AND PELVIS:    LIVER: Markedly enlarged, with extensive bilobar hepatic metastatic   disease which is worsened; a lateral left lobe lesion (2;250) which   measures 2.3 x 3.0 cm, previously 2.5 x 2.3 cm, and a second slightly   more inferior lesion which measures 2.7 cm (2; 259), previously 1.6 cm.  SPLEEN: Within normal limits.  PANCREAS: Within normal limits.  GALLBLADDER: Within normal limits.  BILE DUCTS: Normal caliber.  ADRENALS: Unchanged nodular thickening of the left adrenal.  KIDNEYS/URETERS: No mass, stone or hydronephrosis.    RETROPERITONEUM: Bulky periportal and retroperitoneal lymphadenopathy,   not significantly changed  VESSELS:  Within normal limits.      BOWEL: Right hemicolectomy. No bowel obstruction or wall thickening.   Diverticulosis without diverticulitis.   PERITONEUM: Small amount of ascites, not significant changed in volume.   Tiny enhancing nodule along the peritoneal lining in the lower right   pelvis.    REPRODUCTIVE ORGANS: Fibroid uterus. Pessary device in the vaginal vault.  BLADDER: Within normal limits    ABDOMINAL WALL: Within normal limits.  BONES: No acute abnormality.    IMPRESSION:   Chest:   No pulmonary embolism.  Significant worsening of mediastinal/hilar lymphadenopathy and diffuse   pulmonary metastatic disease.    Abdomen/pelvis: Worsening of liver metastases. No change in bulky upper   abdominal and retroperitoneal lymphadenopathy.  Small amount of ascites, unchanged. Small nodule along the peritoneal   thickening in the right pelvis may represent carcinomatosis.                  AICHA RICE M.D., ATTENDINGRADIOLOGIST  This document has been electronically signed. Nov 8 2017  6:02PM        < end of copied text >
[facility]         Zac Inman, Yobani, Jack, Lea, Alejandra Barksdale        [phone] (office)    CHIEF COMPLAINT: Patient is a 72y old  Female who presents with a chief complaint of     HPI: 72 f colon cancer s/p resection, liver mets, anxiety, benign ovarian tumor, and HLD, last seen here several weeks ago for nausea and vomiting.   EKG with nonspecific abnormalities at that time.  We followed her though that hospitalization.  Echo was performed revealed no significant abnormalities on a limited study.  She was eventually discharged to home.    She now presents having been experiencing dyspnea, hypoxia and hypotension with tachycardia.  There was discussion about getting outpt oxygen and it was felt best that this be handled through the ed.  She is also quite weak.  Denies fever, cp.  Has been constipated and having abdominal pain.  No edema.      PAST MEDICAL & SURGICAL HISTORY:  Liver metastases  Liver cancer  Colon cancer  History of Osteoporosis  Depression  Hypertension  History of Oophorectomy  Anxiety  Hyperlipemia  Ovary removal, prophylactic  History of colon resection      SOCIAL HISTORY: No active tobacco, alcohol or illicit drug use    FAMILY HISTORY:  Family history of diabetes mellitus (Mother)      Outpatient medications:    MEDICATIONS  (STANDING):    MEDICATIONS  (PRN):      Allergies    penicillin (Other)    Intolerances        REVIEW OF SYSTEMS: Is negative for eye, ENT, GI, , allergic, dermatologic, musculoskeletal and neurologic, except as described above.    VITAL SIGNS:   Vital Signs Last 24 Hrs  T(C): 36.4 (08 Nov 2017 14:43), Max: 36.4 (08 Nov 2017 14:43)  T(F): 97.5 (08 Nov 2017 14:43), Max: 97.5 (08 Nov 2017 14:43)  HR: 114 (08 Nov 2017 14:43) (114 - 114)  BP: 114/69 (08 Nov 2017 14:43) (114/69 - 114/69)  BP(mean): --  RR: 22 (08 Nov 2017 14:43) (22 - 22)  SpO2: 99% (08 Nov 2017 14:43) (99% - 99%)    I&O's Summary      PHYSICAL EXAM:    Constitutional: NAD, awake and alert, well-developed  Eyes:  EOMI, no oral cyanosis, conjunctivae clear, anicteric.  Pulmonary: Non-labored, breath sounds are decr right base to 1/3  Cardiovascular:  regular S1 and S2. No murmur.  No rubs, gallops or clicks  Gastrointestinal: Bowel Sounds present, soft, nontender.   Lymph: No peripheral edema.   Neurological: Alert, strength and sensitivity are grossly intact  Skin: No obvious lesions/rashes.   Psych:  Mood & affect appropriate .    LABS: All Labs Reviewed:          PT/INR - ( 08 Nov 2017 15:59 )   PT: 24.5 sec;   INR: 2.21 ratio               Blood Culture:         RADIOLOGY:    EKG:  sr 97, nonspec stt

## 2017-11-09 NOTE — SWALLOW BEDSIDE ASSESSMENT ADULT - COMMENTS
Patient is a 71 yo female with pmhx of colon cancer s/p hemicolectomy, liver metastasis, HLD, HTN, anxiety, depression, osteoporosis, oophorectomy who presented with hypoxia, tachycardia, and failure to thrive. patient found to be hypoxic at home with O2 saturation in the 70s, and tachycardia HR in the 110s at home PTA  Pt awake, but lethargic c spouse present bedside  Pt c reduced mastication of soft / chopped trials Pt tolerated thin liquids c no overt si/sx of aspiration

## 2017-11-09 NOTE — GOALS OF CARE CONVERSATION - PERSONAL ADVANCE DIRECTIVE - NS PRO AD PATIENT TYPE
Health Care Proxy (HCP) Do Not Resuscitate (DNR)/Health Care Proxy (HCP)/Medical Orders for Life-Sustaining Treatment (MOLST)

## 2017-11-09 NOTE — DIETITIAN INITIAL EVALUATION ADULT. - ETIOLOGY
related to inadequate protein energy intake with increased protein energy needs in setting of metastatic Ca

## 2017-11-09 NOTE — DIETITIAN INITIAL EVALUATION ADULT. - NS AS NUTRI INTERV MEDICAL AND FOOD SUPPLEMENTS
Recommend ensure enlive 8oz BID (vanilla or chocolate) plus ensure pudding 4oz po BID (vanilla or chocolate)./Commercial beverage

## 2017-11-09 NOTE — CHART NOTE - NSCHARTNOTEFT_GEN_A_CORE
Called by RN because patient had several O2 sats in the 80s on remote tele monitor.  Patient seen and examined. Sleeping comfortably, on nasal cannula, breathing through mouth. Denied shortness of breath, weakness, headache, chest pain.     VS Tmax 97.1 /72  RR 18 O2 sat 84 on 5 L NC  Physical exam:   General: NAD  Neuro: asleep, but easily awakened, AAOX3  Cardio: tachycardic, regular rhythm, no murmurs, gallops, rubs  Pulm: decreased breath sounds at bases  abdomen: soft, nontender, nondistended,+bs  LE: no C/C/E, +DP pulses    A/P 72 year old female PMH colon ca s/p hemicolectomy, liver mets admitted with hypoxia, tachycardia, and FTT 2/2 to progressive mets to lungs  venti mask  continue to monitor on remote tele

## 2017-11-09 NOTE — GOALS OF CARE CONVERSATION - PERSONAL ADVANCE DIRECTIVE - NS PRO AD PATIENT TYPE ON CHART
Health Care Proxy (HCP) molst completed 11/10/17/Do Not Resuscitate (DNR)/Health Care Proxy (HCP)/Medical Orders for Life-Sustaining Treatment (MOLST)

## 2017-11-09 NOTE — DIETITIAN INITIAL EVALUATION ADULT. - NUTRITION INTERVENTIONS
juice, applesauce, zofia pudding, egg salad, tunafish salad , oatmeal/food preferences as requested by patient (specify)/nutrient dense snacks/other

## 2017-11-10 VITALS — OXYGEN SATURATION: 93 % | RESPIRATION RATE: 18 BRPM

## 2017-11-10 LAB
ALBUMIN SERPL ELPH-MCNC: 1.3 G/DL — LOW (ref 3.3–5)
ALP SERPL-CCNC: 901 U/L — HIGH (ref 40–120)
ALT FLD-CCNC: 40 U/L — SIGNIFICANT CHANGE UP (ref 12–78)
ANION GAP SERPL CALC-SCNC: 10 MMOL/L — SIGNIFICANT CHANGE UP (ref 5–17)
AST SERPL-CCNC: 89 U/L — HIGH (ref 15–37)
BILIRUB SERPL-MCNC: 2.4 MG/DL — HIGH (ref 0.2–1.2)
BUN SERPL-MCNC: 13 MG/DL — SIGNIFICANT CHANGE UP (ref 7–23)
CALCIUM SERPL-MCNC: 8.3 MG/DL — LOW (ref 8.5–10.1)
CHLORIDE SERPL-SCNC: 102 MMOL/L — SIGNIFICANT CHANGE UP (ref 96–108)
CO2 SERPL-SCNC: 26 MMOL/L — SIGNIFICANT CHANGE UP (ref 22–31)
CREAT SERPL-MCNC: 0.61 MG/DL — SIGNIFICANT CHANGE UP (ref 0.5–1.3)
CULTURE RESULTS: SIGNIFICANT CHANGE UP
EOSINOPHIL NFR BLD AUTO: 1 % — SIGNIFICANT CHANGE UP (ref 0–6)
GLUCOSE SERPL-MCNC: 106 MG/DL — HIGH (ref 70–99)
HCT VFR BLD CALC: 27.2 % — LOW (ref 34.5–45)
HGB BLD-MCNC: 8.2 G/DL — LOW (ref 11.5–15.5)
LYMPHOCYTES # BLD AUTO: 3 % — LOW (ref 13–44)
MCHC RBC-ENTMCNC: 26.5 PG — LOW (ref 27–34)
MCHC RBC-ENTMCNC: 30 GM/DL — LOW (ref 32–36)
MCV RBC AUTO: 88.6 FL — SIGNIFICANT CHANGE UP (ref 80–100)
MONOCYTES NFR BLD AUTO: 6 % — SIGNIFICANT CHANGE UP (ref 1–9)
NEUTROPHILS NFR BLD AUTO: 90 % — HIGH (ref 43–77)
PLATELET # BLD AUTO: 318 K/UL — SIGNIFICANT CHANGE UP (ref 150–400)
POTASSIUM SERPL-MCNC: 4 MMOL/L — SIGNIFICANT CHANGE UP (ref 3.5–5.3)
POTASSIUM SERPL-SCNC: 4 MMOL/L — SIGNIFICANT CHANGE UP (ref 3.5–5.3)
PROT SERPL-MCNC: 5.7 G/DL — LOW (ref 6–8.3)
RBC # BLD: 3.08 M/UL — LOW (ref 3.8–5.2)
RBC # FLD: 20.4 % — HIGH (ref 10.3–14.5)
SODIUM SERPL-SCNC: 138 MMOL/L — SIGNIFICANT CHANGE UP (ref 135–145)
SPECIMEN SOURCE: SIGNIFICANT CHANGE UP
WBC # BLD: 27.6 K/UL — HIGH (ref 3.8–10.5)
WBC # FLD AUTO: 27.6 K/UL — HIGH (ref 3.8–10.5)

## 2017-11-10 PROCEDURE — 99232 SBSQ HOSP IP/OBS MODERATE 35: CPT

## 2017-11-10 PROCEDURE — 99233 SBSQ HOSP IP/OBS HIGH 50: CPT

## 2017-11-10 RX ORDER — ATROPINE SULFATE 1 %
2 DROPS OPHTHALMIC (EYE)
Qty: 0 | Refills: 0 | Status: DISCONTINUED | OUTPATIENT
Start: 2017-11-10 | End: 2017-11-11

## 2017-11-10 RX ORDER — ONDANSETRON 8 MG/1
4 TABLET, FILM COATED ORAL EVERY 4 HOURS
Qty: 0 | Refills: 0 | Status: DISCONTINUED | OUTPATIENT
Start: 2017-11-10 | End: 2017-11-11

## 2017-11-10 RX ORDER — MORPHINE SULFATE 50 MG/1
2 CAPSULE, EXTENDED RELEASE ORAL
Qty: 0 | Refills: 0 | Status: DISCONTINUED | OUTPATIENT
Start: 2017-11-10 | End: 2017-11-11

## 2017-11-10 RX ORDER — MORPHINE SULFATE 50 MG/1
1 CAPSULE, EXTENDED RELEASE ORAL EVERY 4 HOURS
Qty: 0 | Refills: 0 | Status: DISCONTINUED | OUTPATIENT
Start: 2017-11-10 | End: 2017-11-10

## 2017-11-10 RX ORDER — OXYCODONE HYDROCHLORIDE 5 MG/1
2.5 TABLET ORAL EVERY 4 HOURS
Qty: 0 | Refills: 0 | Status: DISCONTINUED | OUTPATIENT
Start: 2017-11-10 | End: 2017-11-10

## 2017-11-10 RX ORDER — OXYCODONE HYDROCHLORIDE 5 MG/1
2.5 TABLET ORAL EVERY 4 HOURS
Qty: 0 | Refills: 0 | Status: DISCONTINUED | OUTPATIENT
Start: 2017-11-10 | End: 2017-11-11

## 2017-11-10 RX ORDER — OLANZAPINE 15 MG/1
2.5 TABLET, FILM COATED ORAL ONCE
Qty: 0 | Refills: 0 | Status: COMPLETED | OUTPATIENT
Start: 2017-11-10 | End: 2017-11-10

## 2017-11-10 RX ADMIN — OXYCODONE HYDROCHLORIDE 2.5 MILLIGRAM(S): 5 TABLET ORAL at 18:41

## 2017-11-10 RX ADMIN — MORPHINE SULFATE 2 MILLIGRAM(S): 50 CAPSULE, EXTENDED RELEASE ORAL at 23:13

## 2017-11-10 RX ADMIN — MORPHINE SULFATE 2 MILLIGRAM(S): 50 CAPSULE, EXTENDED RELEASE ORAL at 09:59

## 2017-11-10 RX ADMIN — OLANZAPINE 2.5 MILLIGRAM(S): 15 TABLET, FILM COATED ORAL at 04:06

## 2017-11-10 RX ADMIN — Medication 1 MILLIGRAM(S): at 19:50

## 2017-11-10 RX ADMIN — MORPHINE SULFATE 2 MILLIGRAM(S): 50 CAPSULE, EXTENDED RELEASE ORAL at 22:57

## 2017-11-10 RX ADMIN — MORPHINE SULFATE 2 MILLIGRAM(S): 50 CAPSULE, EXTENDED RELEASE ORAL at 10:20

## 2017-11-10 RX ADMIN — MORPHINE SULFATE 2 MILLIGRAM(S): 50 CAPSULE, EXTENDED RELEASE ORAL at 04:12

## 2017-11-10 RX ADMIN — OXYCODONE HYDROCHLORIDE 2.5 MILLIGRAM(S): 5 TABLET ORAL at 17:12

## 2017-11-10 RX ADMIN — MORPHINE SULFATE 2 MILLIGRAM(S): 50 CAPSULE, EXTENDED RELEASE ORAL at 04:42

## 2017-11-10 RX ADMIN — ENOXAPARIN SODIUM 40 MILLIGRAM(S): 100 INJECTION SUBCUTANEOUS at 06:01

## 2017-11-10 RX ADMIN — MORPHINE SULFATE 2 MILLIGRAM(S): 50 CAPSULE, EXTENDED RELEASE ORAL at 00:20

## 2017-11-10 RX ADMIN — ONDANSETRON 4 MILLIGRAM(S): 8 TABLET, FILM COATED ORAL at 09:46

## 2017-11-10 NOTE — PROGRESS NOTE ADULT - SUBJECTIVE AND OBJECTIVE BOX
Patient is a 72y old  Female who presents with a chief complaint of Decrease PO intake, hypoxia (08 Nov 2017 23:55)    HPI:  Patient is a 73 yo female with pmhx of colon cancer s/p hemicolectomy, liver metastasis, HLD, HTN, anxiety, depression, osteoporosis, oophorectomy who presented with hypoxia, tachycardia, and failure to thrive. Patient presented with daughter at bedside. Per daughter, patient found to be hypoxic at home with O2 saturation in the 70s, and tachycardia HR in the 110s at home for the past day. Was told by oncologist to bring patient to the ER for evaluation. Patient also complains of vague RLQ intermittent abdominal pain for past few weeks. Per daughter, patient's last chemo treatment was 10/15/17. Patient has poor appetite for the past week, recently started on medicinal marijuana. At baseline at home, patient able to sit in chair and watch TV, and ambulate to bathroom with assistance from daughter or . Patient also complain of lower back pain associated with bedsore. Daughter brought patient to urgent care yesterday and xray found lung metastasis. Daughter stated that patient has dark, malodorous urine at home, but UA result at her oncologist yesterday was negative. Currently, patient able to breathe better on NC, and is resting in bed. Denies fever, chills, chest pain, palpitations, cough, nausea, vomiting, diarrhea, constipation, urinary frequency, urgency, or dysuria, headaches, changes in vision, dizziness, numbness, or tingling.    In the ER, patient's is afebrile, 99bpm, 124/58, and 100% O2 on NC. Patient found to have elevated WBC 23.3, low H/H 9.3/30.7. Elevated Alk phos 969, and elevated lactate 2.7. Patient also has elevated , ProBNP 1378. CTA showed no pulmonary embolism, worsening of mediastinal/hilar lymphadenopathy and diffuse pulmonary metastatic disease, worsening of liver metastases, small amount of ascites, small nodule along the peritoneal thickening in the right pelvis may represent carcinomatosis. EKG showed normal SR, prolonged QT. (08 Nov 2017 20:17)    INTERVAL HPI/OVERNIGHT EVENTS: Pt seen and examined at bedside w/  present. Overnight she experienced multiple episodes of oxygen desaturation to the low 80s. She was placed on a Venti mask FiO2 of 50 and has been saturating in the low 90s. Pt HR has been low 100s. She denies chest pain or palpitations but is visible struggling for air. Additionally she has not been eating. She continues to state she has RLQ abdominal pain.     MEDICATIONS  (STANDING):  enoxaparin Injectable 40 milliGRAM(s) SubCutaneous every 24 hours  sertraline 100 milliGRAM(s) Oral daily    MEDICATIONS  (PRN):  ALPRAZolam 0.5 milliGRAM(s) Oral two times a day PRN anxiety  morphine  - Injectable 1 milliGRAM(s) IV Push every 4 hours PRN Mild Pain (1 - 3)  morphine  - Injectable 2 milliGRAM(s) IV Push every 4 hours PRN Moderate Pain (4 - 6)  morphine  - Injectable 4 milliGRAM(s) IV Push every 4 hours PRN Severe Pain (7 - 10)  ondansetron   Disintegrating Tablet 4 milliGRAM(s) Oral every 6 hours PRN Nausea and/or Vomiting    Allergies  penicillin (Other)    REVIEW OF SYSTEMS:  CONSTITUTIONAL: No fever, endorses fatigue  EYES: No eye pain, visual disturbances, or discharge  ENMT:  No difficulty hearing, tinnitus, vertigo; No sinus or throat pain  NECK: No pain or stiffness  RESPIRATORY: No cough, wheezing, chills or hemoptysis; No shortness of breath  CARDIOVASCULAR: No chest pain, palpitations, dizziness, or leg swelling  GASTROINTESTINAL: RLQ abdominal pain No nausea, vomiting, or hematemesis; No diarrhea or constipation. No melena or hematochezia.  GENITOURINARY: No dysuria, frequency, hematuria, or incontinence  NEUROLOGICAL: No headaches, memory loss, loss of strength, numbness, or tremors  SKIN: No itching, burning, rashes, or lesions   LYMPH NODES: No enlarged glands  ENDOCRINE: No heat or cold intolerance; No hair loss; No polydipsia or polyuria  MUSCULOSKELETAL: No joint pain or swelling; No muscle, back, or extremity pain  PSYCHIATRIC: No depression, anxiety, mood swings, or difficulty sleeping  HEME/LYMPH: No easy bruising, or bleeding gums  ALLERGY AND IMMUNOLOGIC: No hives or eczema    Vital Signs Last 24 Hrs  T(C): 36.4 (10 Nov 2017 05:07), Max: 36.4 (10 Nov 2017 05:07)  T(F): 97.6 (10 Nov 2017 05:07), Max: 97.6 (10 Nov 2017 05:07)  HR: 103 (10 Nov 2017 05:07) (101 - 103)  BP: 120/73 (10 Nov 2017 05:07) (110/72 - 120/73)  RR: 17 (10 Nov 2017 05:07) (17 - 19)  SpO2: 90% (10 Nov 2017 05:07) (84% - 90%)    PHYSICAL EXAM:  GENERAL: No acute distress, frail, undernourished  HEAD:  Atraumatic, Normocephalic  EYES: EOMI, PERRL, conjunctiva and sclera clear  ENMT: No tonsillar erythema, exudates, or enlargement; Moist mucous membranes  NECK: Supple, No Jugular Venous Distension, Normal thyroid  NERVOUS SYSTEM:  Alert & Oriented X3, Good concentration  CHEST/LUNG: decreased breath sounds lower lobes bilaterally; No rales, rhonchi, wheezing, or rubs; struggling to breath on venti mask  HEART: Regular rate and rhythm; No murmurs, rubs, or gallops  ABDOMEN: Soft, Nontender, Nondistended; Bowel sounds present  EXTREMITIES:  2+ Peripheral Pulses, No clubbing, cyanosis, or edema; port noted   LYMPH: No lymphadenopathy noted  SKIN: No rashes or lesions    LABS:                        8.0    21.1  )-----------( 278      ( 09 Nov 2017 10:15 )             26.8     09 Nov 2017 10:15    137    |  100    |  11     ----------------------------<  79     3.3     |  28     |  0.52     Ca    7.6        09 Nov 2017 10:15    TPro  5.4    /  Alb  1.2    /  TBili  2.1    /  DBili  x      /  AST  77     /  ALT  35     /  AlkPhos  824    09 Nov 2017 10:15    PT/INR - ( 08 Nov 2017 15:59 )   PT: 24.5 sec;   INR: 2.21 ratio      .Blood Blood-Peripheral  11-08 @ 21:16   No growth to date.  --  --    RADIOLOGY & ADDITIONAL TESTS:    Imaging Personally Reviewed:  [x ] YES     Consultant(s) Notes Reviewed:      Care Discussed with Consultants/Other Providers: Patient is a 72y old  Female who presents with a chief complaint of Decrease PO intake, hypoxia (08 Nov 2017 23:55)    HPI:  Patient is a 71 yo female with pmhx of colon cancer s/p hemicolectomy, liver metastasis, HLD, HTN, anxiety, depression, osteoporosis, oophorectomy who presented with hypoxia, tachycardia, and failure to thrive. Patient presented with daughter at bedside. Per daughter, patient found to be hypoxic at home with O2 saturation in the 70s, and tachycardia HR in the 110s at home for the past day. Was told by oncologist to bring patient to the ER for evaluation. Patient also complains of vague RLQ intermittent abdominal pain for past few weeks. Per daughter, patient's last chemo treatment was 10/15/17. Patient has poor appetite for the past week, recently started on medicinal marijuana. At baseline at home, patient able to sit in chair and watch TV, and ambulate to bathroom with assistance from daughter or . Patient also complain of lower back pain associated with bedsore. Daughter brought patient to urgent care yesterday and xray found lung metastasis. Daughter stated that patient has dark, malodorous urine at home, but UA result at her oncologist yesterday was negative. Currently, patient able to breathe better on NC, and is resting in bed. Denies fever, chills, chest pain, palpitations, cough, nausea, vomiting, diarrhea, constipation, urinary frequency, urgency, or dysuria, headaches, changes in vision, dizziness, numbness, or tingling.    In the ER, patient's is afebrile, 99bpm, 124/58, and 100% O2 on NC. Patient found to have elevated WBC 23.3, low H/H 9.3/30.7. Elevated Alk phos 969, and elevated lactate 2.7. Patient also has elevated , ProBNP 1378. CTA showed no pulmonary embolism, worsening of mediastinal/hilar lymphadenopathy and diffuse pulmonary metastatic disease, worsening of liver metastases, small amount of ascites, small nodule along the peritoneal thickening in the right pelvis may represent carcinomatosis. EKG showed normal SR, prolonged QT. (08 Nov 2017 20:17)    INTERVAL HPI/OVERNIGHT EVENTS: Pt seen and examined at bedside w/  present. Overnight she experienced multiple episodes of oxygen desaturation to the low 80s. She was placed on a Venti mask FiO2 of 50 and has been saturating in the low 90s. Pt HR has been low 100s. She denies chest pain or palpitations but is visible struggling for air. Additionally she has not been eating. She continues to state she has RLQ abdominal pain.     MEDICATIONS  (STANDING):  enoxaparin Injectable 40 milliGRAM(s) SubCutaneous every 24 hours  sertraline 100 milliGRAM(s) Oral daily    MEDICATIONS  (PRN):  ALPRAZolam 0.5 milliGRAM(s) Oral two times a day PRN anxiety  morphine  - Injectable 1 milliGRAM(s) IV Push every 4 hours PRN Mild Pain (1 - 3)  morphine  - Injectable 2 milliGRAM(s) IV Push every 4 hours PRN Moderate Pain (4 - 6)  morphine  - Injectable 4 milliGRAM(s) IV Push every 4 hours PRN Severe Pain (7 - 10)  ondansetron   Disintegrating Tablet 4 milliGRAM(s) Oral every 6 hours PRN Nausea and/or Vomiting    Allergies  penicillin (Other)    REVIEW OF SYSTEMS:  CONSTITUTIONAL: No fever, endorses fatigue  EYES: No eye pain, visual disturbances, or discharge  ENMT:  No difficulty hearing, tinnitus, vertigo; No sinus or throat pain  NECK: No pain or stiffness  RESPIRATORY: No cough, wheezing, chills or hemoptysis; No shortness of breath  CARDIOVASCULAR: No chest pain, palpitations, dizziness, or leg swelling  GASTROINTESTINAL: RLQ abdominal pain No nausea, vomiting, or hematemesis; No diarrhea or constipation. No melena or hematochezia.  GENITOURINARY: No dysuria, frequency, hematuria, or incontinence  NEUROLOGICAL: No headaches, memory loss, loss of strength, numbness, or tremors  SKIN: No itching, burning, rashes, or lesions   LYMPH NODES: No enlarged glands  ENDOCRINE: No heat or cold intolerance; No hair loss; No polydipsia or polyuria  MUSCULOSKELETAL: No joint pain or swelling; No muscle, back, or extremity pain  PSYCHIATRIC: No depression, anxiety, mood swings, or difficulty sleeping  HEME/LYMPH: No easy bruising, or bleeding gums  ALLERGY AND IMMUNOLOGIC: No hives or eczema    Vital Signs Last 24 Hrs  T(C): 36.4 (10 Nov 2017 05:07), Max: 36.4 (10 Nov 2017 05:07)  T(F): 97.6 (10 Nov 2017 05:07), Max: 97.6 (10 Nov 2017 05:07)  HR: 103 (10 Nov 2017 05:07) (101 - 103)  BP: 120/73 (10 Nov 2017 05:07) (110/72 - 120/73)  RR: 17 (10 Nov 2017 05:07) (17 - 19)  SpO2: 90% (10 Nov 2017 05:07) (84% - 90%)    PHYSICAL EXAM:  GENERAL: No acute distress, frail, undernourished  HEAD:  Atraumatic, Normocephalic  EYES: EOMI, PERRL, conjunctiva and sclera clear  ENMT: No tonsillar erythema, exudates, or enlargement; Moist mucous membranes  NECK: Supple, No Jugular Venous Distension, Normal thyroid  NERVOUS SYSTEM:  Alert & Oriented X3, Good concentration  CHEST/LUNG: decreased breath sounds lower lobes bilaterally; No rales, rhonchi, wheezing, or rubs; struggling to breath on venti mask  HEART: Regular rate and rhythm; No murmurs, rubs, or gallops  ABDOMEN: Soft, Nontender, Nondistended; Bowel sounds present  EXTREMITIES:  2+ Peripheral Pulses, No clubbing, cyanosis, or edema; port noted   LYMPH: No lymphadenopathy noted  SKIN: No rashes or lesions    LABS:             11-10    138  |  102  |  13  ----------------------------<  106<H>  4.0   |  26  |  0.61    Ca    8.3<L>      10 Nov 2017 07:23    TPro  5.7<L>  /  Alb  1.3<L>  /  TBili  2.4<H>  /  DBili  x   /  AST  89<H>  /  ALT  40  /  AlkPhos  901<H>  11-10                          8.2    27.6  )-----------( 318      ( 10 Nov 2017 07:23 )             27.2     .Blood Blood-Peripheral  11-08 @ 21:16   No growth to date.  --  --      RADIOLOGY & ADDITIONAL TESTS:    Imaging Personally Reviewed:  [x ] YES     Consultant(s) Notes Reviewed:      Care Discussed with Consultants/Other Providers:

## 2017-11-10 NOTE — PROGRESS NOTE ADULT - PROBLEM SELECTOR PLAN 2
Currently stable on supplemental O2  FU cardiology  CTA showed lung metastasis   FU palliative and hospice consult
- colon cancer with metastasis to liver and lungs  - speech and swallow eval  - consult dietary
Continue Venti mask w/ goal spO2 >90  FU cardiology recommendations  CTA revealed lung metastasis   FU palliative and hospice consult

## 2017-11-10 NOTE — PROGRESS NOTE ADULT - PROBLEM SELECTOR PLAN 1
FU BCx/UCx  Unlikely sepsis and more likely reaction to metastatic disease  Hold antibiotics at this time  FU ID
FU BCx/UCx  Unlikely sepsis and more likely reaction to metastatic disease  Hold antibiotics at this time  FU ID
sepsis ruled out,   acute respiraotry failure secondary to infiltrative pulmonary metastatic process  failed chemo  on nasal canula, comfortable  spoke to family, discussed in length about advanced directive, agreeable for hospice eval  would need home 02  d/c abx  d/c planning to home once arrangment are made for home

## 2017-11-10 NOTE — PROGRESS NOTE ADULT - SUBJECTIVE AND OBJECTIVE BOX
Chart reviewed: Assessment plan is as below Note will be updated as more  patient data is gathered     VITALS/LABS  11/10/2017 afeb 103 120/70 17 90%   11/10/2017 Labs pending   11/9 W 21.1 Hb 8 Plt 298  11/8/2017 W 23.3 Hb 9.3 Plt 289 INR 2.21 Na 135 K 3.6 CO2 26 Cr .6 G 95   PROBLEM ASSESSMENT PLAN             INFECTION PNEUMONIA  11/8-11/9 W 23.3-21.1  11/9 UA 1005 W 26-50L estrase Mod   11/8/2017 CTA Ch cw 10/16/2017Worsening hilomediast lne 1) R paratrach 3.3 x 2.4 (prev 2 x 1.4) 2) R hilar 1.6 x 3.3 (prev 1X 2.3) 3) No PE 4) Innumerable bl pulm mets worse RUL 1.6 cm lll 2.5 cm 40 extensive bl hepatic mets 2.3 x 3 cm   Leukocytosis may not represent actual infection and may be leukemoid reaction related to cancer and marrow involvement  LACTICEMIA  11/8-11/8 LA 2.7-2.4  possibly from large tumor burden anerobic metabolism  RO CHF  10/22/2017 ECHO n lvsf mild ph mild dd   NC ANEMIA   Hb 9.3-8   RO MI  11/8  11/8 Tr 1 n   ELEVATED LFTS   11/8/2017  (i)  (i) ALT 42   GLOBAL ISSUE/BEST PRACTICE:        PROBLEM:      Analgesia:     morphine 1.6p (11/9) xanax .5x2 p (11/()                         PROBLEM: Sedation:     na               PROBLEM: HOB elevation:   na             PROBLEM: Stress ulcer proph:    na                      PROBLEM: VTE prophylaxis:      lvnx  40 (11/8)                  PROBLEM: Glycemic control:    na   PROBLEM: Nutrition:    na          PROBLEM: Advanced directive: na     PROBLEM: Allergies:  na  PATIENT DESCRIPTION 11/8/2017 ADMISSION [facility]                                   73 yo female with pmhx of colon cancer s/p hemicolectomy, liver metastasis, HLD, HTN, anxiety, depression, osteoporosis, oophorectomy admitted [facility] 11/8/2017 with SOB CTA neg for PE but showed progressive mets lung and liver                                                                                  HOSPITAL COURSE PLAN  11/8/2017 ADMISSION [facility]         Patient seems to have advanced terminal disease PE has been ruled out and Hospice is being considered Would continue morphine oxygen Abio deferred as per ID Home O2 and other arrangements being made for possible DC home possibly on hospice  (11/10)         TIME SPENT Over 25 minutes aggregate  care time spent on encounter; activities included   direct patient care, counseling and/or coordinating care reviewing notes, lab data/ imaging , discussion with multidisciplinary team/ patient  /family. Risks, benefits, alternatives  discussed in detail. Proper antibiotic use issues addressed Questions/concerns  were addressed  as  best as possible

## 2017-11-10 NOTE — PROGRESS NOTE ADULT - PROBLEM SELECTOR PLAN 7
Cont xanax PRN
- dvt ppx lovenox   - speech and swallow eval for failure to thrive  - PT eval
Cont xanax PRN

## 2017-11-10 NOTE — PROGRESS NOTE ADULT - PROBLEM SELECTOR PLAN 8
DVT ppx lovenox   Speech and swallow eval for failure to thrive  PT eval
DVT ppx lovenox   Speech and swallow eval for failure to thrive  PT eval

## 2017-11-10 NOTE — PROGRESS NOTE ADULT - PROBLEM SELECTOR PLAN 3
Colon cancer with metastasis to liver and lungs  FU Speech and swallow eval  FU dietary consult
- colon cancer with metastasis to liver and lungs  - CTA showed nodules along peritoneal thickening in Right Pelvis, carcinomatosis  - consider palliative consult, patient's Karnofsky score 20-30 with known metastatic disease  hemonc eval
Colon cancer with metastasis to liver and lungs  Dysphagia I Diet  Ensure pudding and shakes

## 2017-11-10 NOTE — PROGRESS NOTE ADULT - ATTENDING COMMENTS
Chart reviewed  Pt seen and examined  Agree with plan as outlined above
71 yo female with pmhx of colon cancer s/p hemicolectomy, liver metastasis, HLD, HTN, anxiety, depression, osteoporosis, oophorectomy who presented acute respiratory failure secondary to advanced malginancy extending to pulmonary, discussed in length with pt about advanced dirtective, comfort care, pt was hypoxic on on 3 L  n/c saturating for 84%, was placed on non breather, family wants to take her home with home 02, given pt is on venti mask, will need to be able to titrate off, will hold morphine and ativan, pt at home was taking medical marrijuana, with oxy, will place on the same combination, explained to daugther all pro and cons of it, hospice to have eval today at 5 pm

## 2017-11-10 NOTE — PROGRESS NOTE ADULT - SUBJECTIVE AND OBJECTIVE BOX
INTERVAL HX:  Dtr at bedside.   Continues to be weak, denies hunger, denies pain while rest.  Has intermittent RLQ pain. SaO2 has been low. Since yesterday, patient now DNR and DNI. Per patient order, family wishes to be around if patient gets worse and is dying. They are hopeful that patient may get better but realize that she also may not. We discussed usual survival for patients with metastatic colon cancer. All questions answered to daughter's satisfaction.    PHYSICAL EXAM  General: adult in NAD, chronically ill looking  HEENT: clear oropharynx, anicteric sclera, pink conjunctivae  Neck: supple  CV: normal S1S2 with no murmur rubs or gallops  Lungs: clear to auscultation, no wheezes, no rales  Abdomen: soft non-tender non-distended, no hepatosplenomegaly  Ext: no clubbing cyanosis or edema  Skin: no rashes and no petechiae  Neuro: alert and oriented X1 no focal deficits      LABS:  CBC Full  -  ( 10 Nov 2017 07:23 )  WBC Count : 27.6 K/uL  Hemoglobin : 8.2 g/dL  Hematocrit : 27.2 %  Platelet Count - Automated : 318 K/uL  Mean Cell Volume : 88.6 fl  Mean Cell Hemoglobin : 26.5 pg  Mean Cell Hemoglobin Concentration : 30.0 gm/dL  Auto Neutrophil # : x  Auto Lymphocyte # : x  Auto Monocyte # : x  Auto Eosinophil # : x  Auto Basophil # : x  Auto Neutrophil % : 90.0 %  Auto Lymphocyte % : 3.0 %  Auto Monocyte % : 6.0 %  Auto Eosinophil % : 1.0 %  Auto Basophil % : x      CBC Full  -  ( 09 Nov 2017 10:15 )  WBC Count : 21.1 K/uL  Hemoglobin : 8.0 g/dL  Hematocrit : 26.8 %  Platelet Count - Automated : 278 K/uL  Mean Cell Volume : 89.0 fl  Mean Cell Hemoglobin : 26.4 pg  Mean Cell Hemoglobin Concentration : 29.7 gm/dL  Auto Neutrophil # : 18.8 K/uL  Auto Lymphocyte # : 0.7 K/uL  Auto Monocyte # : 1.5 K/uL  Auto Eosinophil # : 0.1 K/uL  Auto Basophil # : 0.1 K/uL  Auto Neutrophil % : 89.0 %  Auto Lymphocyte % : 3.2 %  Auto Monocyte % : 7.2 %  Auto Eosinophil % : 0.3 %  Auto Basophil % : 0.4 %      11-09  137  |  100  |  11  ----------------------------<  79  3.3<L>   |  28  |  0.52  Ca    7.6<L>      09 Nov 2017 10:15  TPro  5.4<L>  /  Alb  1.2<L>  /  TBili  2.1<H>  /  DBili  x   /  AST  77<H>  /  ALT  35  /  AlkPhos  824<H>  11-09  PT/INR - ( 08 Nov 2017 15:59 )   PT: 24.5 sec;   INR: 2.21 ratio

## 2017-11-10 NOTE — PROGRESS NOTE ADULT - SUBJECTIVE AND OBJECTIVE BOX
[facility] - Zac Inman, Yobani, Jack, Lea, Alejandra Barksdale  Office Number:  [phone]    Very uncomfortable. On venti mask on exam. Decreased oxygen saturations noted.  Lethargic, most questions answered by son. Denies chest pain, palpitations, SOB. Reports not feeling hungry.     ROS: negative unless otherwise mentioned.    Telemetry:  ST at around 100 bpm, decreased oxygen saturation    MEDICATIONS  (STANDING):  enoxaparin Injectable 40 milliGRAM(s) SubCutaneous every 24 hours  sertraline 100 milliGRAM(s) Oral daily    MEDICATIONS  (PRN):  ALPRAZolam 0.5 milliGRAM(s) Oral two times a day PRN anxiety  morphine  - Injectable 1 milliGRAM(s) IV Push every 4 hours PRN Mild Pain (1 - 3)  morphine  - Injectable 2 milliGRAM(s) IV Push every 4 hours PRN Moderate Pain (4 - 6)  morphine  - Injectable 4 milliGRAM(s) IV Push every 4 hours PRN Severe Pain (7 - 10)  ondansetron   Disintegrating Tablet 4 milliGRAM(s) Oral every 6 hours PRN Nausea and/or Vomiting      Allergies    penicillin (Other)    Intolerances        Vital Signs Last 24 Hrs  T(C): 36.4 (10 Nov 2017 05:07), Max: 36.4 (10 Nov 2017 05:07)  T(F): 97.6 (10 Nov 2017 05:07), Max: 97.6 (10 Nov 2017 05:07)  HR: 103 (10 Nov 2017 05:07) (101 - 103)  BP: 120/73 (10 Nov 2017 05:07) (110/72 - 120/73)  BP(mean): --  RR: 17 (10 Nov 2017 05:07) (17 - 19)  SpO2: 90% (10 Nov 2017 05:07) (84% - 90%)    I&O's Summary    09 Nov 2017 07:01  -  10 Nov 2017 07:00  --------------------------------------------------------  IN: 740 mL / OUT: 200 mL / NET: 540 mL        ON EXAM:    Constitutional: elderly lethargic female, on 5L NC  HEENT:  MMM, sclerae anicteric, conjunctivae clear, no oral cyanosis.  Pulmonary: decreased breath sounds  Cardiovascular: Regular, S1 and S2.  No murmur.  No rubs, gallops or clicks  Gastrointestinal: Bowel Sounds present, soft, nontender.   Lymph: No peripheral edema.   Neurological: Alert, no focal deficits  Skin: No rashes.  Psych:  Mood & affect appropriate    LABS: All Labs Reviewed:                        8.2    27.6  )-----------( 318      ( 10 Nov 2017 07:23 )             27.2                         8.0    21.1  )-----------( 278      ( 09 Nov 2017 10:15 )             26.8                         9.3    23.3  )-----------( 289      ( 08 Nov 2017 15:59 )             30.7     10 Nov 2017 07:23    138    |  102    |  13     ----------------------------<  106    4.0     |  26     |  0.61   09 Nov 2017 10:15    137    |  100    |  11     ----------------------------<  79     3.3     |  28     |  0.52   08 Nov 2017 15:59    135    |  98     |  14     ----------------------------<  95     3.6     |  26     |  0.66     Ca    8.3        10 Nov 2017 07:23  Ca    7.6        09 Nov 2017 10:15  Ca    8.1        08 Nov 2017 15:59    TPro  5.7    /  Alb  1.3    /  TBili  2.4    /  DBili  x      /  AST  89     /  ALT  40     /  AlkPhos  901    10 Nov 2017 07:23  TPro  5.4    /  Alb  1.2    /  TBili  2.1    /  DBili  x      /  AST  77     /  ALT  35     /  AlkPhos  824    09 Nov 2017 10:15  TPro  6.1    /  Alb  1.4    /  TBili  2.4    /  DBili  x      /  AST  102    /  ALT  42     /  AlkPhos  969    08 Nov 2017 15:59    PT/INR - ( 08 Nov 2017 15:59 )   PT: 24.5 sec;   INR: 2.21 ratio           CARDIAC MARKERS ( 08 Nov 2017 15:59 )  <.015 ng/mL / x     / 326 U/L / x     / x          Blood Culture: Organism --  Gram Stain Blood -- Gram Stain --  Specimen Source .Blood Blood-Peripheral  Culture-Blood --      11-08 @ 15:59  Pro Bnp [id number]

## 2017-11-11 ENCOUNTER — TRANSCRIPTION ENCOUNTER (OUTPATIENT)
Age: 72
End: 2017-11-11

## 2017-11-11 PROCEDURE — 99239 HOSP IP/OBS DSCHRG MGMT >30: CPT

## 2017-11-11 RX ORDER — MORPHINE SULFATE 50 MG/1
0 CAPSULE, EXTENDED RELEASE ORAL
Qty: 0 | Refills: 0 | COMMUNITY
Start: 2017-11-11

## 2017-11-11 RX ORDER — SERTRALINE 25 MG/1
1 TABLET, FILM COATED ORAL
Qty: 0 | Refills: 0 | COMMUNITY

## 2017-11-11 RX ORDER — REGORAFENIB 40 MG/1
0 TABLET, FILM COATED ORAL
Qty: 0 | Refills: 0 | COMMUNITY

## 2017-11-11 RX ORDER — ATROPINE SULFATE 1 %
2 DROPS OPHTHALMIC (EYE)
Qty: 7 | Refills: 0 | OUTPATIENT
Start: 2017-11-11 | End: 2017-11-18

## 2017-11-11 RX ADMIN — MORPHINE SULFATE 2 MILLIGRAM(S): 50 CAPSULE, EXTENDED RELEASE ORAL at 02:03

## 2017-11-11 RX ADMIN — MORPHINE SULFATE 2 MILLIGRAM(S): 50 CAPSULE, EXTENDED RELEASE ORAL at 16:05

## 2017-11-11 RX ADMIN — MORPHINE SULFATE 2 MILLIGRAM(S): 50 CAPSULE, EXTENDED RELEASE ORAL at 06:17

## 2017-11-11 RX ADMIN — Medication 1 MILLIGRAM(S): at 16:37

## 2017-11-11 RX ADMIN — MORPHINE SULFATE 2 MILLIGRAM(S): 50 CAPSULE, EXTENDED RELEASE ORAL at 01:47

## 2017-11-11 RX ADMIN — MORPHINE SULFATE 2 MILLIGRAM(S): 50 CAPSULE, EXTENDED RELEASE ORAL at 09:55

## 2017-11-11 RX ADMIN — MORPHINE SULFATE 2 MILLIGRAM(S): 50 CAPSULE, EXTENDED RELEASE ORAL at 06:02

## 2017-11-11 RX ADMIN — MORPHINE SULFATE 2 MILLIGRAM(S): 50 CAPSULE, EXTENDED RELEASE ORAL at 15:34

## 2017-11-11 NOTE — DISCHARGE NOTE ADULT - PLAN OF CARE
comfort measures only -sec to metastatic cancer, colon cancer with mets to liver and lungs  -comfort measures -as above -as above  -acute respiratory failure with hypoxia requiring 100% nrb, now comfort measures dnr/dni, molst completed

## 2017-11-11 NOTE — PROGRESS NOTE ADULT - ASSESSMENT
Patient is a 73 yo female with pmhx of colon cancer s/p hemicolectomy, liver metastasis, HLD, HTN, anxiety, depression, osteoporosis, oophorectomy who presented with hypoxia, tachycardia, and failure to thrive, admit for progressive metastasis to lungs
71 yo female with pmhx of colon cancer s/p hemicolectomy, liver metastasis, HLD, HTN, anxiety, depression, osteoporosis, oophorectomy admitted [facility] P 11/8/2017 with SOB CTA neg for PE but showed progressive mets lung and liver         72 f colon cancer s/p hemicolectomy, liver mets, anxiety, benign ovarian tumor, and HLD, last seen here several weeks ago for nausea and vomiting.   EKG with nonspecific abnormalities at that time.  We followed her though that hospitalization.  Echo was performed revealed no significant abnormalities on a limited study.   She now presents having been experiencing dyspnea, hypoxia and hypotension with tachycardia.      -CTPA with no pulmonary embolism, but demonstrate progressive of metastatic disease.  - Pulse ox on remote tele 70-90%, and patient doesn't use O2 at home. Continue with oxygen supplementation  -Continue IV hydration, monitor HR and BP closely.   -Hypoxia and tachycardia in the setting of advanced malignancy, prognosis poor. Hospice and palliative evaluation.   -No evidence of acute ischemia  -No evidence of meaningful volume overload  -Normal LVEF as of last month on echo, no need to repeat at this time.   -Monitor electrolytes, keep K>4, Mg>2  -Will continue to follow closely, patient at risk for acute decompensation
72 f colon cancer s/p resection, liver mets, anxiety, benign ovarian tumor, and HLD, last seen here several weeks ago for nausea and vomiting.   EKG with nonspecific abnormalities at that time.  We followed her though that hospitalization.  Echo was performed revealed no significant abnormalities on a limited study.  She was eventually discharged to home.    She now presents having been experiencing dyspnea, hypoxia and hypotension with tachycardia.      -Pulse ox on remote tele 89-90%, per son this normal, and patient doesn't use O2 at home. Increased NC to 5L.  -Continue IV hydration, monitor HR and BP closely.   -Hypoxia and tachycardia in the setting of advanced malignancy, prognosis poor. Consider hospice evaluation.   -CTA chest negative   -No evidence of acute ischemia  -No evidence of meaningful volume overload  -Normal LVEF as of last month on echo, no need to repeat at this time.   -Monitor electrolytes, keep K>4, Mg>2  -Will continue to follow closely, patient at risk for acute decompensation
Metastatic colon cancer with continued progressive disease, clinical deteriation and worsening performance status. do not feel patient has adequate performance status for additional treatment and agree with continued palliative care    Yesterday, was discussion with  and daughter in regard to diagnosis and overall poor prognosis. she has desire to care for her at home and hope her performance status improves so she can continue treatment. she wants O2 at home and continued care with home care through [facility]. she is willing to talk to hospice but not necessarily willing to put her on.     Pt now DNR and DNI.     Today spoke with dtr some more. Discussed prognosis. Family wishes for  to be there in final hours.   Family is processing pt deterioration and coping with impending death from cancer, but hoping pt will get better.     Will continue current palliative care and pain management.
Metastatic colon cancer with continued progressive disease, clinical deteriation and worsening performance status. do not feel patient has adequate performance status for additional treatment and agree with continued palliative care    Yesterday, was discussion with  and daughter in regard to diagnosis and overall poor prognosis. she has desire to care for her at home and hope her performance status improves so she can continue treatment. she wants O2 at home and continued care with home care through [facility]. she is willing to talk to hospice but not necessarily willing to put her on.     Pt now DNR and DNI.     Today spoke with dtr some more. Discussed prognosis. Family wishes for  to be there in final hours.   Family is processing pt deterioration and coping with impending death from cancer, but hoping pt will get better.     Is now awaiting transfer to hospice inn but continues with rapid clinical deteriation   to continue current palliative care  have discussed with patient's family who prefer transfer to inn rather than continued pallilative care here
Patient is a 73 yo female with pmhx of colon cancer s/p hemicolectomy, liver metastasis, HLD, HTN, anxiety, depression, osteoporosis, oophorectomy who presented with hypoxia, tachycardia, and failure to thrive, admit for progressive metastasis to lungs
Patient is a 73 yo female with pmhx of colon cancer s/p hemicolectomy, liver metastasis, HLD, HTN, anxiety, depression, osteoporosis, oophorectomy who presented with hypoxia, tachycardia, and failure to thrive, admit for progressive metastasis to lungs, sepsis.

## 2017-11-11 NOTE — DISCHARGE NOTE ADULT - CARE PROVIDER_API CALL
Joe Tamez), Internal Medicine  [address] [place] [address]  Phone: [phone]  Fax: [phone]    Joe Cooper), Internal Medicine; Medical Oncology  [address] [place] [address]  Phone: [phone]  Fax: [phone] Joe Tamez), Internal Medicine  [address] [place] [address]  Phone: [phone]  Fax: [phone]    Dr. José Miguel Cooper Dr., MD    Oncologist in [place], [place]  Address: [address], [place] [id number]  Phone: [phone]  Phone: (   )    -  Fax: (   )    -

## 2017-11-11 NOTE — DISCHARGE NOTE ADULT - MEDICATION SUMMARY - MEDICATIONS TO TAKE
I will START or STAY ON the medications listed below when I get home from the hospital:    ibuprofen 400 mg oral tablet  -- 1 tab(s) by mouth every 6 hours, As needed, Mild to moderate pain  -- Indication: For Pain    morphine  -- 2mg IV push q2hrs prn dyspnea  -- Indication: For resp distress    LORazepam  -- 1mg IM q2hrs pern for agitation or anxiety  -- Indication: For Anxiety    atropine 1% ophthalmic solution  -- 2 dose(s) buccally every 2 hours, As Needed for secretions  -- Indication: For Secretions

## 2017-11-11 NOTE — DISCHARGE NOTE ADULT - CARE PLAN
Principal Discharge DX:	Failure to thrive in adult  Goal:	comfort measures only  Instructions for follow-up, activity and diet:	-sec to metastatic cancer, colon cancer with mets to liver and lungs  -comfort measures  Secondary Diagnosis:	Neoplasm /cancer  Goal:	comfort measures only  Instructions for follow-up, activity and diet:	-as above  Secondary Diagnosis:	Hypoxia  Goal:	comfort measures only  Instructions for follow-up, activity and diet:	-as above  -acute respiratory failure with hypoxia requiring 100% nrb, now comfort measures dnr/dni, molst completed

## 2017-11-11 NOTE — DISCHARGE NOTE ADULT - ADDITIONAL INSTRUCTIONS
-made dnr/dni as per family/hcp request due to advanced cancer and comfort measures only, hospice inn accepted

## 2017-11-11 NOTE — INPATIENT CERTIFICATION FOR MEDICARE PATIENTS - RISKS OF ADVERSE EVENTS
Concern for neurologic deterioration/Other:/Concern for delay in diagnosis and treatment/Concern for cardiopulmonary deterioration

## 2017-11-11 NOTE — DISCHARGE NOTE ADULT - CARE PROVIDERS DIRECT ADDRESSES
,DirectAddress_Unknown,[email or web address] ,DirectAddress_Unknown,DirectAddress_Unknown

## 2017-11-11 NOTE — PROGRESS NOTE ADULT - SUBJECTIVE AND OBJECTIVE BOX
Interval History: above noted. has clinically deteriated. on rebreather mask. family has agreed to hospice and awaiting transfer to hospice inn    Chart reviewed and events noted;   Overnight events:    MEDICATIONS  (STANDING):  enoxaparin Injectable 40 milliGRAM(s) SubCutaneous every 24 hours    MEDICATIONS  (PRN):  atropine 1% Ophthalmic Solution for SubLingual Use 2 Drop(s) SubLingual every 2 hours PRN secretions  LORazepam   Injectable 1 milliGRAM(s) IntraMuscular every 2 hours PRN anxiety and agitation  morphine  - Injectable 2 milliGRAM(s) IV Push every 2 hours PRN dyspnea  ondansetron Injectable 4 milliGRAM(s) IV Push every 4 hours PRN Nausea and/or Vomiting  oxyCODONE    IR 2.5 milliGRAM(s) Oral every 4 hours PRN Severe Pain (7 - 10)      Vital Signs Last 24 Hrs  T(C): --  T(F): --  HR: --  BP: --  BP(mean): --  RR: --  SpO2: --    PHYSICAL EXAM  General: unresponsive, Cheyne rajan resp  HEENT: clear oropharynx, anicteric sclera, pink conjunctivae  Neck: supple  CV: normal S1S2 with no murmur rubs or gallops  Lungs: clear to auscultation, no wheezes, no rhales  Abdomen: soft non-tender non-distended, no hepato/splenomegaly  Ext: no clubbing cyanosis or edema  Skin: no rashes and no petichiae  Neuro: unresponsive      LABS:  CBC Full  -  ( 10 Nov 2017 07:23 )  WBC Count : 27.6 K/uL  Hemoglobin : 8.2 g/dL  Hematocrit : 27.2 %  Platelet Count - Automated : 318 K/uL  Mean Cell Volume : 88.6 fl  Mean Cell Hemoglobin : 26.5 pg  Mean Cell Hemoglobin Concentration : 30.0 gm/dL  Auto Neutrophil # : x  Auto Lymphocyte # : x  Auto Monocyte # : x  Auto Eosinophil # : x  Auto Basophil # : x  Auto Neutrophil % : 90.0 %  Auto Lymphocyte % : 3.0 %  Auto Monocyte % : 6.0 %  Auto Eosinophil % : 1.0 %  Auto Basophil % : x    11-10    138  |  102  |  13  ----------------------------<  106<H>  4.0   |  26  |  0.61    Ca    8.3<L>      10 Nov 2017 07:23    TPro  5.7<L>  /  Alb  1.3<L>  /  TBili  2.4<H>  /  DBili  x   /  AST  89<H>  /  ALT  40  /  AlkPhos  901<H>  11-10        fe studies      WBC trend  27.6 K/uL (11-10-17 @ 07:23)  21.1 K/uL (11-09-17 @ 10:15)  23.3 K/uL (11-08-17 @ 15:59)      Hgb trend  8.2 g/dL (11-10-17 @ 07:23)  8.0 g/dL (11-09-17 @ 10:15)  9.3 g/dL (11-08-17 @ 15:59)      plt trend  318 K/uL (11-10-17 @ 07:23)  278 K/uL (11-09-17 @ 10:15)  289 K/uL (11-08-17 @ 15:59)        RADIOLOGY & ADDITIONAL STUDIES:

## 2017-11-11 NOTE — DISCHARGE NOTE ADULT - PROVIDER TOKENS
TOKEN:'[id number]',TOKEN:'[id number]' TOKEN:'[id number]',FREE:[LAST:[Kenneth],FIRST:[Dr. Valdez],PHONE:[(   )    -],FAX:[(   )    -],ADDRESS:[Dr. José Miguel Cooper MD    Oncologist in [place], [place]  [address], [place] [address]  Phone: [phone]]]

## 2017-11-11 NOTE — DISCHARGE NOTE ADULT - HOSPITAL COURSE
71 yo female with pmhx of colon cancer s/p hemicolectomy, liver metastasis, HLD, HTN, anxiety, depression, osteoporosis, oophorectomy who presented with hypoxia, tachycardia, and failure to thrive, admitted for progressive colon cancer metastasis to lungs and liver with acute respiratory failure with hypoxia desaturating to 84% requiring 100% nrb mask oxygenation. Pt continued to decline and family decided to make patient DNR/DNI given poor prognosis, pt's family and HCP also decided to make patient comfort measures only and with hospice evaluation patient was accepted to Hospice care [facility] for comfort measures only. MOLST completed by family and patient stable for discharge to comfort measures only at [facility]ospice care [facility].       PMD and oncologist to be informed  TIme spent: 65 minutes

## 2017-11-11 NOTE — PROGRESS NOTE ADULT - SUBJECTIVE AND OBJECTIVE BOX
Patient seen and examined today Plan discussed/Questions answered  (with patient/ancillary staff/colleagues) Chart notes reviewd More detailed Pulm/Critical Care notes, assessment and plan  will be added later today as needed after reviewing further labs as they become available     Notable interval events reviewed  Metastatic colon cancer with continued progressive disease, clinical deteriation and worsening performance status. do not feel patient has adequate performance status for additional treatment and agree with continued palliative care  Hospice being arranged     ROS/PE  . REVIEW OF SYSTEMS Patient is unable to give any reliable review of systems  PHYSICAL EXAM  On neb mask  HEENT Unremarkable PERRLA atraumatic  RESP Fair air entry EXP prolonged    Harsh breath sound Resp distres mild  CARDIAC S1 S2 No S3     NO JVD   ABDOMEN SOFT BS PRESENT NOT DISTENDED No hepatosplenomegaly  PEDAL EDEMA present No calf tenderness  NO rash GENERAL Not TOXIC looking  Awake A & O x 1  . Patient seen and examined today Plan discussed/Questions answered  (with patient/ancillary staff/colleagues) Chart notes reviewd More detailed Pulm/Critical Care notes, assessment and plan  will be added later today as needed after reviewing further labs as they become available     Notable interval events reviewed  Metastatic colon cancer with continued progressive disease, clinical deteriation and worsening performance status. do not feel patient has adequate performance status for additional treatment and agree with continued palliative care  Hospice being arranged     ROS/PE  . REVIEW OF SYSTEMS Patient is unable to give any reliable review of systems  PHYSICAL EXAM  On neb mask  HEENT Unremarkable PERRLA atraumatic  RESP Fair air entry EXP prolonged    Harsh breath sound Resp distres mild  CARDIAC S1 S2 No S3     NO JVD   ABDOMEN SOFT BS PRESENT NOT DISTENDED No hepatosplenomegaly  PEDAL EDEMA present No calf tenderness  NO rash GENERAL Not TOXIC looking  Awake A & O x 1  . VITALS/LABS  11/11/2017 afeb 103 120/70 17 90%   11/10/2017 W 27.6 Hb 8.2 Plt 318 Na 138 K 4 CO2 26 Cr .6   PROBLEM ASSESSMENT PLAN             11/11/2017 On nr mask   INFECTION PNEUMONIA  11/8-11/9 W 23.3-21.1  11/9 UA 1005 W 26-50L estrase Mod   11/8/2017 CTA Ch cw 10/16/2017Worsening hilomediast lne 1) R paratrach 3.3 x 2.4 (prev 2 x 1.4) 2) R hilar 1.6 x 3.3 (prev 1X 2.3) 3) No PE 4) Innumerable bl pulm mets worse RUL 1.6 cm lll 2.5 cm 40 extensive bl hepatic mets 2.3 x 3 cm   Leukocytosis may not represent actual infection and may be leukemoid reaction related to cancer and marrow involvement  LACTICEMIA  11/8-11/8 LA 2.7-2.4  possibly from large tumor burden anerobic metabolism  RO CHF  10/22/2017 ECHO n lvsf mild ph mild dd   NC ANEMIA   11/8-11/9-11/10 Hb 9.3-8-8.2   RO MI  11/8  11/8 Tr 1 n   ELEVATED LFTS   11/8-11/10/2017  (i)-901 (i)   (i)-89 (i)  ALT 42-40    PATIENT DESCRIPTION 11/8/2017 ADMISSION [facility] P                                   73 yo female with pmhx of colon cancer s/p hemicolectomy, liver metastasis, HLD, HTN, anxiety, depression, osteoporosis, oophorectomy admitted [facility] P 11/8/2017 with SOB CTA neg for PE but showed progressive mets lung and liver                                                                                  HOSPITAL COURSE PLAN  11/8/2017 ADMISSION [facility]         Patient seems to have advanced terminal disease PE has been ruled out and Hospice is being considered Would continue morphine oxygen Abio deferred as per ID Home O2 and other arrangements being made for possible DC home possibly on hospice CMO started      TIME SPENT Over 25 minutes aggregate  care time spent on encounter; activities included   direct patient care, counseling and/or coordinating care reviewing notes, lab data/ imaging , discussion with multidisciplinary team/ patient  /family. Risks, benefits, alternatives  discussed in detail. Proper antibiotic use issues addressed Questions/concerns  were addressed  as  best as possible

## 2017-11-11 NOTE — DISCHARGE NOTE ADULT - CONDITION (STATED IN TERMS THAT PERMIT A SPECIFIC MEASURABLE COMPARISON WITH CONDITION ON ADMISSION):
stable for discharge to hospice care inn for comfort measures only, poor prognosis, family aware and agrees with plan

## 2017-11-11 NOTE — DISCHARGE NOTE ADULT - PATIENT PORTAL LINK FT
“You can access the Follow[facility] Patient Portal, offered by [facility], by registering with the following website: [email or web address]”

## 2017-11-11 NOTE — DISCHARGE NOTE ADULT - MEDICATION SUMMARY - MEDICATIONS TO STOP TAKING
I will STOP taking the medications listed below when I get home from the hospital:    Zoloft 100 mg oral tablet  -- 1 tab(s) by mouth once a day    ALPRAZolam 0.5 mg oral tablet  -- 1 tab(s) by mouth 2 times a day, PRN for anxiety MDD:2  -- Avoid grapefruit and grapefruit juice while taking this medication.  Caution federal law prohibits the transfer of this drug to any person other  than the person for whom it was prescribed.  Do not take this drug if you are pregnant.  May cause drowsiness.  Alcohol may intensify this effect.  Use care when operating dangerous machinery.    Stivarga 40 mg oral tablet  -- orally 3 times a day    ibuprofen 400 mg oral tablet  -- 1 tab(s) by mouth every 6 hours, As needed, Mild to moderate pain    mirtazapine 7.5 mg oral tablet  -- 1 tab(s) by mouth once a day (at bedtime)    docusate sodium 100 mg oral capsule  -- 1 cap(s) by mouth 3 times a day, As needed, Constipation    polyethylene glycol 3350 oral powder for reconstitution  -- 17 gram(s) by mouth once a day, As needed, Constipation    senna oral tablet  -- 2 tab(s) by mouth once a day (at bedtime), As needed, Constipation    oxyCODONE 5 mg oral tablet  -- 1 tab(s) by mouth every 6 hours, As needed, Severe Pain (7 - 10)    ondansetron 4 mg oral tablet, disintegrating  -- 1 tab(s) by mouth every 6 hours, As needed, Nausea and/or Vomiting

## 2017-12-19 PROCEDURE — 99285 EMERGENCY DEPT VISIT HI MDM: CPT | Mod: 25

## 2017-12-19 PROCEDURE — 80076 HEPATIC FUNCTION PANEL: CPT

## 2017-12-19 PROCEDURE — 96367 TX/PROPH/DG ADDL SEQ IV INF: CPT

## 2017-12-19 PROCEDURE — 93005 ELECTROCARDIOGRAM TRACING: CPT

## 2017-12-19 PROCEDURE — 78306 BONE IMAGING WHOLE BODY: CPT

## 2017-12-19 PROCEDURE — 83880 ASSAY OF NATRIURETIC PEPTIDE: CPT

## 2017-12-19 PROCEDURE — 83605 ASSAY OF LACTIC ACID: CPT

## 2017-12-19 PROCEDURE — 82550 ASSAY OF CK (CPK): CPT

## 2017-12-19 PROCEDURE — 80053 COMPREHEN METABOLIC PANEL: CPT

## 2017-12-19 PROCEDURE — 87040 BLOOD CULTURE FOR BACTERIA: CPT

## 2017-12-19 PROCEDURE — 83735 ASSAY OF MAGNESIUM: CPT

## 2017-12-19 PROCEDURE — 84484 ASSAY OF TROPONIN QUANT: CPT

## 2017-12-19 PROCEDURE — 97162 PT EVAL MOD COMPLEX 30 MIN: CPT

## 2017-12-19 PROCEDURE — 85027 COMPLETE CBC AUTOMATED: CPT

## 2017-12-19 PROCEDURE — 81001 URINALYSIS AUTO W/SCOPE: CPT

## 2017-12-19 PROCEDURE — 94640 AIRWAY INHALATION TREATMENT: CPT

## 2017-12-19 PROCEDURE — 96365 THER/PROPH/DIAG IV INF INIT: CPT

## 2017-12-19 PROCEDURE — 97530 THERAPEUTIC ACTIVITIES: CPT

## 2017-12-19 PROCEDURE — 36415 COLL VENOUS BLD VENIPUNCTURE: CPT

## 2017-12-19 PROCEDURE — 74177 CT ABD & PELVIS W/CONTRAST: CPT

## 2017-12-19 PROCEDURE — 83690 ASSAY OF LIPASE: CPT

## 2017-12-19 PROCEDURE — 80048 BASIC METABOLIC PNL TOTAL CA: CPT

## 2017-12-19 PROCEDURE — 87086 URINE CULTURE/COLONY COUNT: CPT

## 2017-12-19 PROCEDURE — A9561: CPT

## 2017-12-19 PROCEDURE — 85610 PROTHROMBIN TIME: CPT

## 2017-12-19 PROCEDURE — 84145 PROCALCITONIN (PCT): CPT

## 2017-12-19 PROCEDURE — 82150 ASSAY OF AMYLASE: CPT

## 2017-12-19 PROCEDURE — 96366 THER/PROPH/DIAG IV INF ADDON: CPT

## 2017-12-19 PROCEDURE — 71045 X-RAY EXAM CHEST 1 VIEW: CPT

## 2017-12-19 PROCEDURE — 93306 TTE W/DOPPLER COMPLETE: CPT

## 2017-12-19 PROCEDURE — 78226 HEPATOBILIARY SYSTEM IMAGING: CPT

## 2017-12-19 PROCEDURE — 71275 CT ANGIOGRAPHY CHEST: CPT

## 2017-12-19 PROCEDURE — 97110 THERAPEUTIC EXERCISES: CPT

## 2017-12-19 PROCEDURE — 76705 ECHO EXAM OF ABDOMEN: CPT

## 2017-12-19 PROCEDURE — A9537: CPT

## 2017-12-19 PROCEDURE — 87186 SC STD MICRODIL/AGAR DIL: CPT

## 2018-09-05 NOTE — DISCHARGE NOTE ADULT - PROVIDER RX CONTACT NUMBER
Patient's mother advised. Appointment scheduled.    Future Appointments  Date Time Provider Jannet Holm   10/8/2018 9:30 AM Gauri Baldwin MD [facility] Jeanette Lee [phone]

## 2018-11-22 NOTE — PHYSICAL THERAPY INITIAL EVALUATION ADULT - BALANCE DISTURBANCE, IDENTIFIED IMPAIRMENT CONTRIBUTE, REHAB EVAL
Verified Results  HEPATITIS B SURFACE AG 03Oct2017 10:00AM LAKESHIA GANNON     Test Name Result Flag Reference   Hepatitis B Surface Ag NEGATIVE  NEGATIVE     HEPATITIS C AB 03Oct2017 10:00AM LAKESHIA GANNON     Test Name Result Flag Reference   HEPATITIS C ANTIBODY NEGATIVE  NEGATIVE     HIV ANTIGEN/ANTIBODY SCREEN 03Oct2017 10:00AM LAKESHIA GANNON     Test Name Result Flag Reference   HIV ANTIGEN/ANTIBODY SCREEN NONREACTIVE  NONREACTIVE       
Verified Results  T PALLIDUM IgG TPPA 03Oct2017 10:00AM LAKESHIA GANNON     Test Name Result Flag Reference   TREPONEMA PALLIDUM AB NONREACTIVE  NONREACTIVE   See Directory of Services for interpretation of syphilis testing algorithm.       
decreased strength/impaired postural control/pain/decreased ROM

## 2019-03-16 NOTE — PHYSICAL THERAPY INITIAL EVALUATION ADULT - NS ASR RISK AREAS PT EVAL
I concur with the Admission Order and I certify that services are provided in accordance with Section 42 CFR § 412.3 fall

## 2019-06-10 ENCOUNTER — TRANSCRIPTION ENCOUNTER (OUTPATIENT)
Age: 74
End: 2019-06-10

## 2020-04-20 NOTE — DIETITIAN INITIAL EVALUATION ADULT. - PROBLEM SELECTOR PLAN 1
Yes
- empiric coverage with vancomycin   - f/u blood culture   - f/u urine culture  - IVF NS 75/hr  - ID consult Dr. Caruso

## 2021-07-15 NOTE — ED PROVIDER NOTE - CPE EDP GASTRO NORM
Chief Complaint:  Abdominal Pain    History of Present Illness:  Sohail is a 19 year old female who presents with abdominal pain.    Reports abdominal pain that started 3 days ago.  Located in the middle of her abdomen, described as constant, like \"pressure\", and non-radiating. Seems worse with activity. No fevers.  No vomiting.  No back pain.  Reports feeling slightly nauseous today. Decreased appetite, but tolerating oral fluids well.  Last ate \"chips\" a few hours PTA. Last bowel movement this morning and described as \"small, brown balls.\" Reports typically having bowel movements once a week.  Normal urination.  No vaginal discharge or bleeding.  Denies sexual activity. Denies current pregnancy.  LMP 7/2.  No preceding injury, trauma, or new activity reported.  Reports taking aspirin at home for pain.    REVIEW OF SYSTEMS:  Constitutional:  No fever.  No sick contacts or recent travel.  Normal energy    HEENT:  No ear pain, nasal congestion, or sore throat   Respiratory: No cough or respiratory distress  Cardiac:  No chest pain.  No palpitations  GI:  See HPI  Skin:  No rash or skin lesions  : See HPI    Reviewed past medical history, family history, medications and allergies.    Past Medical History:  There is no previous medical history on file.    Family History:  No family history on file.    Medications:    No current outpatient medications on file prior to visit.  No current facility-administered medications on file prior to visit.      Allergies:  Allergies as of 07/14/2021   • (No Known Allergies)         Immunizations:    There is no immunization history on file for this patient.    PHYSICAL EXAM:  Visit Vitals  /60 (BP Location: RUE - Right upper extremity, Patient Position: Sitting, Cuff Size: Regular)   Pulse 95   Temp 98.6 °F (37 °C) (Oral)   Wt 45.1 kg   LMP 07/02/2021   SpO2 98%     Constitutional:  Well developed, no acute distress, non-toxic appearance.   Eyes:  PERRL. EOMI. Conjunctivae  without injection.  No discharge.  No edema.  No TTP.  No warmth.    HEENT:  External ears normal. Normal R ear canal. R Tympanic membrane intact with normal landmarks and no erythema. No perforation. Normal L ear canal. L Tympanic membrane intact with normal landmarks and no erythema. No perforation. Nose normal turbinates without congestion. Normal lips.  Patient refused oropharyngeal exam. Neck - no tenderness, supple, no cervical lymphadenopathy.   Respiratory:  No respiratory distress, normal breath sounds, good aeration, no wheezing, no rales or crackles  Cardiovascular:  Normal rate, normal rhythm, S1 + S2 present, no murmurs, cap refill < 2 seconds  Gastrointestinal:  Soft, nondistended, nontender, normal bowel sounds, no HSM, no rebound, no guarding.  No CVA tenderness b/l.  Integument:  Well hydrated, no rash or lesions    IMAGING:  XR ABDOMEN 1 VW KUB SUPINE    Result Date: 7/14/2021  EXAM: XR ABDOMEN 1 VW KUB SUPINE INDICATION:  Unspecified abdominal pain COMPARISON:  None.     FINDINGS/IMPRESSION:  There is a nonspecific, nonobstructive bowel gas pattern. Moderate colonic stool burden noted.     ASSESSMENT:   1. Abdominal pain, unspecified abdominal location    2. Constipation, unspecified constipation type    Well-appearing.  Non-toxic with benign abdomen on exam.  Reported symptoms and KUB imaging consistent with constipation.  Left voicemail message with x-ray results.  During visit provided with constipation handout, discussed home care, pushing fluids, increasing fiber in diet, and starting daily miralax.    PLAN:  Orders Placed This Encounter   • XR Abdomen 1 vw KUB Supine   • polyethylene glycol (MiraLax) 17 GM/SCOOP powder   -To push fluids every 1-2 hours  -To increase fiber in diet and eat 2-3 servings of fruits and vegetables daily  -To seek emergency department evaluation for worsening symptoms, abdominal pain, back pain, vomiting, fevers, concerns for dehydration, bloody stools, or  lethargy.  -Discussed with patient.  All questions answered       normal...

## 2021-12-15 NOTE — BEHAVIORAL HEALTH ASSESSMENT NOTE - ORIENTED TO PERSON
Detail Level: Detailed Quality 226: Preventive Care And Screening: Tobacco Use: Screening And Cessation Intervention: Patient screened for tobacco use and is an ex/non-smoker Quality 431: Preventive Care And Screening: Unhealthy Alcohol Use - Screening: Patient not identified as an unhealthy alcohol user when screened for unhealthy alcohol use using a systematic screening method Quality 110: Preventive Care And Screening: Influenza Immunization: Influenza Immunization Administered during Influenza season Yes

## 2022-05-17 NOTE — H&P ADULT - HISTORY OF PRESENT ILLNESS
70 y/o f with pmh of colon ca stage IV with mets to the liver was on chemo, not responding to therapy, last chemo was a month ago, depression (stopped taking anti-depressant for few months) states for the past week, was having generalaized weakness, bedconfined, not able to ambulate as felt dizzy and light headiness, pt went to PCP today for check up and was found to be hypotensive 80/60 and was sent to the ER, on further workup pt admits not having appetite and eating much for the past week, pt found to have left sided PNA with UTI on UA, pt denies any cp, palpitaiton, abdominal changes  spoke to PCP Joe abbott pt is only on zoloft 100 mg, has history of htn but was weaned of the meds 19-May-2022

## 2022-08-16 NOTE — CHART NOTE - NSCHARTNOTEFT_GEN_A_CORE
The pharmacy is requesting a refill of the below medication which has been pended for you:     Requested Prescriptions     Pending Prescriptions Disp Refills    doxepin (SINEQUAN) 50 MG capsule [Pharmacy Med Name: DOXEPIN HYDROCHLORIDE 50 MG Capsule] 90 capsule 1     Sig: TAKE 1 CAPSULE AT BEDTIME    omeprazole (PRILOSEC) 40 MG delayed release capsule [Pharmacy Med Name: OMEPRAZOLE 40 MG Capsule Delayed Release] 90 capsule 1     Sig: TAKE 1 CAPSULE EVERY DAY       Last Appointment Date: 7/6/2022  Next Appointment Date: 11/7/2022    Allergies   Allergen Reactions    Ceclor [Cefaclor]     Codeine     Pcn [Penicillins]     Percocet [Oxycodone-Acetaminophen]     Sulfa Antibiotics     Tegretol [Carbamazepine]     Vicodin [Hydrocodone-Acetaminophen] Upon Nutritional Assessment by the Registered Dietitian your patient was determined to meet criteria / has evidence of the following diagnosis/diagnoses:          [ ]  Mild Protein Calorie Malnutrition        [ ]  Moderate Protein Calorie Malnutrition        [ X] Severe Protein Calorie Malnutrition        [ ] Unspecified Protein Calorie Malnutrition        [ ] Underweight / BMI <19        [ ] Morbid Obesity / BMI > 40      Pt presents with severe protein calorie malnutrition in setting of chronic illness( Metastatic Colon Ca) as evidenced by:  *Consuming <75% estimated energy intake in greater than 1 month  *20lb (12.5%) weight loss over past 3 months       Treatment:    The following diet has been recommended:    *Dysphagia I puree diet w/ thin liquids  *Recommend ensure enlive 8oz po BID, ensure pudding 4oz po BID  *Food preferences/meal alternatives obtained through family  *Consider appetite stimulant; marinol     PROVIDER Section:     By signing this assessment you are acknowledging and agree with the diagnosis/diagnoses assigned by the Registered Dietitian    Comments:

## 2023-02-06 NOTE — PROGRESS NOTE ADULT - PROBLEM/PLAN-3
Emgality Appeal Approved     Prior Authorization Number: [id number]  Dates of approval: 2/3/23-3/2/23  Additional Information: It looks like insurance has over tuned the loading dose as of now.      Still waiting to hear about coverage on the maintenance dosing.   
DISPLAY PLAN FREE TEXT
normal

## 2023-03-15 NOTE — BEHAVIORAL HEALTH ASSESSMENT NOTE - NSBHPSYCHMEDSHX_PSY_A_CORE
Call [facility] nurse for any questions or concerns Mon-Fri 8am-4pm:                                                 #[phone]                                       For concerns after hours:                                               #[phone]          Plan from today:   On the Bumex, you are feeling a little better, but the lab results show us that we are getting you a little too dried out.   Decrease the Bumex to 2 mg (1 tab) in AM and 1 mg (1/2 tab) in the afternoon.   You should continue to weigh yourself daily.  If you notice 2-3 lbs or more of weight gain in a day, please call the [facility].   You are already scheduled to see Mirna on the 28th with blood work prior.       Lab results: see attached:   Component      Latest Ref Rng & Units 3/6/2023 3/15/2023   Sodium      136 - 145 mmol/L 142 141   Potassium      3.4 - 5.3 mmol/L 5.1    Chloride      94 - 109 mmol/L 107    Carbon Dioxide      20 - 32 mmol/L 39 (H)    Anion Gap      7 - 15 mmol/L <1 (L) 11   Urea Nitrogen      7 - 30 mg/dL 15    Creatinine      0.67 - 1.17 mg/dL 0.69 0.97   Calcium      8.8 - 10.2 mg/dL 9.4 9.5   Glucose      70 - 99 mg/dL 81    GFR Estimate      >60 mL/min/1.73m2 >90 78   Potassium      3.4 - 5.3 mmol/L  3.9   Chloride      98 - 107 mmol/L  94 (L)   Carbon Dioxide (CO2)      22 - 29 mmol/L  36 (H)   Urea Nitrogen      8.0 - 23.0 mg/dL  33.1 (H)   Glucose      70 - 99 mg/dL  198 (H)   N-Terminal Pro Bnp      0 - 1,800 pg/mL  265     
yes...

## 2023-06-30 NOTE — PATIENT PROFILE ADULT. - MENTAL HEALTH CONDITIONS/SYMPTOMS, PROFILE
FYI    Patient has new patient appt on 7/17 with Dr. Carnes.  I called him and informed him that Dr. Carnes cannot order any tests for him until after she sees him, evaluates him.  Advised him to call his previous physician to request the gallbladder exam.    anxiety disorder

## 2023-07-27 NOTE — PATIENT PROFILE ADULT. - COPING STRESSORS, PROFILE
[FreeTextEntry1] : Case discussed in clinical supervision [[X]] individual [[X]] group (Check one) ASSESSMENT METHOD (Check all that apply):  [[X]] Case presentation  [[X]] Review of Record/Data  [[ ]] Direct Observation  [[ ]] Audio Recording  [[ ]] Video Recording  FOCUS OF SUPERVISION (Check all that apply):  [[X]] Diagnosis & Assessment  [[X]] Intervention  [[ ]] Professional Conduct  [[X]] Culture and Diversity  [[ ]] Scholarly Inquiry    [[ ]] Consultation  [[ ]] Supervision  [[ ]] Administration/Documentation\par \par Supervisor: Narinder Josue, Ph.D.; Flo Dorado, Ph.D.  
none

## 2024-11-07 NOTE — ED PROVIDER NOTE - CROS ED CONS ALL NEG
Health Maintenance       DTaP/Tdap/Td Vaccine (7 - Td or Tdap)  Order placed this encounter    Influenza Vaccine (1)  Never done    COVID-19 Vaccine (3 - 2024-25 season)  Overdue since 9/1/2024           Following review of the above:  Patient is not proceeding with: COVID-19 and Influenza    Note: Refer to final orders and clinician documentation.       - - -

## 2025-02-22 NOTE — INPATIENT CERTIFICATION FOR MEDICARE PATIENTS - NS ICMP CERT SIG IND
Orthopaedic Patient Instructions:     Medications for pain:    Acetaminophen - Take one tablet every 6 hours for 1 week.  Then take every 6 hours as needed for pain.    Tramadol - Take one tablet every 6 hours for 1 week.  Then take every 6 hours as needed for pain.    Oxycodone 1 tablet every 4 hours as needed for pain.  You can take 2 every 6 hours for severe pain.    Medication for DVT prophylaxis (Prevention of blood clots)  Aspirin - Take 1 tablet daily for 6 weeks for prevention of blood clots    Medication for constipation:  Colace - Take 1 tablet every 12 hours as needed for constipation        Activity: activity as tolerated  Diet: regular diet  Wound Care: Patient should remove dressing in 9 days.  Patient can shower with the dressing on but should not bathe.  After removing, the dressing keep incision clean and dry    Follow-up with Dr. Holguin in 2 weeks.  Call for an appointment    I certify as stated above.